# Patient Record
Sex: FEMALE | URBAN - METROPOLITAN AREA
[De-identification: names, ages, dates, MRNs, and addresses within clinical notes are randomized per-mention and may not be internally consistent; named-entity substitution may affect disease eponyms.]

---

## 2022-08-26 ENCOUNTER — PATIENT OUTREACH (OUTPATIENT)
Dept: HEMATOLOGY ONCOLOGY | Facility: CLINIC | Age: 42
End: 2022-08-26

## 2022-08-26 NOTE — PROGRESS NOTES
Outside records requested from: [facility]  Pathology completed on: 08/11/2022  Fax:[phone]  Phone:[phone]  Imaging completed on:N/A  Fax:  Phone:

## 2022-08-30 ENCOUNTER — PATIENT OUTREACH (OUTPATIENT)
Dept: HEMATOLOGY ONCOLOGY | Facility: CLINIC | Age: 42
End: 2022-08-30

## 2022-08-30 NOTE — PROGRESS NOTES
Records received from outside facility  Outside Pathology/Images records received from : Jannie Haynes    Records sent to Pathology dept attention [id number]   Note routed to team  yes

## 2022-09-06 ENCOUNTER — DOCUMENTATION (OUTPATIENT)
Dept: HEMATOLOGY ONCOLOGY | Facility: CLINIC | Age: 42
End: 2022-09-06

## 2022-09-06 NOTE — PROGRESS NOTES
Intake received  Called pt to go over the services we offer got her voicemail left a message for pt to call me back  Pt called me back & I went over the services w/her & she sd that after her appt she will call me & let me know the plan   she thanked me for the call

## 2022-09-12 ENCOUNTER — TELEPHONE (OUTPATIENT)
Dept: GENETICS | Facility: CLINIC | Age: 42
End: 2022-09-12

## 2022-09-12 ENCOUNTER — CONSULT (OUTPATIENT)
Dept: HEMATOLOGY ONCOLOGY | Facility: CLINIC | Age: 42
End: 2022-09-12
Payer: COMMERCIAL

## 2022-09-12 VITALS
OXYGEN SATURATION: 99 % | DIASTOLIC BLOOD PRESSURE: 90 MMHG | TEMPERATURE: 97.6 F | SYSTOLIC BLOOD PRESSURE: 130 MMHG | WEIGHT: 163 LBS | BODY MASS INDEX: 25.58 KG/M2 | HEART RATE: 85 BPM | RESPIRATION RATE: 16 BRPM | HEIGHT: 67 IN

## 2022-09-12 DIAGNOSIS — C43.59 MELANOMA OF BACK (HCC): Primary | ICD-10-CM

## 2022-09-12 DIAGNOSIS — Z80.3 FAMILY HISTORY OF BREAST CANCER: ICD-10-CM

## 2022-09-12 PROCEDURE — 99244 OFF/OP CNSLTJ NEW/EST MOD 40: CPT | Performed by: INTERNAL MEDICINE

## 2022-09-12 RX ORDER — MELATONIN
1000 DAILY
COMMUNITY

## 2022-09-12 RX ORDER — GABAPENTIN 300 MG/1
300 CAPSULE ORAL
COMMUNITY

## 2022-09-12 RX ORDER — CYCLOBENZAPRINE HCL 5 MG
5 TABLET ORAL 3 TIMES DAILY PRN
COMMUNITY

## 2022-09-12 RX ORDER — FERROUS SULFATE 325(65) MG
325 TABLET ORAL
COMMUNITY

## 2022-09-12 RX ORDER — ALBUTEROL SULFATE 90 UG/1
2 AEROSOL, METERED RESPIRATORY (INHALATION) EVERY 6 HOURS PRN
COMMUNITY

## 2022-09-12 RX ORDER — PANTOPRAZOLE SODIUM 40 MG/1
40 TABLET, DELAYED RELEASE ORAL DAILY
COMMUNITY

## 2022-09-12 RX ORDER — BUDESONIDE AND FORMOTEROL FUMARATE DIHYDRATE 80; 4.5 UG/1; UG/1
2 AEROSOL RESPIRATORY (INHALATION) 2 TIMES DAILY
COMMUNITY

## 2022-09-12 RX ORDER — ONDANSETRON 4 MG/1
4 TABLET, FILM COATED ORAL EVERY 8 HOURS PRN
COMMUNITY

## 2022-09-12 NOTE — TELEPHONE ENCOUNTER
I called Sharlene Block to schedule a new patient appointment with the [facility]       Outcome:   Spoke with patient, genetics appt schedule for 9/20 at 2pm  Pt recently dx with melanoma, fhx of breast ca, colon ca, melanoma, bone ca  Non-Buddhist, no prior genetic testing   Genetic test results needed to determine if they remove ovaries

## 2022-09-12 NOTE — PROGRESS NOTES
[facility] [address] [id number] [phone]     Date of Visit: 9/12/2022  Name: Shanti Philip   YOB: 1980        Subjective    VISIT DIAGNOSIS:  Diagnoses and all orders for this visit:    Melanoma of back ([facility] )  -     LD,Blood; Future  -     Ambulatory Referral to Surgical Oncology; Future  -     Ambulatory Referral to Oncology Genetics; Future    Family history of breast cancer    Other orders  -     ferrous sulfate 325 (65 Fe) mg tablet; Take 325 mg by mouth daily with breakfast  -     ondansetron (ZOFRAN) 4 mg tablet; Take 4 mg by mouth every 8 (eight) hours as needed for nausea or vomiting  -     cholecalciferol (VITAMIN D3) 1,000 units tablet; Take 1,000 Units by mouth daily  -     pantoprazole (PROTONIX) 40 mg tablet; Take 40 mg by mouth daily  -     cyclobenzaprine (FLEXERIL) 5 mg tablet; Take 5 mg by mouth 3 (three) times a day as needed for muscle spasms  -     budesonide-formoterol (Symbicort) 80-4 5 MCG/ACT inhaler; Inhale 2 puffs 2 (two) times a day Rinse mouth after use  -     albuterol (PROVENTIL HFA,VENTOLIN HFA) 90 mcg/act inhaler; Inhale 2 puffs every 6 (six) hours as needed for wheezing  -     gabapentin (NEURONTIN) 300 mg capsule; Take 300 mg by mouth 3 (three) times a day        Oncology History   Melanoma of back ([facility] )   8/11/2022 -  Cancer Staged    Staging form: Melanoma of the Skin, AJCC 8th Edition  - Clinical stage from 8/11/2022: Stage IA (cT1a, cN0, cM0) - Signed by Abena Chan MD on 9/12/2022 9/12/2022 Initial Diagnosis    Melanoma of back [facility])        Cancer Staging  Melanoma of back [facility])  Staging form: Melanoma of the Skin, AJCC 8th Edition  - Clinical stage from 8/11/2022: Stage IA (cT1a, cN0, cM0) - Signed by Abena Chan MD on 9/12/2022     HISTORY OF PRESENT ILLNESS: Shanti Philip is a 46 y o  female  who is seen in consultation for new diagnosis of melanoma    She is accompanied by her  in the history is obtained from the patient, her , and review of records  He states that she has had a mole on her back for some time, but that over the past year it has changed  He describes that the mole has always been darker than her other moles, however started changing  She states that it started growing out words and getting bigger  She does describe the lesion is being flat but darker, which this had changed over time  She denies any itching, bleeding, pain of the lesion  She saw Dermatology who biopsy the lesion on 08/11/2022  Pathology demonstrates malignant melanoma, primary cutaneous, 0 3 mm Breslow thickness in the right superior medial upper back; [place] level II, no mitoses, and no ulceration  She is certainly anxious about this new diagnosis  Denies any new, changing, concerning skin lesions at this time  Denies any lymphadenopathy  Anxious for further steps  She describes having normal sun exposure when she was growing up  She does describe having some increased number of sunburns  She describes having 1-2 blistering sunburns  She describes increase tanning bed use  She states she used it a lot during her teenage years  She describes using tanning beds 3 times a week for 2-3 years  She states there is extensive melanoma family history on her mother's side  She states that her paternal aunt had breast cancer diagnosed in her 45s  She denies a family history of ovarian or pancreatic cancer  She states her MGM had colon cancer in her 62s  Her maternal uncle had bone cancer in his late 62s  Her maternal grandfather had lung cancer in his late 76s and he was a nonsmoker  She has 2 children ages 16 and 15  SHe has 2 brothers and 2 sisters  She had red hair when she was younger, has blue/green eyes, and is [place] descent  She is up-to-date on colonoscopies, mammograms which demonstrates cysts, and Pap smears      She denies smoking  She describes having occasional alcohol use  And she uses medical marijuana for pain management  REVIEW OF SYSTEMS:  Review of Systems   Constitutional: Negative for appetite change, fatigue, fever and unexpected weight change  HENT:   Negative for lump/mass  Eyes: Negative for icterus  Respiratory: Negative for cough, shortness of breath and wheezing  Cardiovascular: Negative for leg swelling  Gastrointestinal: Negative for abdominal pain, constipation, diarrhea, nausea and vomiting  Genitourinary: Negative for difficulty urinating and hematuria  Musculoskeletal: Negative for arthralgias, gait problem and myalgias  Skin: Negative for itching and rash  No new, changing, or concerning lesions  Neurological: Negative for extremity weakness, gait problem, headaches, light-headedness and numbness  Hematological: Negative for adenopathy          MEDICATIONS:    Current Outpatient Medications:     albuterol (PROVENTIL HFA,VENTOLIN HFA) 90 mcg/act inhaler, Inhale 2 puffs every 6 (six) hours as needed for wheezing, Disp: , Rfl:     budesonide-formoterol (Symbicort) 80-4 5 MCG/ACT inhaler, Inhale 2 puffs 2 (two) times a day Rinse mouth after use , Disp: , Rfl:     cholecalciferol (VITAMIN D3) 1,000 units tablet, Take 1,000 Units by mouth daily, Disp: , Rfl:     cyclobenzaprine (FLEXERIL) 5 mg tablet, Take 5 mg by mouth 3 (three) times a day as needed for muscle spasms, Disp: , Rfl:     ferrous sulfate 325 (65 Fe) mg tablet, Take 325 mg by mouth daily with breakfast, Disp: , Rfl:     gabapentin (NEURONTIN) 300 mg capsule, Take 300 mg by mouth 3 (three) times a day, Disp: , Rfl:     ondansetron (ZOFRAN) 4 mg tablet, Take 4 mg by mouth every 8 (eight) hours as needed for nausea or vomiting, Disp: , Rfl:     pantoprazole (PROTONIX) 40 mg tablet, Take 40 mg by mouth daily, Disp: , Rfl:      ALLERGIES:  Allergies   Allergen Reactions    Zithromax [Azithromycin] Nausea Only ACTIVE PROBLEMS:  Patient Active Problem List   Diagnosis    Melanoma of back [facility])          PAST MEDICAL HISTORY:   History reviewed  No pertinent past medical history  PAST SURGICAL HISTORY:  History reviewed  No pertinent surgical history  SOCIAL HISTORY:  Social History     Socioeconomic History    Marital status: /Civil Union     Spouse name: None    Number of children: None    Years of education: None    Highest education level: None   Occupational History    None   Tobacco Use    Smoking status: Never Smoker    Smokeless tobacco: Never Used   Substance and Sexual Activity    Alcohol use: Yes     Alcohol/week: 2 0 standard drinks     Types: 2 Glasses of wine per week    Drug use: Yes     Types: Marijuana    Sexual activity: Not Currently   Other Topics Concern    None   Social History Narrative    None     Social Determinants of Health     Financial Resource Strain: Not on file   Food Insecurity: Not on file   Transportation Needs: Not on file   Physical Activity: Not on file   Stress: Not on file   Social Connections: Not on file   Intimate Partner Violence: Not on file   Housing Stability: Not on file        FAMILY HISTORY:  She states there is extensive melanoma family history on her mother's side  She states that her paternal aunt had breast cancer diagnosed in her 45s  She denies a family history of ovarian or pancreatic cancer  She states her MGM had colon cancer in her 62s  Her maternal uncle had bone cancer in his late 62s  Her maternal grandfather had lung cancer in his late 76s and he was a nonsmoke       Objective    PHYSICAL EXAMINATION:   Blood pressure 130/90, pulse 85, temperature 97 6 °F (36 4 °C), temperature source Tympanic, resp  rate 16, height 5' 7" (1 702 m), weight 73 9 kg (163 lb), SpO2 99 %       Pain Score: 0-No pain     ECOG Performance Status    Flowsheet Row Most Recent Value   ECOG Performance Status 0 - Fully active, able to carry on all pre-disease performance without restriction             Physical Exam  Constitutional:       General: She is not in acute distress  Appearance: Normal appearance  She is not toxic-appearing  HENT:      Mouth/Throat:      Mouth: Mucous membranes are moist       Pharynx: Oropharynx is clear  Eyes:      General: No scleral icterus  Cardiovascular:      Rate and Rhythm: Normal rate and regular rhythm  Pulses: Normal pulses  Heart sounds: No murmur heard  No friction rub  No gallop  Pulmonary:      Effort: Pulmonary effort is normal  No respiratory distress  Breath sounds: Normal breath sounds  No wheezing or rales  Chest:   Breasts:      Right: No axillary adenopathy or supraclavicular adenopathy  Left: No axillary adenopathy or supraclavicular adenopathy  Abdominal:      General: There is no distension  Palpations: There is no mass  Tenderness: There is no abdominal tenderness  There is no rebound  Musculoskeletal:         General: No swelling or tenderness  Right lower leg: No edema  Left lower leg: No edema  Lymphadenopathy:      Head:      Right side of head: No submandibular, preauricular or posterior auricular adenopathy  Left side of head: No submandibular, preauricular or posterior auricular adenopathy  Cervical: No cervical adenopathy  Right cervical: No superficial or posterior cervical adenopathy  Left cervical: No superficial or posterior cervical adenopathy  Upper Body:      Right upper body: No supraclavicular or axillary adenopathy  Left upper body: No supraclavicular or axillary adenopathy  Lower Body: No right inguinal adenopathy  No left inguinal adenopathy  Skin:     Findings: No rash  Comments: Healing biopsy site  No evidence of recurrence at primary site  Some DNs, and one small dark lesion on lower mid back  towards left side    Not concerning skin lesions    Neurological:      General: No focal deficit present  Mental Status: She is alert and oriented to person, place, and time  Psychiatric:         Mood and Affect: Mood normal          Behavior: Behavior normal          Thought Content: Thought content normal          Judgment: Judgment normal          I reviewed lab data in the chart  No results found for: WBC, HGB, PLT, MCV   No results found for: NA, K, CL, CO2, BUN, CREATININE, GLUC, EGFRNAA, EGFRAA, CALCIUM, PROT, ALB, TBILI, ALKPHOS, AST, ALT   No results found for: LDH  No results found for: TSH  No results found for: N3RDDBR   No results found for: FREET4      RECENT IMAGING:  No results found  Assessment/Plan  Ms Palmira Barraza is a 46 yr female with newly diagnosed melanoma here for disease management discussion  1  Melanoma of back ([facility] )  I had an extensive discussion with the patient and her  regarding her  diagnosis, prognosis, and recommendations for further management  We reviewed her melanoma history, current findings, pathology and imaging tests  I discussed that currently he has a thin melanoma, 0 3 mm Breslow thickness, and that there is limited potential for recurrence  I did discuss that we recommend a wide local excision and have referred her to our Surgical Oncology to perform this procedure  We discussed that final recommendations will be made after surgery and we have final pathology  We discussed that she and will require ongoing monitoring and surveillance, both for disease recurrence as well as a new primary melanoma as well as other nonmelanoma skin cancers  This includes physical exams and labs, including a comprehensive metabolic panel, CBC with differential and LDH  He does have baseline CBC with differential and comprehensive metabolic panel skin into media  I did discuss we would like an LDH and place the order and provided her with scripts for this blood work      She will return for follow-up 2 weeks after surgery to discuss pathology and final follow-up and surveillance recommendations  We discussed the importance of regular cutaneous self examinations and reviewed the features of lesions that could be concerning for skin cancer  I did explain that she  is at risk for developing another primary melanoma as well  We also discussed avoidance of unnecessary sun exposure and use of sun protective clothing and sunscreen  I also recommended routine follow up and skin checks with dermatology  Family Screening: her  first-degree relatives, namely his siblings, parents, and children, have an increased risk of developing a melanoma over the general population given her  diagnosis of melanoma, and should have annual dermatologic screening         - LD,Blood; Future  - Ambulatory Referral to Surgical Oncology; Future  - Ambulatory Referral to Oncology Genetics; Future    2  Family history of breast cancer  She has a family history of breast cancer in her family - in paternal aunt  Given this history and her melanoma, it is possible that there could be a genetic underpinning to her  disease and recommend that she  speak with a genetic counselor regarding germline genetic testing  I have placed referral for the genetic counselor  He is due for hysterectomy in March 2023 due to abnormal uterine bleeding, with plans for retention of her ovaries     I discussed that I will try to expedite this appointment quickly, as if he needs testing, we would like it done before he undergoes a hysterectomy as we are maybe a question regarding removal of her ovaries at this time  - Ambulatory Referral to Oncology Genetics; Future      Ms Roper Oh had all her questions and concerns addressed and she knows to call with any additional issues  Thank you for allowing me to participate in Ms Monte's care        Mahi Pino MD, PhD

## 2022-09-12 NOTE — LETTER
September 12, 2022     [facility] [id number]    Patient: Anders Tovar   YOB: 1980   Date of Visit: 9/12/2022       Dear Dr Efrain Estes:    Thank you for referring Anders Tovar to me for evaluation  Below are my notes for this consultation  If you have questions, please do not hesitate to call me  I look forward to following your patient along with you  Sincerely,        Mars Finney MD        CC: Sydney Osler, MD  9/12/2022  7:40 PM  Sign when Signing Visit  [address] [place] [address] [facility]  [phone]  [phone]     Date of Visit: 9/12/2022  Name: Anders Tovar   YOB: 1980        Subjective    VISIT DIAGNOSIS:  Diagnoses and all orders for this visit:    [facility] )  -     LD,Blood; Future  -     Ambulatory Referral to Surgical Oncology; Future  -     Ambulatory Referral to Oncology Genetics; Future    Family history of breast cancer    Other orders  -     ferrous sulfate 325 (65 Fe) mg tablet; Take 325 mg by mouth daily with breakfast  -     ondansetron (ZOFRAN) 4 mg tablet; Take 4 mg by mouth every 8 (eight) hours as needed for nausea or vomiting  -     cholecalciferol (VITAMIN D3) 1,000 units tablet; Take 1,000 Units by mouth daily  -     pantoprazole (PROTONIX) 40 mg tablet; Take 40 mg by mouth daily  -     cyclobenzaprine (FLEXERIL) 5 mg tablet; Take 5 mg by mouth 3 (three) times a day as needed for muscle spasms  -     budesonide-formoterol (Symbicort) 80-4 5 MCG/ACT inhaler; Inhale 2 puffs 2 (two) times a day Rinse mouth after use  -     albuterol (PROVENTIL HFA,VENTOLIN HFA) 90 mcg/act inhaler; Inhale 2 puffs every 6 (six) hours as needed for wheezing  -     gabapentin (NEURONTIN) 300 mg capsule;  Take 300 mg by mouth 3 (three) times a day        Oncology History   Melanoma [facility] back ([facility] )   8/11/2022 -  Cancer Staged    Staging form: Melanoma of the Skin, AJCC 8th Edition  - Clinical stage from 8/11/2022: Stage IA (cT1a, cN0, cM0) - Signed by Mars Finney MD on 9/12/2022 9/12/2022 Initial Diagnosis    Melanoma of back [facility])        Cancer Staging  Melanoma of back [facility])  Staging form: Melanoma of the Skin, AJCC 8th Edition  - Clinical stage from 8/11/2022: Stage IA (cT1a, cN0, cM0) - Signed by Mars Finney MD on 9/12/2022     HISTORY OF PRESENT ILLNESS: Anders Tovar is a 46 y o  female  who is seen in consultation for new diagnosis of melanoma  She is accompanied by her  in the history is obtained from the patient, her , and review of records  He states that she has had a mole on her back for some time, but that over the past year it has changed  He describes that the mole has always been darker than her other moles, however started changing  She states that it started growing out words and getting bigger  She does describe the lesion is being flat but darker, which this had changed over time  She denies any itching, bleeding, pain of the lesion  She saw Dermatology who biopsy the lesion on 08/11/2022  Pathology demonstrates malignant melanoma, primary cutaneous, 0 3 mm Breslow thickness in the right superior medial upper back; Cheko level II, no mitoses, and no ulceration  She is certainly anxious about this new diagnosis  Denies any new, changing, concerning skin lesions at this time  Denies any lymphadenopathy  Anxious for further steps  She describes having normal sun exposure when she was growing up  She does describe having some increased number of sunburns  She describes having 1-2 blistering sunburns  She describes increase tanning bed use  She states she used it a lot during her teenage years  She describes using tanning beds 3 times a week for 2-3 years  She states there is extensive melanoma family history on her mother's side    She states that her paternal aunt had breast cancer diagnosed in her 45s  She denies a family history of ovarian or pancreatic cancer  She states her MGM had colon cancer in her 62s  Her maternal uncle had bone cancer in his late 62s  Her maternal grandfather had lung cancer in his late 76s and he was a nonsmoker  She has 2 children ages 16 and 15  SHe has 2 brothers and 2 sisters  She had red hair when she was younger, has blue/green eyes, and is [place] descent  She is up-to-date on colonoscopies, mammograms which demonstrates cysts, and Pap smears  She denies smoking  She describes having occasional alcohol use  And she uses medical marijuana for pain management  REVIEW OF SYSTEMS:  Review of Systems   Constitutional: Negative for appetite change, fatigue, fever and unexpected weight change  HENT:   Negative for lump/mass  Eyes: Negative for icterus  Respiratory: Negative for cough, shortness of breath and wheezing  Cardiovascular: Negative for leg swelling  Gastrointestinal: Negative for abdominal pain, constipation, diarrhea, nausea and vomiting  Genitourinary: Negative for difficulty urinating and hematuria  Musculoskeletal: Negative for arthralgias, gait problem and myalgias  Skin: Negative for itching and rash  No new, changing, or concerning lesions  Neurological: Negative for extremity weakness, gait problem, headaches, light-headedness and numbness  Hematological: Negative for adenopathy          MEDICATIONS:    Current Outpatient Medications:     albuterol (PROVENTIL HFA,VENTOLIN HFA) 90 mcg/act inhaler, Inhale 2 puffs every 6 (six) hours as needed for wheezing, Disp: , Rfl:     budesonide-formoterol (Symbicort) 80-4 5 MCG/ACT inhaler, Inhale 2 puffs 2 (two) times a day Rinse mouth after use , Disp: , Rfl:     cholecalciferol (VITAMIN D3) 1,000 units tablet, Take 1,000 Units by mouth daily, Disp: , Rfl:     cyclobenzaprine (FLEXERIL) 5 mg tablet, Take 5 mg by mouth 3 (three) times a day as needed for muscle spasms, Disp: , Rfl:     ferrous sulfate 325 (65 Fe) mg tablet, Take 325 mg by mouth daily with breakfast, Disp: , Rfl:     gabapentin (NEURONTIN) 300 mg capsule, Take 300 mg by mouth 3 (three) times a day, Disp: , Rfl:     ondansetron (ZOFRAN) 4 mg tablet, Take 4 mg by mouth every 8 (eight) hours as needed for nausea or vomiting, Disp: , Rfl:     pantoprazole (PROTONIX) 40 mg tablet, Take 40 mg by mouth daily, Disp: , Rfl:      ALLERGIES:  Allergies   Allergen Reactions    Zithromax [Azithromycin] Nausea Only        ACTIVE PROBLEMS:  Patient Active Problem List   Diagnosis    Melanoma [facility] back ([facility] )          PAST MEDICAL HISTORY:   History reviewed  No pertinent past medical history  PAST SURGICAL HISTORY:  History reviewed  No pertinent surgical history  SOCIAL HISTORY:  Social History     Socioeconomic History    Marital status: /Civil Union     Spouse name: None    Number of children: None    Years of education: None    Highest education level: None   Occupational History    None   Tobacco Use    Smoking status: Never Smoker    Smokeless tobacco: Never Used   Substance and Sexual Activity    Alcohol use: Yes     Alcohol/week: 2 0 standard drinks     Types: 2 Glasses of wine per week    Drug use: Yes     Types: Marijuana    Sexual activity: Not Currently   Other Topics Concern    None   Social History Narrative    None     Social Determinants of Health     Financial Resource Strain: Not on file   Food Insecurity: Not on file   Transportation Needs: Not on file   Physical Activity: Not on file   Stress: Not on file   Social Connections: Not on file   Intimate Partner Violence: Not on file   Housing Stability: Not on file        FAMILY HISTORY:  She states there is extensive melanoma family history on her mother's side  She states that her paternal aunt had breast cancer diagnosed in her 45s    She denies a family history of ovarian or pancreatic cancer  She states her MGM had colon cancer in her 62s  Her maternal uncle had bone cancer in his late 62s  Her maternal grandfather had lung cancer in his late 76s and he was a nonsmoke       Objective    PHYSICAL EXAMINATION:   Blood pressure 130/90, pulse 85, temperature 97 6 °F (36 4 °C), temperature source Tympanic, resp  rate 16, height 5' 7" (1 702 m), weight 73 9 kg (163 lb), SpO2 99 %  Pain Score: 0-No pain     ECOG Performance Status    Flowsheet Row Most Recent Value   ECOG Performance Status 0 - Fully active, able to carry on all pre-disease performance without restriction             Physical Exam  Constitutional:       General: She is not in acute distress  Appearance: Normal appearance  She is not toxic-appearing  HENT:      Mouth/Throat:      Mouth: Mucous membranes are moist       Pharynx: Oropharynx is clear  Eyes:      General: No scleral icterus  Cardiovascular:      Rate and Rhythm: Normal rate and regular rhythm  Pulses: Normal pulses  Heart sounds: No murmur heard  No friction rub  No gallop  Pulmonary:      Effort: Pulmonary effort is normal  No respiratory distress  Breath sounds: Normal breath sounds  No wheezing or rales  Chest:   Breasts:      Right: No axillary adenopathy or supraclavicular adenopathy  Left: No axillary adenopathy or supraclavicular adenopathy  Abdominal:      General: There is no distension  Palpations: There is no mass  Tenderness: There is no abdominal tenderness  There is no rebound  Musculoskeletal:         General: No swelling or tenderness  Right lower leg: No edema  Left lower leg: No edema  Lymphadenopathy:      Head:      Right side of head: No submandibular, preauricular or posterior auricular adenopathy  Left side of head: No submandibular, preauricular or posterior auricular adenopathy  Cervical: No cervical adenopathy        Right cervical: No superficial or posterior cervical adenopathy  Left cervical: No superficial or posterior cervical adenopathy  Upper Body:      Right upper body: No supraclavicular or axillary adenopathy  Left upper body: No supraclavicular or axillary adenopathy  Lower Body: No right inguinal adenopathy  No left inguinal adenopathy  Skin:     Findings: No rash  Comments: Healing biopsy site  No evidence of recurrence at primary site  Some DNs, and one small dark lesion on lower mid back  towards left side  Not concerning skin lesions    Neurological:      General: No focal deficit present  Mental Status: She is alert and oriented to person, place, and time  Psychiatric:         Mood and Affect: Mood normal          Behavior: Behavior normal          Thought Content: Thought content normal          Judgment: Judgment normal          I reviewed lab data in the chart  No results found for: WBC, HGB, PLT, MCV   No results found for: NA, K, CL, CO2, BUN, CREATININE, GLUC, EGFRNAA, EGFRAA, CALCIUM, PROT, ALB, TBILI, ALKPHOS, AST, ALT   No results found for: LDH  No results found for: TSH  No results found for: U4NOKVI   No results found for: FREET4      RECENT IMAGING:  No results found  Assessment/Plan  Ms Eunice Reddy is a 46 yr female with newly diagnosed melanoma here for disease management discussion  1  Melanoma of back ([facility] )  I had an extensive discussion with the patient and her  regarding her  diagnosis, prognosis, and recommendations for further management  We reviewed her melanoma history, current findings, pathology and imaging tests  I discussed that currently he has a thin melanoma, 0 3 mm Breslow thickness, and that there is limited potential for recurrence  I did discuss that we recommend a wide local excision and have referred her to our Surgical Oncology to perform this procedure  We discussed that final recommendations will be made after surgery and we have final pathology    We discussed that she and will require ongoing monitoring and surveillance, both for disease recurrence as well as a new primary melanoma as well as other nonmelanoma skin cancers  This includes physical exams and labs, including a comprehensive metabolic panel, CBC with differential and LDH  He does have baseline CBC with differential and comprehensive metabolic panel skin into media  I did discuss we would like an LDH and place the order and provided her with scripts for this blood work  She will return for follow-up 2 weeks after surgery to discuss pathology and final follow-up and surveillance recommendations  We discussed the importance of regular cutaneous self examinations and reviewed the features of lesions that could be concerning for skin cancer  I did explain that she  is at risk for developing another primary melanoma as well  We also discussed avoidance of unnecessary sun exposure and use of sun protective clothing and sunscreen  I also recommended routine follow up and skin checks with dermatology  Family Screening: her  first-degree relatives, namely his siblings, parents, and children, have an increased risk of developing a melanoma over the general population given her  diagnosis of melanoma, and should have annual dermatologic screening         - LD,Blood; Future  - Ambulatory Referral to Surgical Oncology; Future  - Ambulatory Referral to Oncology Genetics; Future    2  Family history of breast cancer  She has a family history of breast cancer in her family - in paternal aunt  Given this history and her melanoma, it is possible that there could be a genetic underpinning to her  disease and recommend that she  speak with a genetic counselor regarding germline genetic testing  I have placed referral for the genetic counselor  He is due for hysterectomy in March 2023 due to abnormal uterine bleeding, with plans for retention of her ovaries     I discussed that I will try to expedite this appointment quickly, as if he needs testing, we would like it done before he undergoes a hysterectomy as we are maybe a question regarding removal of her ovaries at this time  - Ambulatory Referral to Oncology Genetics; Future      Ms Kristian Balderas had all her questions and concerns addressed and she knows to call with any additional issues  Thank you for allowing me to participate in Ms Monte's care        Blessing Banegas MD, PhD

## 2022-09-13 ENCOUNTER — PATIENT OUTREACH (OUTPATIENT)
Dept: HEMATOLOGY ONCOLOGY | Facility: CLINIC | Age: 42
End: 2022-09-13

## 2022-09-13 NOTE — PROGRESS NOTES
Intake received/ Chart reviewed for services completed outside of [facility][note 2] 9/13/22    Pathology completed: 8/11/22 previously obtained from outside and available in [facility] [address] [place] [address] completed:none at this time    All records needed are in patients chart  No records retrieval needed at this time

## 2022-09-15 ENCOUNTER — CONSULT (OUTPATIENT)
Dept: SURGICAL ONCOLOGY | Facility: CLINIC | Age: 42
End: 2022-09-15
Payer: COMMERCIAL

## 2022-09-15 VITALS
WEIGHT: 161 LBS | TEMPERATURE: 99.1 F | OXYGEN SATURATION: 99 % | HEIGHT: 67 IN | DIASTOLIC BLOOD PRESSURE: 78 MMHG | RESPIRATION RATE: 17 BRPM | SYSTOLIC BLOOD PRESSURE: 123 MMHG | HEART RATE: 96 BPM | BODY MASS INDEX: 25.27 KG/M2

## 2022-09-15 DIAGNOSIS — C43.59 MELANOMA OF BACK (HCC): Primary | ICD-10-CM

## 2022-09-15 DIAGNOSIS — L98.9 SKIN LESION OF BACK: ICD-10-CM

## 2022-09-15 PROCEDURE — 11104 PUNCH BX SKIN SINGLE LESION: CPT | Performed by: STUDENT IN AN ORGANIZED HEALTH CARE EDUCATION/TRAINING PROGRAM

## 2022-09-15 PROCEDURE — 88341 IMHCHEM/IMCYTCHM EA ADD ANTB: CPT | Performed by: PATHOLOGY

## 2022-09-15 PROCEDURE — 88305 TISSUE EXAM BY PATHOLOGIST: CPT | Performed by: PATHOLOGY

## 2022-09-15 PROCEDURE — 99244 OFF/OP CNSLTJ NEW/EST MOD 40: CPT | Performed by: STUDENT IN AN ORGANIZED HEALTH CARE EDUCATION/TRAINING PROGRAM

## 2022-09-15 PROCEDURE — 88342 IMHCHEM/IMCYTCHM 1ST ANTB: CPT | Performed by: PATHOLOGY

## 2022-09-15 RX ORDER — TRAMADOL HYDROCHLORIDE 50 MG/1
50 TABLET ORAL EVERY 6 HOURS PRN
Qty: 10 TABLET | Refills: 0 | Status: SHIPPED | OUTPATIENT
Start: 2022-09-15

## 2022-09-15 RX ORDER — CEFAZOLIN SODIUM 1 G/50ML
1000 SOLUTION INTRAVENOUS ONCE
Status: CANCELLED | OUTPATIENT
Start: 2022-09-15 | End: 2022-09-15

## 2022-09-15 NOTE — H&P (VIEW-ONLY)
Surgical Oncology Consultation    [address] [facility] Guille Anderson  [address] [facility] [address] [facility]  [phone]    Patient:  Jose Angel Every  1980  [id number]    Primary Care provider:  No primary care provider on file  No primary provider on file  Referring provider:  Kaia Blair MD  [facility],  [facility]    Diagnoses and all orders for this visit:    Melanoma of back [facility])  -     Ambulatory Referral to Surgical Oncology        Chief Complaint   Patient presents with    New Patient Visit       No follow-ups on file  Oncology History   Melanoma of back [facility])   8/11/2022 -  Cancer Staged    Staging form: Melanoma of the Skin, AJCC 8th Edition  - Clinical stage from 8/11/2022: Stage IA (cT1a, cN0, cM0) - Signed by Kaia Blair MD on 9/12/2022 8/11/2022 Biopsy    Right upper back, shave biopsy  0 4mm  No ulceration   No mitoses      9/12/2022 Initial Diagnosis    Melanoma of back ([address] )         History of Present Illness  : This is a 47-year-old female with a new diagnosis of a T1a melanoma  She had a lesion on the upper back present which she felt changed over time  This was biopsied and found to be a T1a melanoma with melena 9 know my in-situ extending to the deep margin and reviewed via its maximal extension  She denies any headache bone pain other lumps or bumps of concern  She currently has a sunburn but states that she has been diligent about sun care  She denies any other melanoma or nonmelanoma skin cancer  No concerns of the the axilla  She also has a lesion on the lower back that Dr Daily Sotelo pointed out  She feels it is new and was not there before  Review of Systems  Complete ROS Surg Onc:   Constitutional: The patient denies new or recent history of general fatigue, no recent weight loss, no change in appetite  Eyes: No complaints of visual problems, no scleral icterus     ENT: No complaints of ear pain, no hoarseness, no difficulty swallowing,  no tinnitus and no new masses in head, oral cavity, or neck  Cardiovascular: No complaints of chest pain, no palpitations, no ankle edema  Respiratory: No complaints of shortness of breath, no cough  Gastrointestinal: No complaints of jaundice, no bloody stools, no pale stools  Genitourinary: No complaints of dysuria, no hematuria, no nocturia, no frequent urination, no urethral discharge  Musculoskeletal: No complaints of weakness, paralysis, joint stiffness or arthralgias  Integumentary: No complaints of rash, no new lesions  Neurological: No complaints of convulsions, no seizures, no dizziness  Hematologic/Lymphatic: No complaints of easy bruising  Endocrine:  No hot or cold intolerance  No polydipsia, polyphagia, or polyuria  Allergy/immunology:  No environmental allergies  No food allergies  Not immunocompromised  Patient Active Problem List   Diagnosis    [facility]     No past medical history on file  No past surgical history on file  No family history on file  Social History     Socioeconomic History    Marital status: /Civil Union     Spouse name: Not on file    Number of children: Not on file    Years of education: Not on file    Highest education level: Not on file   Occupational History    Not on file   Tobacco Use    Smoking status: Never Smoker    Smokeless tobacco: Never Used   Substance and Sexual Activity    Alcohol use:  Yes     Alcohol/week: 2 0 standard drinks     Types: 2 Glasses of wine per week    Drug use: Yes     Types: Marijuana    Sexual activity: Not Currently   Other Topics Concern    Not on file   Social History Narrative    Not on file     Social Determinants of Health     Financial Resource Strain: Not on file   Food Insecurity: Not on file   Transportation Needs: Not on file   Physical Activity: Not on file   Stress: Not on file   Social Connections: Not on file   Intimate Partner Violence: Not on file   Housing Stability: Not on file       Current Outpatient Medications:     albuterol (PROVENTIL HFA,VENTOLIN HFA) 90 mcg/act inhaler, Inhale 2 puffs every 6 (six) hours as needed for wheezing, Disp: , Rfl:     budesonide-formoterol (SYMBICORT) 80-4 5 MCG/ACT inhaler, Inhale 2 puffs 2 (two) times a day Rinse mouth after use , Disp: , Rfl:     cholecalciferol (VITAMIN D3) 1,000 units tablet, Take 1,000 Units by mouth daily, Disp: , Rfl:     cyclobenzaprine (FLEXERIL) 5 mg tablet, Take 5 mg by mouth 3 (three) times a day as needed for muscle spasms, Disp: , Rfl:     ferrous sulfate 325 (65 Fe) mg tablet, Take 325 mg by mouth daily with breakfast, Disp: , Rfl:     gabapentin (NEURONTIN) 300 mg capsule, Take 300 mg by mouth 3 (three) times a day, Disp: , Rfl:     ondansetron (ZOFRAN) 4 mg tablet, Take 4 mg by mouth every 8 (eight) hours as needed for nausea or vomiting, Disp: , Rfl:     pantoprazole (PROTONIX) 40 mg tablet, Take 40 mg by mouth daily, Disp: , Rfl:   Allergies   Allergen Reactions    Zithromax [Azithromycin] Nausea Only       Vitals:    09/15/22 1259   BP: 123/78   Pulse: 96   Resp: 17   Temp: 99 1 °F (37 3 °C)   SpO2: 99%       Physical Exam   General: Appears well, appears stated age  Skin: Warm, anicteric  Upper back melanoma scar appreciated; small nevus with irregular borders at mid lower back over incision site  Spinal fusion incision  HEENT: Normocephalic, atraumatic; sclera aniceteric, mucous membranes moist; cervical nodes without adenopathy  Cardiopulmonary: RRR, Easy WOB, no BLE edema  Abd: Flat and soft, nontender, no masses appreciated, no hepatosplenomegaly  MSK: Symmetric, no cyanosis, no overt weakness  Lymphatic: No cervical, axillary or inguinal lymphadenopathy  Neuro: Affect appropriate, no gross motor abnormalities      Pathology:  Final Diagnosis   A   Skin, Right Superior Medial Upper Back, Shave Biopsy (3 slides [id number] [employer], collected 8/11/2022):     Consistent with MELANOMA (thickness: 0 4 mm); extending to the tissue edges (see note)      Note: The provided immuostains was reviewed  Please see synoptic report for additional details      Synoptic report for melanoma of the skin  Thickness: 0 4 mm  Ulceration: not seen  Anatomic Katie Crass) level: II/III  Type: superficial spreading melanoma  Mitoses: 0/mm2  Microsatellites: not seen  Lymphovascular invasion: not seen  Neurotropism: not seen  Tumor regression: not seen  Margin assessment: melanoma in situ extends to peripheral specimen margin and deep margin (via follicular extension)  Pathologic stage: pT1a  Associated nevus: compound dysplastic nevus       Labs: Reviewed in EPIC    Imaging  No results found  I independently reviewed and interpreted the above laboratory and imaging data  Discussion/Summary:   47 yo female with T1a melanoma and upper back  Discussed diagnosis of melanoma and explained that the patient has a thin melanoma for which a wide local excision is recommended  Discussed that any additional therapy will be guided by the final pathology  Discussed that sun protection is best prevention for melanoma and discussed ongoing sun protection  Reinforced need for continued skin surveillance with dermatology, which the patient plans to establish  Risks, benefits, alternatives and prognosis discussed in full to include bleeding, infection, wound healing complications, need for re-excision, seroma, and pt expresses understanding and endorsement  Will proceed with WLE  Also biopsied a lesion on inferior back  See below  Clinical suspicion low      Biopsy    Date/Time: 9/15/2022 1:28 PM  Performed by: Selena Lebron MD  Authorized by: Selena Lebron MD     Procedure Details - Skin Biopsy:     Biopsy tissue type: skin and subcutaneous    Biopsy method: punch biopsy      Body area:  Trunk    Trunk location:  Back    Initial size (mm):  3    Final defect size (mm):  3    Malignancy: malignancy unknown       Closed with single 3-0 nylon

## 2022-09-15 NOTE — PROGRESS NOTES
Surgical Oncology Consultation    [address] [facility] CANCER CARE SURGICAL ONCOLOGY [place]  [address] [facility]  [place] [address] [facility]  [phone]    Patient:  Pearl Espino  1980  [id number]    Primary Care provider:  No primary care provider on file  No primary provider on file  Referring provider:  MD Jermaine Cervantes 34,  [facility]    Diagnoses and all orders for this visit:    Melanoma of back [facility])  -     Ambulatory Referral to Surgical Oncology        Chief Complaint   Patient presents with    New Patient Visit       No follow-ups on file  Oncology History   Melanoma of back [facility])   8/11/2022 -  Cancer Staged    Staging form: Melanoma of the Skin, AJCC 8th Edition  - Clinical stage from 8/11/2022: Stage IA (cT1a, cN0, cM0) - Signed by Jennifer Kumar MD on 9/12/2022 8/11/2022 Biopsy    Right upper back, shave biopsy  0 4mm  No ulceration   No mitoses      9/12/2022 Initial Diagnosis    Melanoma of back ([address] )         History of Present Illness  : This is a 29-year-old female with a new diagnosis of a T1a melanoma  She had a lesion on the upper back present which she felt changed over time  This was biopsied and found to be a T1a melanoma with melena 9 know my in-situ extending to the deep margin and reviewed via its maximal extension  She denies any headache bone pain other lumps or bumps of concern  She currently has a sunburn but states that she has been diligent about sun care  She denies any other melanoma or nonmelanoma skin cancer  No concerns of the the axilla  She also has a lesion on the lower back that Dr Iman Whitney pointed out  She feels it is new and was not there before  Review of Systems  Complete ROS Surg Onc:   Constitutional: The patient denies new or recent history of general fatigue, no recent weight loss, no change in appetite  Eyes: No complaints of visual problems, no scleral icterus     ENT: No complaints of ear pain, no hoarseness, no difficulty swallowing,  no tinnitus and no new masses in head, oral cavity, or neck  Cardiovascular: No complaints of chest pain, no palpitations, no ankle edema  Respiratory: No complaints of shortness of breath, no cough  Gastrointestinal: No complaints of jaundice, no bloody stools, no pale stools  Genitourinary: No complaints of dysuria, no hematuria, no nocturia, no frequent urination, no urethral discharge  Musculoskeletal: No complaints of weakness, paralysis, joint stiffness or arthralgias  Integumentary: No complaints of rash, no new lesions  Neurological: No complaints of convulsions, no seizures, no dizziness  Hematologic/Lymphatic: No complaints of easy bruising  Endocrine:  No hot or cold intolerance  No polydipsia, polyphagia, or polyuria  Allergy/immunology:  No environmental allergies  No food allergies  Not immunocompromised  Patient Active Problem List   Diagnosis    [facility] )     No past medical history on file  No past surgical history on file  No family history on file  Social History     Socioeconomic History    Marital status: /Civil Union     Spouse name: Not on file    Number of children: Not on file    Years of education: Not on file    Highest education level: Not on file   Occupational History    Not on file   Tobacco Use    Smoking status: Never Smoker    Smokeless tobacco: Never Used   Substance and Sexual Activity    Alcohol use:  Yes     Alcohol/week: 2 0 standard drinks     Types: 2 Glasses of wine per week    Drug use: Yes     Types: Marijuana    Sexual activity: Not Currently   Other Topics Concern    Not on file   Social History Narrative    Not on file     Social Determinants of Health     Financial Resource Strain: Not on file   Food Insecurity: Not on file   Transportation Needs: Not on file   Physical Activity: Not on file   Stress: Not on file   Social Connections: Not on file   Intimate Partner Violence: Not on file   Housing Stability: Not on file       Current Outpatient Medications:     albuterol (PROVENTIL HFA,VENTOLIN HFA) 90 mcg/act inhaler, Inhale 2 puffs every 6 (six) hours as needed for wheezing, Disp: , Rfl:     budesonide-formoterol (SYMBICORT) 80-4 5 MCG/ACT inhaler, Inhale 2 puffs 2 (two) times a day Rinse mouth after use , Disp: , Rfl:     cholecalciferol (VITAMIN D3) 1,000 units tablet, Take 1,000 Units by mouth daily, Disp: , Rfl:     cyclobenzaprine (FLEXERIL) 5 mg tablet, Take 5 mg by mouth 3 (three) times a day as needed for muscle spasms, Disp: , Rfl:     ferrous sulfate 325 (65 Fe) mg tablet, Take 325 mg by mouth daily with breakfast, Disp: , Rfl:     gabapentin (NEURONTIN) 300 mg capsule, Take 300 mg by mouth 3 (three) times a day, Disp: , Rfl:     ondansetron (ZOFRAN) 4 mg tablet, Take 4 mg by mouth every 8 (eight) hours as needed for nausea or vomiting, Disp: , Rfl:     pantoprazole (PROTONIX) 40 mg tablet, Take 40 mg by mouth daily, Disp: , Rfl:   Allergies   Allergen Reactions    Zithromax [Azithromycin] Nausea Only       Vitals:    09/15/22 1259   BP: 123/78   Pulse: 96   Resp: 17   Temp: 99 1 °F (37 3 °C)   SpO2: 99%       Physical Exam   General: Appears well, appears stated age  Skin: Warm, anicteric  Upper back melanoma scar appreciated; small nevus with irregular borders at mid lower back over incision site  Spinal fusion incision  HEENT: Normocephalic, atraumatic; sclera aniceteric, mucous membranes moist; cervical nodes without adenopathy  Cardiopulmonary: RRR, Easy WOB, no BLE edema  Abd: Flat and soft, nontender, no masses appreciated, no hepatosplenomegaly  MSK: Symmetric, no cyanosis, no overt weakness  Lymphatic: No cervical, axillary or inguinal lymphadenopathy  Neuro: Affect appropriate, no gross motor abnormalities      Pathology:  Final Diagnosis   A   Skin, Right Superior Medial Upper Back, Shave Biopsy (3 slides [id number] Navent, collected 8/11/2022):     Consistent with MELANOMA (thickness: 0 4 mm); extending to the tissue edges (see note)      Note: The provided immuostains was reviewed  Please see synoptic report for additional details      Synoptic report for melanoma of the skin  Thickness: 0 4 mm  Ulceration: not seen  Anatomic Stacy Hansen) level: II/III  Type: superficial spreading melanoma  Mitoses: 0/mm2  Microsatellites: not seen  Lymphovascular invasion: not seen  Neurotropism: not seen  Tumor regression: not seen  Margin assessment: melanoma in situ extends to peripheral specimen margin and deep margin (via follicular extension)  Pathologic stage: pT1a  Associated nevus: compound dysplastic nevus       Labs: Reviewed in EPIC    Imaging  No results found  I independently reviewed and interpreted the above laboratory and imaging data  Discussion/Summary:   47 yo female with T1a melanoma and upper back  Discussed diagnosis of melanoma and explained that the patient has a thin melanoma for which a wide local excision is recommended  Discussed that any additional therapy will be guided by the final pathology  Discussed that sun protection is best prevention for melanoma and discussed ongoing sun protection  Reinforced need for continued skin surveillance with dermatology, which the patient plans to establish  Risks, benefits, alternatives and prognosis discussed in full to include bleeding, infection, wound healing complications, need for re-excision, seroma, and pt expresses understanding and endorsement  Will proceed with WLE  Also biopsied a lesion on inferior back  See below  Clinical suspicion low      Biopsy    Date/Time: 9/15/2022 1:28 PM  Performed by: Hiram Wilson MD  Authorized by: Hiram Wilson MD     Procedure Details - Skin Biopsy:     Biopsy tissue type: skin and subcutaneous    Biopsy method: punch biopsy      Body area:  Trunk    Trunk location:  Back    Initial size (mm):  3    Final defect size (mm):  3    Malignancy: malignancy unknown       Closed with single 3-0 nylon

## 2022-09-16 DIAGNOSIS — C43.59 MELANOMA OF BACK (HCC): Primary | ICD-10-CM

## 2022-09-20 ENCOUNTER — CLINICAL SUPPORT (OUTPATIENT)
Dept: GENETICS | Facility: CLINIC | Age: 42
End: 2022-09-20
Payer: COMMERCIAL

## 2022-09-20 DIAGNOSIS — Z80.8 FAMILY HISTORY OF MALIGNANT MELANOMA: ICD-10-CM

## 2022-09-20 DIAGNOSIS — C43.59 MELANOMA OF BACK (HCC): Primary | ICD-10-CM

## 2022-09-20 DIAGNOSIS — Z80.3 FAMILY HISTORY OF BREAST CANCER: ICD-10-CM

## 2022-09-20 PROCEDURE — 36415 COLL VENOUS BLD VENIPUNCTURE: CPT

## 2022-09-20 PROCEDURE — NC001 PR NO CHARGE: Performed by: GENETIC COUNSELOR, MS

## 2022-09-20 NOTE — PROGRESS NOTES
Pre-Test Genetic Counseling Consult Note    Patient Name: Anders Tovar   /Age: 1980/41 y o  Referring Provider: Nellie Soriano MD    Date of Service: 2022  Genetic Counselor: Joselin Young MS, [facility]  Interpretation Services: None  [facility]      Cameron Pittman was referred to the [address] [place] [address] and Genetic Assessment Program due to her personal history of melanoma and family history of breast cancer and melanoma  she presents today to discuss the possibility of a hereditary cancer syndrome, options for genetic testing, and implications for her and her family  Cancer History and Treatment:   Oncology History   Melanoma [facility] back ([facility] )   2022 -  Cancer Staged     Staging form: Melanoma of the Skin, AJCC 8th Edition  - Clinical stage from 2022: Stage IA (cT1a, cN0, cM0) - Signed by Mars Finney MD on 2022 Initial Diagnosis     Melanoma of back ([facility] )         Cancer Staging  Melanoma of back [facility])  Staging form: Melanoma of the Skin, AJCC 8th Edition  - Clinical stage from 2022: Stage IA (cT1a, cN0, cM0) - Signed by Mars Finney MD on 2022    Screening Hx:   Breast:  Has had a few mammos in the past due to cysts    Colon:  Colonoscopy: 2 in the past   No polyps    Gynecologic:  Ovaries and Uterus intact , scheduled for hysterectomy in March, testing will determine if ovaries are kept    Other screening: none    Reproductive History  Age at menarche: 8  Age at first live birth: 26  Menopause: Premenopausal  Hormone replacement: none    Medical and Surgical History  Pertinent surgical history: No past surgical history on file  Pertinent medical history:No past medical history on file        Other History:  Height:   Ht Readings from Last 1 Encounters:   09/15/22 5' 7" (1 702 m)     Weight:   Wt Readings from Last 1 Encounters:   09/15/22 73 kg (161 lb)       Relevant Family History   Patient reports no Ashkenazi Denominational ancestry  Maternal Family History:  2 uncles with history of melanoma  Aunt (67) history of melanoma (dx 48)  Uncle (d 68) history of bone cancer (dx 76)   Daughter (50) recently diagnosed with melanoma  Grandfather (d 78) history of lung cancer (dx 66)  Great grandmother through grandmother wit history of colon cancer    Paternal Family History:   Aunt (d 55) history of breast cancer (dx 39) metastatic to brain   No other reported history of cancer    Please refer to the scanned pedigree in the Media Tab for a complete family history     *All history is reported as provided by the patient; records are not available for review, except where indicated  Assessment:  We discussed sporadic, familial and hereditary cancer  We also discussed the many factors that influence our risk for cancer such as age, environmental exposures, lifestyle choices and family history  We reviewed the indications suggestive of a hereditary predisposition to cancer  Genetic testing is indicated for [facility] based on the following criteria: Meets NCCN V1 2023 Testing Criteria for High-Penetrance Breast Cancer Susceptibility Genes: family history of a second-degree relative dianogsed with breast cancer under 48    The risks, benefits, and limitations of genetic testing were reviewed with the patient, as well as genetic discrimination laws, and possible test results (positive, negative, variants of uncertain significance) and their clinical implications  If positive for a mutation, options for managing cancer risk including increased surveillance, chemoprevention, and in some cases prophylactic surgery were discussed  [facility] was informed that if a hereditary cancer syndrome was identified in her, first degree relatives (parents, siblings, and children) have a chance of also inheriting the condition   Genetic testing would allow for predictive genetic testing in other relatives, who may also be at risk depending on their degree of relation  Plan: Patient decided to proceed with testing and provided consent  Summary:     Sample Collection:  The patient's blood sample was drawn in the office on 9/20 by medical assistant Bahman Singh    Genetic Testing Preformed: [facility] CancerNext + MelanomaNext + RNA (40 total genes): APC, ZIYAD, AXIN2, BAP1, BARD1, BMPR1A, BRCA1, BRCA2, BRIP1, CDH1, CDK4, CDKN2A, CHEK2, DICER1, EPCAM, GREM1, HOXB13, MITF, MLH1, MSH2, MSH3, MSH6, MUTYH, NBN, NF1, NTHL1, PALB2, PMS2, POLD1, POLE, POT1, PTEN, RAD51C, RAD51D, RB1, RECQL, SMAD4, SMARCA4, STK11, TP53     Results take approximately 2-3 weeks to complete once test is started  We will contact Christy Porter once results are available  Additional recommendations for surveillance/medical management will be made pending genetic test results

## 2022-09-20 NOTE — LETTER
2022     Lianna Banks MD  [address] [id number]    Patient: Roula Her  YOB: 1980  Date of Visit: 2022      Dear Dr Macarena Worthington: Thank you for referring Roula Her to me for evaluation  Below are my notes for this consultation  If you have questions, please do not hesitate to call me  I look forward to following your patient along with you  Sincerely,        Cely Leija        CC: No Recipients        Pre-Test Genetic Counseling Consult Note    Patient Name: Roula Her   /Age: 1980/41 y o  Referring Provider: Celia Erazo MD    Date of Service: 2022  Genetic Counselor: Cely Leija MS, [facility]  Interpretation Services: None  [facility]      Dian Sanders was referred to the [address] [place] [address] and Genetic Assessment Program due to her personal history of melanoma and family history of breast cancer and melanoma  she presents today to discuss the possibility of a hereditary cancer syndrome, options for genetic testing, and implications for her and her family       Cancer History and Treatment:   Oncology History   Melanoma of back ([facility] [address] )   2022 -  Cancer Staged     Staging form: Melanoma of the Skin, AJCC 8th Edition  - Clinical stage from 2022: Stage IA (cT1a, cN0, cM0) - Signed by Lianna Banks MD on 2022 Initial Diagnosis     Melanoma of back ([facility] [address] )         Cancer Staging  Melanoma of back [facility])  Staging form: Melanoma of the Skin, AJCC 8th Edition  - Clinical stage from 2022: Stage IA (cT1a, cN0, cM0) - Signed by Lianna Banks MD on 2022    Screening Hx:   Breast:  Has had a few mammos in the past due to cysts    Colon:  Colonoscopy: 2 in the past   No polyps    Gynecologic:  Ovaries and Uterus intact , scheduled for hysterectomy in March, testing will determine if ovaries are kept    Other screening: none    Reproductive History  Age at menarche: 8  Age at first live birth: 26  Menopause: Premenopausal  Hormone replacement: none    Medical and Surgical History  Pertinent surgical history: No past surgical history on file  Pertinent medical history:No past medical history on file  Other History:  Height:   Ht Readings from Last 1 Encounters:   09/15/22 5' 7" (1 702 m)     Weight:   Wt Readings from Last 1 Encounters:   09/15/22 73 kg (161 lb)       Relevant Family History   Patient reports no Ashkenazi Faith ancestry  Maternal Family History:  2 uncles with history of melanoma  Aunt (67) history of melanoma (dx 48)  Uncle (d 68) history of bone cancer (dx 76)   Daughter (50) recently diagnosed with melanoma  Grandfather (d 78) history of lung cancer (dx 66)  Great grandmother through grandmother wit history of colon cancer    Paternal Family History:   Aunt (d 55) history of breast cancer (dx 39) metastatic to brain   No other reported history of cancer    Please refer to the scanned pedigree in the Media Tab for a complete family history     *All history is reported as provided by the patient; records are not available for review, except where indicated  Assessment:  We discussed sporadic, familial and hereditary cancer  We also discussed the many factors that influence our risk for cancer such as age, environmental exposures, lifestyle choices and family history  We reviewed the indications suggestive of a hereditary predisposition to cancer      Genetic testing is indicated for [facility] based on the following criteria: Meets NCCN V1 2023 Testing Criteria for High-Penetrance Breast Cancer Susceptibility Genes: family history of a second-degree relative dianogsed with breast cancer under 48    The risks, benefits, and limitations of genetic testing were reviewed with the patient, as well as genetic discrimination laws, and possible test results (positive, negative, variants of uncertain significance) and their clinical implications  If positive for a mutation, options for managing cancer risk including increased surveillance, chemoprevention, and in some cases prophylactic surgery were discussed  [facility] was informed that if a hereditary cancer syndrome was identified in her, first degree relatives (parents, siblings, and children) have a chance of also inheriting the condition  Genetic testing would allow for predictive genetic testing in other relatives, who may also be at risk depending on their degree of relation  Plan: Patient decided to proceed with testing and provided consent  Summary:     Sample Collection:  The patient's blood sample was drawn in the office on 9/20 by medical assistant Aubree Dillon    Genetic Testing Preformed: [facility] CancerNext + MelanomaNext + RNA (40 total genes): APC, ZIYAD, AXIN2, BAP1, BARD1, BMPR1A, BRCA1, BRCA2, BRIP1, CDH1, CDK4, CDKN2A, CHEK2, DICER1, EPCAM, GREM1, HOXB13, MITF, MLH1, MSH2, MSH3, MSH6, MUTYH, NBN, NF1, NTHL1, PALB2, PMS2, POLD1, POLE, POT1, PTEN, RAD51C, RAD51D, RB1, RECQL, SMAD4, SMARCA4, STK11, TP53     Results take approximately 2-3 weeks to complete once test is started  We will contact Reford Filter once results are available  Additional recommendations for surveillance/medical management will be made pending genetic test results

## 2022-09-23 PROCEDURE — 88342 IMHCHEM/IMCYTCHM 1ST ANTB: CPT | Performed by: PATHOLOGY

## 2022-09-23 PROCEDURE — 88305 TISSUE EXAM BY PATHOLOGIST: CPT | Performed by: PATHOLOGY

## 2022-09-23 PROCEDURE — 88341 IMHCHEM/IMCYTCHM EA ADD ANTB: CPT | Performed by: PATHOLOGY

## 2022-09-27 ENCOUNTER — TELEPHONE (OUTPATIENT)
Dept: SURGICAL ONCOLOGY | Facility: CLINIC | Age: 42
End: 2022-09-27

## 2022-09-27 NOTE — TELEPHONE ENCOUNTER
Tc to pt regarding stitch removal  Pt states she is still planning to come into the office tomorrow afternoon  Notified pt that I would be at the [facility] office after lunch  Pt verbalized understanding and agreeable to come to [facility] [place] tomorrow 9/28

## 2022-09-28 ENCOUNTER — DOCUMENTATION (OUTPATIENT)
Dept: SURGICAL ONCOLOGY | Facility: CLINIC | Age: 42
End: 2022-09-28

## 2022-09-28 NOTE — PROGRESS NOTES
Pt presents for suture removal  One suture removed off of back from biopsy site  Left open to air  Instructed pt to call with any questions or concerns

## 2022-09-30 ENCOUNTER — TELEPHONE (OUTPATIENT)
Dept: HEMATOLOGY ONCOLOGY | Facility: CLINIC | Age: 42
End: 2022-09-30

## 2022-09-30 NOTE — TELEPHONE ENCOUNTER
Confirmed w/ patient appt  On 10/24/22 w/ Dr Iman Whitney which is a 2 week f/u after surgery on 10/7/22

## 2022-10-03 NOTE — PRE-PROCEDURE INSTRUCTIONS
Pre-Surgery Instructions:   Medication Instructions    albuterol (PROVENTIL HFA,VENTOLIN HFA) 90 mcg/act inhaler Uses PRN- OK to take day of surgery    budesonide-formoterol (SYMBICORT) 80-4 5 MCG/ACT inhaler Take day of surgery   cholecalciferol (VITAMIN D3) 1,000 units tablet hold starting today    cyclobenzaprine (FLEXERIL) 5 mg tablet Uses PRN- OK to take day of surgery    ferrous sulfate 325 (65 Fe) mg tablet hold starting today    gabapentin (NEURONTIN) 300 mg capsule Take night before surgery    ondansetron (ZOFRAN) 4 mg tablet Uses PRN- OK to take day of surgery    pantoprazole (PROTONIX) 40 mg tablet Take day of surgery  Reviewed with patient, in detail, instructions from "My Surgical Experience"  Instructed to avoid all  OTC vitamins/supplements and NSAIDS starting today  Tylenol ok to take PRN  Advised patient that [facility] will call with surgery arrival time and hospital directions the business day prior to surgery  Advised patient nothing eat or drink after midnight prior to surgery  Instructed to call surgeon's office in meantime with any new illnesses/exposure  Patient verbalized understanding of current visitor restrictions/masking guidelines and advised that he/she can confirm these at time of arrival call with [facility]  Patient verbalized understanding and knows to call surgeon's office with any additional questions prior to surgery

## 2022-10-05 ENCOUNTER — TELEPHONE (OUTPATIENT)
Dept: GENETICS | Facility: CLINIC | Age: 42
End: 2022-10-05

## 2022-10-05 NOTE — TELEPHONE ENCOUNTER
----- Message from Moriah Isaacs sent at 10/5/2022 12:01 PM EDT -----  Regarding: complete  GC Completed Chart     Result Type: VUS    Result Delivery: SuperSecrett Message    Monthly Review: Does not need monthly review- COMPLETE

## 2022-10-05 NOTE — TELEPHONE ENCOUNTER
Post-Test Genetic Counseling Consult Note    Today I left a voicemail for Geraldine reviewing the results of her genetic test for hereditary cancer  We met previously on 9/20 for pre-test counseling  I encouraged her to call [phone] to discuss this result further  A copy of this consult note and genetic test result will be shared with the patient  SUMMARY:    Test(s): [facility] Cancer[facility] + MelanomaNext + RNA (40 total genes): APC, ZIYAD, AXIN2, BAP1, BARD1, BMPR1A, BRCA1, BRCA2, BRIP1, CDH1, CDK4, CDKN2A, CHEK2, DICER1, EPCAM, GREM1, HOXB13, MITF, MLH1, MSH2, MSH3, MSH6, MUTYH, NBN, NF1, NTHL1, PALB2, PMS2, POLD1, POLE, POT1, PTEN, RAD51C, RAD51D, RB1, RECQL, SMAD4, SMARCA4, STK11, TP53     Result: Variant of uncertain significance     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); heterozygous; uncertain significance     Assessment:  A variant of uncertain significance (VUS) means that a change was identified in a specific gene but it cannot be determined whether the variant is associated with an increased risk of cancer or is a harmless genetic change  The significance of the APC variant is currently not known and therefore this test result cannot be used to help determine Terra cancer risks  It is possible that the variant was seen in only a handful of individuals, or there may be conflicting or incomplete information in the medical literature about the variant and its association with hereditary cancer  Risks and Testing for Family Members:    Genetic testing for this variant is not recommended for relatives who wish to determine their cancer risks for purposes of determining medical management  The presence or absence of this variant in a relative is not clinically meaningful unless the variant is reclassified in the future       The laboratory will continue to accumulate information on this variant and will reclassify it as either a positive or negative genetic test result when they are confident that they have adequate information  As updated information is obtained, we will notify Dian Sanders with the updated information  It is important to note that the majority of variants of uncertain significance are reclassified as likely benign or benign as additional information about the variant becomes available  Despite this result, Shira's first-degree relatives may be at increased risk for the cancers based on the family history  We recommend they discuss screening and management recommendations with their healthcare providers  If Dian Sanders has any affected family members with a cancer diagnosis, especially at a young age, they may still consider genetic testing  Relatives who wish to pursue genetic testing can reach out to the [address] [place] [address] Road ([id number]) to schedule an appointment or visit www [facility] org to identify a local genetic counselor  Risk Based on Family History:  The significance of this variant is currently not known and therefore this test result cannot be used to help determine Terra cancer risks  Rather her personal medical history and family history of cancer are the most important factors used to estimate her risk for developing certain cancers and to direct her medical management  Dangs risk of breast cancer may still be increased based on her personal risk factors and family history  Despite a negative test result, we are able to run risk models to better estimate Shira's lifetime risk of developing breast cancer   I ran three risk models based on the information Dian Sanders provided during our pre-test counseling session:    Tyrer-Ruthyzick model: Estimated lifetime risk, to age 80, for breast cancer is 15 8% compared to approximately 13% general population risk     Ben Blank model:Estimated lifetime risk, to age 80, for breast cancer is 9 9% compared to approximately 12 4% general population risk     Avinash tables: Estimated lifetime risk, to age 78, for breast cancer is 10 4%    Although these risk models do not predict a significantly increased lifetime risk, we cannot eliminate the possibility of an increased risk for breast cancer for Claudette Lopez given her family history  I also reminded Claudette Lopez that there is still a background risk for any woman to develop breast cancer over her lifetime (12-13%), regardless of family history  Additional Information:  A healthy lifestyle will improve overall health and reduce risk for illness  Eating a healthy diet and exercising for 4 hours per week is recommended  Both diet and exercise have been shown to help maintain a healthy weight  Postmenopausal women who are overweight are at higher risk for breast cancer  Moderate to heavy alcohol use can increase the risk for some cancers  Smoking cigarettes can also increase risk for breast, lung, prostate, pancreatic and other cancers  Plan:   There are no additional recommendations based on Terra's result  she should continue cancer screening and medical management as clinically indicated and as determined appropriate by her healthcare providers  VUS Result: Claudette Lopez was strongly encouraged to contact us regarding any changes in her personal or family history of cancer as these changes could alter our recommendation regarding genetic testing and/or cancer screening  Claudette Lopez was also encouraged to follow up with us on an annual basis as variant classifications are subject to change

## 2022-10-06 ENCOUNTER — ANESTHESIA EVENT (OUTPATIENT)
Dept: ANESTHESIOLOGY | Facility: HOSPITAL | Age: 42
End: 2022-10-06

## 2022-10-06 ENCOUNTER — ANESTHESIA (OUTPATIENT)
Dept: ANESTHESIOLOGY | Facility: HOSPITAL | Age: 42
End: 2022-10-06

## 2022-10-07 ENCOUNTER — ANESTHESIA (OUTPATIENT)
Dept: PERIOP | Facility: HOSPITAL | Age: 42
End: 2022-10-07
Payer: COMMERCIAL

## 2022-10-07 ENCOUNTER — ANESTHESIA EVENT (OUTPATIENT)
Dept: PERIOP | Facility: HOSPITAL | Age: 42
End: 2022-10-07
Payer: COMMERCIAL

## 2022-10-07 ENCOUNTER — HOSPITAL ENCOUNTER (OUTPATIENT)
Facility: HOSPITAL | Age: 42
Setting detail: OUTPATIENT SURGERY
Discharge: HOME | End: 2022-10-07
Attending: STUDENT IN AN ORGANIZED HEALTH CARE EDUCATION/TRAINING PROGRAM | Admitting: STUDENT IN AN ORGANIZED HEALTH CARE EDUCATION/TRAINING PROGRAM
Payer: COMMERCIAL

## 2022-10-07 VITALS
RESPIRATION RATE: 18 BRPM | HEART RATE: 96 BPM | WEIGHT: 160 LBS | DIASTOLIC BLOOD PRESSURE: 80 MMHG | SYSTOLIC BLOOD PRESSURE: 124 MMHG | OXYGEN SATURATION: 100 % | HEIGHT: 66 IN | TEMPERATURE: 98.2 F | BODY MASS INDEX: 25.71 KG/M2

## 2022-10-07 DIAGNOSIS — C43.59 MELANOMA OF BACK (HCC): ICD-10-CM

## 2022-10-07 LAB
EXT PREGNANCY TEST URINE: NEGATIVE
EXT. CONTROL: NORMAL

## 2022-10-07 PROCEDURE — 81025 URINE PREGNANCY TEST: CPT | Performed by: STUDENT IN AN ORGANIZED HEALTH CARE EDUCATION/TRAINING PROGRAM

## 2022-10-07 PROCEDURE — 11604 EXC TR-EXT MAL+MARG 3.1-4 CM: CPT

## 2022-10-07 PROCEDURE — 12032 INTMD RPR S/A/T/EXT 2.6-7.5: CPT

## 2022-10-07 PROCEDURE — 11604 EXC TR-EXT MAL+MARG 3.1-4 CM: CPT | Performed by: STUDENT IN AN ORGANIZED HEALTH CARE EDUCATION/TRAINING PROGRAM

## 2022-10-07 PROCEDURE — 88342 IMHCHEM/IMCYTCHM 1ST ANTB: CPT | Performed by: PATHOLOGY

## 2022-10-07 PROCEDURE — 12032 INTMD RPR S/A/T/EXT 2.6-7.5: CPT | Performed by: STUDENT IN AN ORGANIZED HEALTH CARE EDUCATION/TRAINING PROGRAM

## 2022-10-07 PROCEDURE — 88305 TISSUE EXAM BY PATHOLOGIST: CPT | Performed by: PATHOLOGY

## 2022-10-07 RX ORDER — PROPOFOL 10 MG/ML
INJECTION, EMULSION INTRAVENOUS AS NEEDED
Status: DISCONTINUED | OUTPATIENT
Start: 2022-10-07 | End: 2022-10-07

## 2022-10-07 RX ORDER — MAGNESIUM HYDROXIDE 1200 MG/15ML
LIQUID ORAL AS NEEDED
Status: DISCONTINUED | OUTPATIENT
Start: 2022-10-07 | End: 2022-10-07 | Stop reason: HOSPADM

## 2022-10-07 RX ORDER — CEFAZOLIN SODIUM 1 G/50ML
1000 SOLUTION INTRAVENOUS ONCE
Status: COMPLETED | OUTPATIENT
Start: 2022-10-07 | End: 2022-10-07

## 2022-10-07 RX ORDER — SODIUM CHLORIDE, SODIUM LACTATE, POTASSIUM CHLORIDE, CALCIUM CHLORIDE 600; 310; 30; 20 MG/100ML; MG/100ML; MG/100ML; MG/100ML
INJECTION, SOLUTION INTRAVENOUS CONTINUOUS PRN
Status: DISCONTINUED | OUTPATIENT
Start: 2022-10-07 | End: 2022-10-07

## 2022-10-07 RX ORDER — MIDAZOLAM HYDROCHLORIDE 2 MG/2ML
INJECTION, SOLUTION INTRAMUSCULAR; INTRAVENOUS AS NEEDED
Status: DISCONTINUED | OUTPATIENT
Start: 2022-10-07 | End: 2022-10-07

## 2022-10-07 RX ORDER — DEXAMETHASONE SODIUM PHOSPHATE 10 MG/ML
INJECTION, SOLUTION INTRAMUSCULAR; INTRAVENOUS AS NEEDED
Status: DISCONTINUED | OUTPATIENT
Start: 2022-10-07 | End: 2022-10-07

## 2022-10-07 RX ORDER — HYDROMORPHONE HCL/PF 1 MG/ML
1 SYRINGE (ML) INJECTION
Status: DISCONTINUED | OUTPATIENT
Start: 2022-10-07 | End: 2022-10-07 | Stop reason: HOSPADM

## 2022-10-07 RX ORDER — SODIUM CHLORIDE, SODIUM LACTATE, POTASSIUM CHLORIDE, CALCIUM CHLORIDE 600; 310; 30; 20 MG/100ML; MG/100ML; MG/100ML; MG/100ML
75 INJECTION, SOLUTION INTRAVENOUS CONTINUOUS
Status: DISCONTINUED | OUTPATIENT
Start: 2022-10-07 | End: 2022-10-07 | Stop reason: HOSPADM

## 2022-10-07 RX ORDER — FENTANYL CITRATE 50 UG/ML
INJECTION, SOLUTION INTRAMUSCULAR; INTRAVENOUS AS NEEDED
Status: DISCONTINUED | OUTPATIENT
Start: 2022-10-07 | End: 2022-10-07

## 2022-10-07 RX ORDER — ONDANSETRON 2 MG/ML
INJECTION INTRAMUSCULAR; INTRAVENOUS AS NEEDED
Status: DISCONTINUED | OUTPATIENT
Start: 2022-10-07 | End: 2022-10-07

## 2022-10-07 RX ORDER — PROMETHAZINE HYDROCHLORIDE 25 MG/ML
12.5 INJECTION, SOLUTION INTRAMUSCULAR; INTRAVENOUS ONCE AS NEEDED
Status: DISCONTINUED | OUTPATIENT
Start: 2022-10-07 | End: 2022-10-07 | Stop reason: HOSPADM

## 2022-10-07 RX ORDER — METOCLOPRAMIDE HYDROCHLORIDE 5 MG/ML
10 INJECTION INTRAMUSCULAR; INTRAVENOUS ONCE AS NEEDED
Status: DISCONTINUED | OUTPATIENT
Start: 2022-10-07 | End: 2022-10-07 | Stop reason: HOSPADM

## 2022-10-07 RX ORDER — LIDOCAINE HYDROCHLORIDE 10 MG/ML
INJECTION, SOLUTION EPIDURAL; INFILTRATION; INTRACAUDAL; PERINEURAL AS NEEDED
Status: DISCONTINUED | OUTPATIENT
Start: 2022-10-07 | End: 2022-10-07

## 2022-10-07 RX ADMIN — FENTANYL CITRATE 25 MCG: 50 INJECTION INTRAMUSCULAR; INTRAVENOUS at 10:00

## 2022-10-07 RX ADMIN — FENTANYL CITRATE 25 MCG: 50 INJECTION INTRAMUSCULAR; INTRAVENOUS at 10:43

## 2022-10-07 RX ADMIN — LIDOCAINE HYDROCHLORIDE 50 MG: 10 INJECTION, SOLUTION EPIDURAL; INFILTRATION; INTRACAUDAL; PERINEURAL at 09:53

## 2022-10-07 RX ADMIN — FENTANYL CITRATE 50 MCG: 50 INJECTION INTRAMUSCULAR; INTRAVENOUS at 09:53

## 2022-10-07 RX ADMIN — ONDANSETRON 4 MG: 2 INJECTION INTRAMUSCULAR; INTRAVENOUS at 10:33

## 2022-10-07 RX ADMIN — CEFAZOLIN SODIUM 1000 MG: 1 SOLUTION INTRAVENOUS at 09:45

## 2022-10-07 RX ADMIN — PROPOFOL 170 MG: 10 INJECTION, EMULSION INTRAVENOUS at 09:53

## 2022-10-07 RX ADMIN — SODIUM CHLORIDE, SODIUM LACTATE, POTASSIUM CHLORIDE, AND CALCIUM CHLORIDE: .6; .31; .03; .02 INJECTION, SOLUTION INTRAVENOUS at 09:48

## 2022-10-07 RX ADMIN — DEXAMETHASONE SODIUM PHOSPHATE 10 MG: 10 INJECTION, SOLUTION INTRAMUSCULAR; INTRAVENOUS at 09:53

## 2022-10-07 RX ADMIN — MIDAZOLAM 2 MG: 1 INJECTION INTRAMUSCULAR; INTRAVENOUS at 09:45

## 2022-10-07 NOTE — ANESTHESIA PREPROCEDURE EVALUATION
Procedure:  WIDE EXCISION OF UPPER BACK MELANOMA (N/A Back)    Relevant Problems   No relevant active problems        Physical Exam    Airway    Mallampati score: I  TM Distance: >3 FB  Neck ROM: full     Dental   No notable dental hx     Cardiovascular  Cardiovascular exam normal    Pulmonary  Pulmonary exam normal     Other Findings        Anesthesia Plan  ASA Score- 2     Anesthesia Type- general with ASA Monitors  Additional Monitors:   Airway Plan: ETT  Comment: ETT- prone  Plan Factors-    Chart reviewed  Existing labs reviewed  Patient summary reviewed  Patient is not a current smoker  Induction- intravenous  Postoperative Plan- Plan for postoperative opioid use  Planned trial extubation    Informed Consent- Anesthetic plan and risks discussed with patient  I personally reviewed this patient with the CRNA  Discussed and agreed on the Anesthesia Plan with the CRNA  Gualberto Ibarra

## 2022-10-07 NOTE — DISCHARGE INSTRUCTIONS
Wait to shower till Saturday 10/8/22  No dressing needed  Take medication as prescribed     Excision of Skin Lesion   AMBULATORY CARE:   What you need to know about excision of a skin lesion:  Excision of a skin lesion is surgery to remove a piece of skin tissue  The skin tissue may be malignant (skin cancer) or it may be benign  Benign means the skin tissue does not have cancer cells and cannot spread  How to prepare for excision of a skin lesion:  Your healthcare provider will talk to you about how to prepare for surgery  He may tell you not to eat or drink anything after midnight on the day of your surgery  He will tell you what medicines to take or not take on the day of your surgery  You may be given an antibiotic through your IV to help prevent a bacterial infection  What will happen during excision of a skin lesion:  You will be given local anesthesia to numb the surgery area  With local anesthesia, you may still feel pressure or pushing during surgery, but you should not feel any sharp pain  Your healthcare provider will yadira the area of your skin that will be removed  He will make an incision on the marked area  He will remove the outer layer of your skin  He may also remove deeper layers of tissue underneath your skin  He may use heat to stop any bleeding  He will close the incision with stitches, staples, tissue glue, or medical tape  He may cover your incision with a bandage  Your healthcare provider may send samples of your tissue to the lab for tests  What will happen after excision of a skin lesion:  Your stitches will need to be removed after a period of time  The amount of time depends on the part of the body where the surgery was done  Stitches on the face will be removed within 5 to 7 days  Stitches on the trunk of your body will be removed within 7 to 10 days  Stitches on your arms or legs will be removed within 10 to 14 days   Medical tape usually falls off on its own in about 7 to 10 days   Risks of excision of a skin lesion:  You may bleed more than expected or get an infection  You may lose feeling, or you may feel tingling or prickling in the surgery area  Your scar may not look the way you expected  It may also limit your movement or affect your expressions if you had surgery on your face  Seek care immediately if:   Your stitches come apart and the wound opens  Contact your healthcare provider if:   You have pain in your incision that does not get better with medicine  You have a fever or chills  Your wound is red, swollen, bleeding, or draining pus  You have questions or concerns about your condition or care  Care for your wound as directed:  Carefully wash the wound with soap and water  Pat dry the area  You may take a shower 24 hours after  your surgery  Do not  soak in water (bathtub, hot tub, swimming pool) until after your stitches have been removed  Check your wound for signs of infection such as redness, swelling, or pus drainage  Follow up with your healthcare provider as directed: You will need to return to have your stitches removed  Write down your questions so you remember to ask them during your visits  © Copyright [employer] 2022 Information is for End User's use only and may not be sold, redistributed or otherwise used for commercial purposes  All illustrations and images included in CareNotes® are the copyrighted property of A D A M , Inc  or Eldon Mcdonough  The above information is an  only  It is not intended as medical advice for individual conditions or treatments  Talk to your doctor, nurse or pharmacist before following any medical regimen to see if it is safe and effective for you

## 2022-10-07 NOTE — INTERVAL H&P NOTE
H&P reviewed  After examining the patient I find no changes in the patients condition since the H&P had been written      Vitals:    10/07/22 0839   BP: 142/78   Pulse: 89   Resp: 18   Temp: (!) 97 1 °F (36 2 °C)   SpO2: 99%

## 2022-10-07 NOTE — ANESTHESIA POSTPROCEDURE EVALUATION
Post-Op Assessment Note    CV Status:  Stable  Pain Score: 0    Pain management: adequate     Mental Status:  Alert and awake   Hydration Status:  Euvolemic   PONV Controlled:  Controlled   Airway Patency:  Patent   Two or more mitigation strategies used for obstructive sleep apnea   Post Op Vitals Reviewed: Yes      Staff: CRNA         No complications documented      BP   106/75   Temp  97 2   Pulse  80   Resp   16   SpO2   96

## 2022-10-07 NOTE — OP NOTE
OPERATIVE REPORT  PATIENT NAME: Kristie Fofana    :  1980  MRN: [id number]  Pt Location: [facility] OR ROOM 15    SURGERY DATE: 10/7/2022    Surgeon(s) and Role:     * Roe Crowder MD - Primary     * Loulou Powers PA-C - Assisting    Preop Diagnosis:  Melanoma of back ([facility] [address] ) [C43 59]    Post-Op Diagnosis Codes:     * Melanoma of back ([facility] [address] ) [C43 59]    Procedure(s) (LRB):  WIDE EXCISION OF UPPER BACK MELANOMA (N/A)    Specimen(s):  ID Type Source Tests Collected by Time Destination   1 : upper back melanoma Tissue Lesion TISSUE EXAM Roe Crowder MD 10/7/2022 1022        Estimated Blood Loss:   Minimal    Drains:  * No LDAs found *    Anesthesia Type:   General    Operative Indications:  Melanoma of back ([facility] [address] ) [C43 59]  T1a melanoma    Operative Findings:  Uneventful WLE    Complications:   None    Procedure and Technique:  Pt positioned and prepped and draped in usual sterile fashion  Timeout performed  We then turned our attention to the primary melanoma of the back  The margins of the melanoma scar were marked at 1 1 x 1 2 cm  A circumferential margin of 1 cm was marked  Local was injected  The skin was incised sharply to the pre-muscular fascia  The melanoma was then dissected off of the fascia with electrocautery  The specimen was marked with a stitch at 12 o clock  The defect was copiously irrigated and found to be hemostatic  Skin flaps were then raised both medially and laterally to facilitate a tension-free closure  The fascia was re-approximated with 2-0 vicryl to lessen the concavity defect  The deep dermal tissues were then closed with interrupted 2-0 Vicryl suture and the epidermis was closed with 3-0 monocryl followed by skin glue and steris  The patient was then turned supine and awakened and taken to the PACU in stable condition       I was present for the entire procedure and A physician assistant was required during the procedure for retraction tissue handling,dissection and suturing    Patient Disposition:  PACU      Primary Cutaneous Melanoma:  Operation performed with curative intent Yes    Original Breslow thickness of the lesion 0 4 mm (to the tenth of a millimeter)   Clinical margin width (measured from the edge of the lesion or the prior excision scar) 1 cm   Depth of excision Full-thickness skin/subcutaneous tissue down to fascia (melanoma)         SIGNATURE: Kyung Narvaez MD  DATE: October 7, 2022  TIME: 11:17 AM

## 2022-10-20 ENCOUNTER — OFFICE VISIT (OUTPATIENT)
Dept: SURGICAL ONCOLOGY | Facility: CLINIC | Age: 42
End: 2022-10-20

## 2022-10-20 ENCOUNTER — TELEPHONE (OUTPATIENT)
Dept: HEMATOLOGY ONCOLOGY | Facility: CLINIC | Age: 42
End: 2022-10-20

## 2022-10-20 VITALS
DIASTOLIC BLOOD PRESSURE: 90 MMHG | WEIGHT: 167 LBS | HEART RATE: 97 BPM | OXYGEN SATURATION: 100 % | BODY MASS INDEX: 26.84 KG/M2 | SYSTOLIC BLOOD PRESSURE: 154 MMHG | TEMPERATURE: 97.7 F | HEIGHT: 66 IN

## 2022-10-20 DIAGNOSIS — C43.59 MELANOMA OF BACK (HCC): Primary | ICD-10-CM

## 2022-10-20 PROCEDURE — 99024 POSTOP FOLLOW-UP VISIT: CPT | Performed by: STUDENT IN AN ORGANIZED HEALTH CARE EDUCATION/TRAINING PROGRAM

## 2022-10-20 RX ORDER — OMEPRAZOLE 20 MG/1
20 CAPSULE, DELAYED RELEASE ORAL DAILY
COMMUNITY

## 2022-10-20 NOTE — PROGRESS NOTES
Surgical Oncology Consultation    [address]  [facility] [place]  [address] [facility]  [place] [address] [facility]  [phone]    Patient:  Vivian Sultana  1980  [id number]    Primary Care provider:  No primary care provider on file  No primary provider on file  Referring provider:  No referring provider defined for this encounter  Diagnoses and all orders for this visit:    Melanoma of back ([facility] [address] )    Other orders  -     omeprazole (PriLOSEC) 20 mg delayed release capsule; Take 20 mg by mouth daily        Chief Complaint   Patient presents with   • Post-op       No follow-ups on file  Oncology History   Melanoma of back [facility])   8/11/2022 -  Cancer Staged    Staging form: Melanoma of the Skin, AJCC 8th Edition  - Clinical stage from 8/11/2022: Stage IA (cT1a, cN0, cM0) - Signed by Haile Baer MD on 9/12/2022 8/11/2022 Biopsy    Right upper back, shave biopsy  0 4mm  No ulceration   No mitoses      9/12/2022 Initial Diagnosis    Melanoma of back ([facility] [address] )         History of Present Illness  : This is a 61-year-old female with a new diagnosis of a T1a melanoma s/p WLE  Path pending  Patient is doing well with no complaints or concerns    Review of Systems  Complete ROS Surg Onc:   Constitutional: The patient denies new or recent history of general fatigue, no recent weight loss, no change in appetite  Eyes: No complaints of visual problems, no scleral icterus  ENT: No complaints of ear pain, no hoarseness, no difficulty swallowing,  no tinnitus and no new masses in head, oral cavity, or neck  Cardiovascular: No complaints of chest pain, no palpitations, no ankle edema  Respiratory: No complaints of shortness of breath, no cough  Gastrointestinal: No complaints of jaundice, no bloody stools, no pale stools  Genitourinary: No complaints of dysuria, no hematuria, no nocturia, no frequent urination, no urethral discharge     Musculoskeletal: No complaints of weakness, paralysis, joint stiffness or arthralgias  Integumentary: No complaints of rash, no new lesions  Neurological: No complaints of convulsions, no seizures, no dizziness  Hematologic/Lymphatic: No complaints of easy bruising  Endocrine:  No hot or cold intolerance  No polydipsia, polyphagia, or polyuria  Allergy/immunology:  No environmental allergies  No food allergies  Not immunocompromised  Patient Active Problem List   Diagnosis   • Melanoma of back [facility])     Past Medical History:   Diagnosis Date   • Asthma    • Cancer ([facility] [address] )     melanoma   • COVID-19 08/2021    mild s/s-fully vaccinated   • History of transfusion    • Pneumonia     as a child   • Shortness of breath     allergies trigger/on exersion     Past Surgical History:   Procedure Laterality Date   • APPENDECTOMY  2011   • CHOLECYSTECTOMY  2011   • ENDOMETRIAL ABLATION N/A 2021   • HERNIA REPAIR  1684    umbilical hernia   • INTESTINAL MALROTATION REPAIR N/A 11/2015   • SKIN LESION EXCISION N/A 10/7/2022    Procedure: WIDE EXCISION OF UPPER BACK MELANOMA;  Surgeon: Africa Marquez MD;  Location: [facility];  Service: Surgical Oncology   • SPINAL FUSION N/A 11/2005    with [place] shira-     No family history on file  Social History     Socioeconomic History   • Marital status: /Civil Union     Spouse name: Not on file   • Number of children: Not on file   • Years of education: Not on file   • Highest education level: Not on file   Occupational History   • Not on file   Tobacco Use   • Smoking status: Never Smoker   • Smokeless tobacco: Never Used   Vaping Use   • Vaping Use: Never used   Substance and Sexual Activity   • Alcohol use:  Yes     Alcohol/week: 2 0 standard drinks     Types: 2 Glasses of wine per week     Comment: 2 glasses wine bi-weekly   • Drug use: Yes     Types: Marijuana     Comment: medical   • Sexual activity: Not Currently   Other Topics Concern   • Not on file   Social History Narrative • Not on file     Social Determinants of Health     Financial Resource Strain: Not on file   Food Insecurity: Not on file   Transportation Needs: Not on file   Physical Activity: Not on file   Stress: Not on file   Social Connections: Not on file   Intimate Partner Violence: Not on file   Housing Stability: Not on file       Current Outpatient Medications:   •  albuterol (PROVENTIL HFA,VENTOLIN HFA) 90 mcg/act inhaler, Inhale 2 puffs every 6 (six) hours as needed for wheezing, Disp: , Rfl:   •  budesonide-formoterol (SYMBICORT) 80-4 5 MCG/ACT inhaler, Inhale 2 puffs 2 (two) times a day Rinse mouth after use , Disp: , Rfl:   •  cholecalciferol (VITAMIN D3) 1,000 units tablet, Take 1,000 Units by mouth daily, Disp: , Rfl:   •  cyclobenzaprine (FLEXERIL) 5 mg tablet, Take 5 mg by mouth 3 (three) times a day as needed for muscle spasms, Disp: , Rfl:   •  ferrous sulfate 325 (65 Fe) mg tablet, Take 325 mg by mouth daily with breakfast, Disp: , Rfl:   •  gabapentin (NEURONTIN) 300 mg capsule, Take 300 mg by mouth daily at bedtime, Disp: , Rfl:   •  omeprazole (PriLOSEC) 20 mg delayed release capsule, Take 20 mg by mouth daily, Disp: , Rfl:   •  ondansetron (ZOFRAN) 4 mg tablet, Take 4 mg by mouth every 8 (eight) hours as needed for nausea or vomiting, Disp: , Rfl:   •  pantoprazole (PROTONIX) 40 mg tablet, Take 40 mg by mouth daily, Disp: , Rfl:   •  traMADol (Ultram) 50 mg tablet, Take 1 tablet (50 mg total) by mouth every 6 (six) hours as needed for moderate pain (Patient not taking: No sig reported), Disp: 10 tablet, Rfl: 0  Allergies   Allergen Reactions   • Zithromax [Azithromycin] Nausea Only       Vitals:    10/20/22 1331   BP: 154/90   Pulse: 97   Temp: 97 7 °F (36 5 °C)   SpO2: 100%       Physical Exam   General: Appears well, appears stated age  Skin: Warm, anicteric   Upper back melanoma WLE site healing well  HEENT: Normocephalic, atraumatic; sclera aniceteric, mucous membranes moist; cervical nodes without adenopathy  Cardiopulmonary: RRR, Easy WOB, no BLE edema  Abd: Flat and soft, nontender, no masses appreciated, no hepatosplenomegaly  MSK: Symmetric, no cyanosis, no overt weakness  Lymphatic: No cervical, axillary or inguinal lymphadenopathy  Neuro: Affect appropriate, no gross motor abnormalities      Pathology:  Final Diagnosis   A  Skin, Right Superior Medial Upper Back, Shave Biopsy (3 slides [id number] [facility], collected 8/11/2022):     Consistent with MELANOMA (thickness: 0 4 mm); extending to the tissue edges (see note)      Note: The provided immuostains was reviewed  Please see synoptic report for additional details      Synoptic report for melanoma of the skin  Thickness: 0 4 mm  Ulceration: not seen  Anatomic Stacy Hansen) level: II/III  Type: superficial spreading melanoma  Mitoses: 0/mm2  Microsatellites: not seen  Lymphovascular invasion: not seen  Neurotropism: not seen  Tumor regression: not seen  Margin assessment: melanoma in situ extends to peripheral specimen margin and deep margin (via follicular extension)  Pathologic stage: pT1a  Associated nevus: compound dysplastic nevus       Labs: Reviewed in EPIC    Imaging  No results found  I independently reviewed and interpreted the above laboratory and imaging data  Discussion/Summary:   45 yo female with T1a melanoma s/p WLE  Path pending  Will call pt with results  Will call with results and change in plans, however standard would be surveillance with q3 mo visits with derm for skin checks as well as q6 mo visits with me for repeat PE  Discussed vigilance about new skin lesions in this region or new lumps/bumps in LN basins  Discussed need for continued sun protection with sunscreen, hats, minimizing sun exposure  Patient and family expressed understanding and endorsement

## 2022-10-20 NOTE — TELEPHONE ENCOUNTER
Patient has post op appt with Dr Karli Campos on 10/20/22  Left message for patient to call the office to schedule 6 mo FU appt with Dr Karli Campos

## 2022-10-21 ENCOUNTER — TELEPHONE (OUTPATIENT)
Dept: HEMATOLOGY ONCOLOGY | Facility: CLINIC | Age: 42
End: 2022-10-21

## 2022-10-21 NOTE — TELEPHONE ENCOUNTER
Called patient to advise that her pathology report is still pending, therefore her follow up appointment on Monday 10/24/2022 must be rescheduled  Both numbers on file are non-working numbers  Patient is [employer] active, recent log in on 10/19/2022  Will send a [employer] message  ----- Message from Ana Arreguin RN sent at 10/21/2022  2:41 PM EDT -----  Hi,    This patient's pathology is still pending from surgery and she will need to be rescheduled  She is currently on the schedule to see Dr Paresh Pena on Monday  Thank you       Markel Wheeler

## 2022-10-25 ENCOUNTER — TELEPHONE (OUTPATIENT)
Dept: SURGICAL ONCOLOGY | Facility: CLINIC | Age: 42
End: 2022-10-25

## 2022-10-25 PROCEDURE — 88305 TISSUE EXAM BY PATHOLOGIST: CPT | Performed by: PATHOLOGY

## 2022-10-25 PROCEDURE — 88342 IMHCHEM/IMCYTCHM 1ST ANTB: CPT | Performed by: PATHOLOGY

## 2022-10-25 NOTE — TELEPHONE ENCOUNTER
Called pt to discuss final path  No additional cancer detected  Instructed to call with any questions

## 2022-11-07 ENCOUNTER — OFFICE VISIT (OUTPATIENT)
Dept: HEMATOLOGY ONCOLOGY | Facility: CLINIC | Age: 42
End: 2022-11-07

## 2022-11-07 VITALS
OXYGEN SATURATION: 99 % | HEART RATE: 81 BPM | SYSTOLIC BLOOD PRESSURE: 128 MMHG | BODY MASS INDEX: 26.52 KG/M2 | HEIGHT: 66 IN | DIASTOLIC BLOOD PRESSURE: 84 MMHG | RESPIRATION RATE: 16 BRPM | TEMPERATURE: 98 F | WEIGHT: 165 LBS

## 2022-11-07 DIAGNOSIS — C43.59 MELANOMA OF BACK (HCC): Primary | ICD-10-CM

## 2022-11-07 NOTE — LETTER
November 13, 2022     David Flores [id number]  [address] [facility] [address]    Patient: Judson Gutierrez   YOB: 1980   Date of Visit: 11/7/2022       Dear Dr Alexia Key: Thank you for referring Judson Gutierrez to me for evaluation  Below are my notes for this consultation  If you have questions, please do not hesitate to call me  I look forward to following your patient along with you  Sincerely,        Lizett Rose MD        CC: No Recipients  Lizett Rose MD  11/13/2022 11:29 AM  Sign when Signing Visit  [address] [place] [address] 3  [address] [place] [address] 58  [phone]     Date of Visit: 11/7/2022  Name: Judson Gutierrez   YOB: 1980        Subjective    VISIT DIAGNOSIS:  Diagnoses and all orders for this visit:    Melanoma of back ([facility] [place] [address] )  -     CBC and differential; Future  -     Comprehensive metabolic panel; Future  -     LD,Blood; Future        Oncology History   Melanoma of [facility])   8/11/2022 -  Cancer Staged    Staging form: Melanoma of the Skin, AJCC 8th Edition  - Clinical stage from 8/11/2022: Stage IA (cT1a, cN0, cM0) - Signed by Lizett Rose MD on 9/12/2022 8/11/2022 Biopsy    Right upper back, shave biopsy  0 4mm  No ulceration   No mitoses      9/12/2022 Initial Diagnosis    Melanoma of [facility])        Cancer Staging  Melanoma of [facility])  Staging form: Melanoma of the Skin, AJCC 8th Edition  - Clinical stage from 8/11/2022: Stage IA (cT1a, cN0, cM0) - Signed by Lizett Rose MD on 9/12/2022       HISTORY OF PRESENT ILLNESS: Judson Gutierrez is a 46 y o  female  who has stage IA melanoma here for follow-up  She underwent wide local excision with Dr Alexia Key on 10/07/2022  Pathology was negative for residual melanoma and all margins were negative  She is otherwise doing well and feeling good  Recovered from surgery  Denies any new, changing, concerning skin lesions    Denies any lymphadenopathy  REVIEW OF SYSTEMS:  Review of Systems   Constitutional: Negative for appetite change, fatigue, fever and unexpected weight change  HENT:   Negative for lump/mass  Eyes: Negative for icterus  Respiratory: Negative for cough, shortness of breath and wheezing  Cardiovascular: Negative for leg swelling  Gastrointestinal: Negative for abdominal pain, constipation, diarrhea, nausea and vomiting  Genitourinary: Negative for difficulty urinating and hematuria  Musculoskeletal: Negative for arthralgias, gait problem and myalgias  Skin: Negative for itching and rash  No new, changing, or concerning lesions  Neurological: Negative for extremity weakness, gait problem, headaches, light-headedness and numbness  Hematological: Negative for adenopathy          MEDICATIONS:    Current Outpatient Medications:   •  albuterol (PROVENTIL HFA,VENTOLIN HFA) 90 mcg/act inhaler, Inhale 2 puffs every 6 (six) hours as needed for wheezing, Disp: , Rfl:   •  budesonide-formoterol (SYMBICORT) 80-4 5 MCG/ACT inhaler, Inhale 2 puffs 2 (two) times a day Rinse mouth after use , Disp: , Rfl:   •  cholecalciferol (VITAMIN D3) 1,000 units tablet, Take 1,000 Units by mouth daily, Disp: , Rfl:   •  cyclobenzaprine (FLEXERIL) 5 mg tablet, Take 5 mg by mouth 3 (three) times a day as needed for muscle spasms, Disp: , Rfl:   •  ferrous sulfate 325 (65 Fe) mg tablet, Take 325 mg by mouth daily with breakfast, Disp: , Rfl:   •  gabapentin (NEURONTIN) 300 mg capsule, Take 300 mg by mouth daily at bedtime, Disp: , Rfl:   •  omeprazole (PriLOSEC) 20 mg delayed release capsule, Take 20 mg by mouth daily, Disp: , Rfl:   •  ondansetron (ZOFRAN) 4 mg tablet, Take 4 mg by mouth every 8 (eight) hours as needed for nausea or vomiting, Disp: , Rfl:   •  pantoprazole (PROTONIX) 40 mg tablet, Take 40 mg by mouth daily, Disp: , Rfl:   •  traMADol (Ultram) 50 mg tablet, Take 1 tablet (50 mg total) by mouth every 6 (six) hours as needed for moderate pain (Patient not taking: No sig reported), Disp: 10 tablet, Rfl: 0     ALLERGIES:  Allergies   Allergen Reactions   • Zithromax [Azithromycin] Nausea Only        ACTIVE PROBLEMS:  Patient Active Problem List   Diagnosis   • Melanoma of back [facility])          PAST MEDICAL HISTORY:   Past Medical History:   Diagnosis Date   • Asthma    • Cancer ([facility] [address] )     melanoma   • COVID-19 08/2021    mild s/s-fully vaccinated   • History of transfusion    • Pneumonia     as a child   • Shortness of breath     allergies trigger/on exersion        PAST SURGICAL HISTORY:  Past Surgical History:   Procedure Laterality Date   • APPENDECTOMY  2011   • CHOLECYSTECTOMY  2011   • ENDOMETRIAL ABLATION N/A 2021   • HERNIA REPAIR  7875    umbilical hernia   • INTESTINAL MALROTATION REPAIR N/A 11/2015   • SKIN LESION EXCISION N/A 10/7/2022    Procedure: WIDE EXCISION OF UPPER BACK MELANOMA;  Surgeon: Selena Lebron MD;  Location: [facility];  Service: Surgical Oncology   • SPINAL FUSION N/A 11/2005    with [place] shira-        SOCIAL HISTORY:  Social History     Socioeconomic History   • Marital status: /Civil Union     Spouse name: None   • Number of children: None   • Years of education: None   • Highest education level: None   Occupational History   • None   Tobacco Use   • Smoking status: Never Smoker   • Smokeless tobacco: Never Used   Vaping Use   • Vaping Use: Never used   Substance and Sexual Activity   • Alcohol use:  Yes     Alcohol/week: 2 0 standard drinks     Types: 2 Glasses of wine per week     Comment: 2 glasses wine bi-weekly   • Drug use: Yes     Types: Marijuana     Comment: medical   • Sexual activity: Not Currently   Other Topics Concern   • None   Social History Narrative   • None     Social Determinants of Health     Financial Resource Strain: Not on file   Food Insecurity: Not on file   Transportation Needs: Not on file   Physical Activity: Not on file   Stress: Not on file   Social Connections: Not on file   Intimate Partner Violence: Not on file   Housing Stability: Not on file        FAMILY HISTORY:  History reviewed  No pertinent family history  Objective    PHYSICAL EXAMINATION:   Blood pressure 128/84, pulse 81, temperature 98 °F (36 7 °C), resp  rate 16, height 5' 6" (1 676 m), weight 74 8 kg (165 lb), last menstrual period 10/16/2022, SpO2 99 %  Pain Score: 0-No pain     ECOG Performance Status    Flowsheet Row Most Recent Value   ECOG Performance Status 0 - Fully active, able to carry on all pre-disease performance without restriction             Physical Exam  Constitutional:       General: She is not in acute distress  Appearance: Normal appearance  She is not toxic-appearing  HENT:      Mouth/Throat:      Mouth: Mucous membranes are moist       Pharynx: Oropharynx is clear  Eyes:      General: No scleral icterus  Pulmonary:      Effort: Pulmonary effort is normal  No respiratory distress  Breath sounds: No wheezing or rales  Abdominal:      General: There is no distension  Palpations: There is no mass  Musculoskeletal:         General: No swelling or tenderness  Right lower leg: No edema  Left lower leg: No edema  Skin:     Findings: No rash  Comments: Well healed surgical scar  No evidence of recurrence at primary site  Neurological:      General: No focal deficit present  Mental Status: She is alert and oriented to person, place, and time  Psychiatric:         Mood and Affect: Mood normal          Behavior: Behavior normal          Thought Content: Thought content normal          Judgment: Judgment normal          I reviewed lab data in the chart      No results found for: WBC, HGB, PLT, MCV   No results found for: NA, K, CL, CO2, BUN, CREATININE, GLUC, EGFRNAA, EGFRAA, CALCIUM, PROT, ALB, TBILI, ALKPHOS, AST, ALT   No results found for: LDH  No results found for: TSH  No results found for: M9FCSPL   No results found for: FREET4      RECENT IMAGING:  No results found  Assessment/Plan  Ms Orly Ortiz is a 46 yr female with stage IA melanoma here for follow-up and surveillance  1  Melanoma of back ([address] )  Underwent excision with no residual melanoma  She remains a stage IA  We discussed the low risk of recurrence but is more at risk for in [address]  We will continue with follow-up and surveillance every 6 months for a few years with physical exams and blood work  She will continue close follow-up with Dermatology as well  She knows to call with any issues or concerns prior to her next visit  She will return in 6 months  Orders placed for blood work at that time and scripts provided  - CBC and differential; Future  - Comprehensive metabolic panel; Future  - LD,Blood; Future        Return in about 6 months (around 5/7/2023) for Office Visit, labs       Caryn Mitchell MD, PhD

## 2022-11-13 NOTE — PROGRESS NOTES
[facility] [address]  [address] [facility] [id number] [phone]     Date of Visit: 11/7/2022  Name: Judson Gutierrez   YOB: 1980        Subjective    VISIT DIAGNOSIS:  Diagnoses and all orders for this visit:    Melanoma of back ([address] )  -     CBC and differential; Future  -     Comprehensive metabolic panel; Future  -     LD,Blood; Future        Oncology History   Melanoma of [facility])   8/11/2022 -  Cancer Staged    Staging form: Melanoma of the Skin, AJCC 8th Edition  - Clinical stage from 8/11/2022: Stage IA (cT1a, cN0, cM0) - Signed by Lizett Rose MD on 9/12/2022 8/11/2022 Biopsy    Right upper back, shave biopsy  0 4mm  No ulceration   No mitoses      9/12/2022 Initial Diagnosis    Melanoma of [facility])        Cancer Staging  Melanoma of [facility])  Staging form: Melanoma of the Skin, AJCC 8th Edition  - Clinical stage from 8/11/2022: Stage IA (cT1a, cN0, cM0) - Signed by Lizett Rose MD on 9/12/2022       HISTORY OF PRESENT ILLNESS: Judson Gutierrez is a 46 y o  female  who has stage IA melanoma here for follow-up  She underwent wide local excision with Dr Alexia Key on 10/07/2022  Pathology was negative for residual melanoma and all margins were negative  She is otherwise doing well and feeling good  Recovered from surgery  Denies any new, changing, concerning skin lesions  Denies any lymphadenopathy  REVIEW OF SYSTEMS:  Review of Systems   Constitutional: Negative for appetite change, fatigue, fever and unexpected weight change  HENT:   Negative for lump/mass  Eyes: Negative for icterus  Respiratory: Negative for cough, shortness of breath and wheezing  Cardiovascular: Negative for leg swelling  Gastrointestinal: Negative for abdominal pain, constipation, diarrhea, nausea and vomiting  Genitourinary: Negative for difficulty urinating and hematuria      Musculoskeletal: Negative for arthralgias, gait problem and myalgias  Skin: Negative for itching and rash  No new, changing, or concerning lesions  Neurological: Negative for extremity weakness, gait problem, headaches, light-headedness and numbness  Hematological: Negative for adenopathy          MEDICATIONS:    Current Outpatient Medications:   •  albuterol (PROVENTIL HFA,VENTOLIN HFA) 90 mcg/act inhaler, Inhale 2 puffs every 6 (six) hours as needed for wheezing, Disp: , Rfl:   •  budesonide-formoterol (SYMBICORT) 80-4 5 MCG/ACT inhaler, Inhale 2 puffs 2 (two) times a day Rinse mouth after use , Disp: , Rfl:   •  cholecalciferol (VITAMIN D3) 1,000 units tablet, Take 1,000 Units by mouth daily, Disp: , Rfl:   •  cyclobenzaprine (FLEXERIL) 5 mg tablet, Take 5 mg by mouth 3 (three) times a day as needed for muscle spasms, Disp: , Rfl:   •  ferrous sulfate 325 (65 Fe) mg tablet, Take 325 mg by mouth daily with breakfast, Disp: , Rfl:   •  gabapentin (NEURONTIN) 300 mg capsule, Take 300 mg by mouth daily at bedtime, Disp: , Rfl:   •  omeprazole (PriLOSEC) 20 mg delayed release capsule, Take 20 mg by mouth daily, Disp: , Rfl:   •  ondansetron (ZOFRAN) 4 mg tablet, Take 4 mg by mouth every 8 (eight) hours as needed for nausea or vomiting, Disp: , Rfl:   •  pantoprazole (PROTONIX) 40 mg tablet, Take 40 mg by mouth daily, Disp: , Rfl:   •  traMADol (Ultram) 50 mg tablet, Take 1 tablet (50 mg total) by mouth every 6 (six) hours as needed for moderate pain (Patient not taking: No sig reported), Disp: 10 tablet, Rfl: 0     ALLERGIES:  Allergies   Allergen Reactions   • Zithromax [Azithromycin] Nausea Only        ACTIVE PROBLEMS:  Patient Active Problem List   Diagnosis   • Melanoma of back [facility])          PAST MEDICAL HISTORY:   Past Medical History:   Diagnosis Date   • Asthma    • Cancer ([facility] [address] )     melanoma   • COVID-19 08/2021    mild s/s-fully vaccinated   • History of transfusion    • Pneumonia     as a child   • Shortness of breath     allergies trigger/on exersion PAST SURGICAL HISTORY:  Past Surgical History:   Procedure Laterality Date   • APPENDECTOMY  2011   • CHOLECYSTECTOMY  2011   • ENDOMETRIAL ABLATION N/A 2021   • HERNIA REPAIR  5861    umbilical hernia   • INTESTINAL MALROTATION REPAIR N/A 11/2015   • SKIN LESION EXCISION N/A 10/7/2022    Procedure: WIDE EXCISION OF UPPER BACK MELANOMA;  Surgeon: Xander Diaz MD;  Location: [facility];  Service: Surgical Oncology   • SPINAL FUSION N/A 11/2005    with [place] shira-        SOCIAL HISTORY:  Social History     Socioeconomic History   • Marital status: /Civil Union     Spouse name: None   • Number of children: None   • Years of education: None   • Highest education level: None   Occupational History   • None   Tobacco Use   • Smoking status: Never Smoker   • Smokeless tobacco: Never Used   Vaping Use   • Vaping Use: Never used   Substance and Sexual Activity   • Alcohol use: Yes     Alcohol/week: 2 0 standard drinks     Types: 2 Glasses of wine per week     Comment: 2 glasses wine bi-weekly   • Drug use: Yes     Types: Marijuana     Comment: medical   • Sexual activity: Not Currently   Other Topics Concern   • None   Social History Narrative   • None     Social Determinants of Health     Financial Resource Strain: Not on file   Food Insecurity: Not on file   Transportation Needs: Not on file   Physical Activity: Not on file   Stress: Not on file   Social Connections: Not on file   Intimate Partner Violence: Not on file   Housing Stability: Not on file        FAMILY HISTORY:  History reviewed  No pertinent family history  Objective    PHYSICAL EXAMINATION:   Blood pressure 128/84, pulse 81, temperature 98 °F (36 7 °C), resp  rate 16, height 5' 6" (1 676 m), weight 74 8 kg (165 lb), last menstrual period 10/16/2022, SpO2 99 %       Pain Score: 0-No pain     ECOG Performance Status    Flowsheet Row Most Recent Value   ECOG Performance Status 0 - Fully active, able to carry on all pre-disease performance without restriction             Physical Exam  Constitutional:       General: She is not in acute distress  Appearance: Normal appearance  She is not toxic-appearing  HENT:      Mouth/Throat:      Mouth: Mucous membranes are moist       Pharynx: Oropharynx is clear  Eyes:      General: No scleral icterus  Pulmonary:      Effort: Pulmonary effort is normal  No respiratory distress  Breath sounds: No wheezing or rales  Abdominal:      General: There is no distension  Palpations: There is no mass  Musculoskeletal:         General: No swelling or tenderness  Right lower leg: No edema  Left lower leg: No edema  Skin:     Findings: No rash  Comments: Well healed surgical scar  No evidence of recurrence at primary site  Neurological:      General: No focal deficit present  Mental Status: She is alert and oriented to person, place, and time  Psychiatric:         Mood and Affect: Mood normal          Behavior: Behavior normal          Thought Content: Thought content normal          Judgment: Judgment normal          I reviewed lab data in the chart  No results found for: WBC, HGB, PLT, MCV   No results found for: NA, K, CL, CO2, BUN, CREATININE, GLUC, EGFRNAA, EGFRAA, CALCIUM, PROT, ALB, TBILI, ALKPHOS, AST, ALT   No results found for: LDH  No results found for: TSH  No results found for: [id number]   No results found for: FREET4      RECENT IMAGING:  No results found  Assessment/Plan  Ms Nellie Matias is a 46 yr female with stage IA melanoma here for follow-up and surveillance  1  Melanoma of back ([address] )  Underwent excision with no residual melanoma  She remains a stage IA  We discussed the low risk of recurrence but is more at risk for in [address]  We will continue with follow-up and surveillance every 6 months for a few years with physical exams and blood work  She will continue close follow-up with Dermatology as well    She knows to call with any issues or concerns prior to her next visit  She will return in 6 months  Orders placed for blood work at that time and scripts provided  - CBC and differential; Future  - Comprehensive metabolic panel; Future  - LD,Blood; Future        Return in about 6 months (around 5/7/2023) for Office Visit, labs       Gabbi Blas MD, PhD

## 2022-11-17 ENCOUNTER — CONSULT (OUTPATIENT)
Dept: DERMATOLOGY | Facility: CLINIC | Age: 42
End: 2022-11-17

## 2022-11-17 VITALS — WEIGHT: 164.8 LBS | TEMPERATURE: 98.1 F | HEIGHT: 66 IN | BODY MASS INDEX: 26.48 KG/M2

## 2022-11-17 DIAGNOSIS — D48.5 NEOPLASM OF UNCERTAIN BEHAVIOR OF SKIN: ICD-10-CM

## 2022-11-17 DIAGNOSIS — D22.9 MULTIPLE BENIGN MELANOCYTIC NEVI: ICD-10-CM

## 2022-11-17 DIAGNOSIS — Z85.820 HISTORY OF MELANOMA: Primary | ICD-10-CM

## 2022-11-17 DIAGNOSIS — C43.59 MELANOMA OF BACK (HCC): ICD-10-CM

## 2022-11-17 NOTE — PATIENT INSTRUCTIONS
1  HISTORY OF MELANOMA    The patient denies fevers, chills, night sweats, weight loss, headache, visual change, bone pain, paraesthesias, cough, SOB, abdominal pain, n/v/d, and is feeling at their baseline health  They are UTD on their dental and eye exams  Assessment and Plan:  Based on a thorough discussion of this condition and the management approach to it (including a comprehensive discussion of the known risks, side effects and potential benefits of treatment), the patient (family) agrees to implement the following specific plan:  Continue skin examination   Please continue eye exams, dental exams, annual physicals, and ob-gyn exams    What happens at follow-up? The main purpose of follow-up is to detect recurrences early (metastatic melanoma), but it also offers an opportunity to diagnose a new primary melanoma at the first possible opportunity  A second invasive melanoma occurs in 5-10% of melanoma patients and a new melanoma in situ is diagnosed in more than 20% of melanoma patients  Our practice makes the following recommendations for follow-up for patients with invasive melanoma    At-least "monthly" self-skin examinations   Routine skin checks by a board certified dermatologist  Follow-up intervals are "every 3 months" within 2 years of a new melanoma diagnosis; "every 6 months" between 2-4 years of a new melanoma diagnosis; and "annually" after 4 years of a new melanoma diagnosis  Individual patient's needs should be considered before an appropriate follow-up is offered  Provide education and support to help the patient adjust to their illness    Follow-up appointments should include:  A check of the scar where the primary melanoma was removed  Checking the regional lymph nodes  A general skin examination  A full physical examination at least annually by your primary care physician    In those with more advanced primary disease, follow-up may include:  Blood tests  Imaging: ultrasound, X-ray, CT, MRI and PET scan  Most tests are not worthwhile for patients with stage 1 or 2 melanoma unless there are signs or symptoms of disease recurrence or metastasis  No tests are necessary for healthy patients who have remained well for five years or longer after removal of their melanoma  What is the outlook for patients with melanoma? Melanoma in situ is cured by excision because it has no potential to spread around the body  The risk of spread and ultimate death from invasive melanoma depends on several factors, but the main one is the Breslow thickness of the melanoma at the time it was surgically removed  Metastases are rare for melanomas < 0 75 mm and the risk for tumours 0 75-1 mm thick is about 5%  The risk steadily increases with thickness so that melanomas > 4 mm have a risk of metastasis of about 40%  Melanoma is a potentially serious type of skin cancer, in which there is uncontrolled growth of melanocytes (pigment cells)  Melanoma is sometimes called malignant melanoma  Normal melanocytes are found in the basal layer of the epidermis (the outer layer of skin)  Melanocytes produce a protein called melanin, which protects skin cells by absorbing ultraviolet (UV) radiation  Melanocytes are found in equal numbers in black and white skin, but melanocytes in black skin produce much more melanin  People with dark brown or black skin are very much less likely to be damaged by UV radiation than those with white skin  2  NEOPLASM OF UNCERTAIN BEHAVIOR OF SKIN    Assessment and Plan:  I have discussed with the patient that a sample of skin via a "skin biopsy” would be potentially helpful to further make a specific diagnosis under the microscope    Based on a thorough discussion of this condition and the management approach to it (including a comprehensive discussion of the known risks, side effects and potential benefits of treatment), the patient (family) agrees to implement the following specific plan:    Procedure:  Skin Biopsy  After a thorough discussion of treatment options and risk/benefits/alternatives (including but not limited to local pain, scarring, dyspigmentation, blistering, possible superinfection, and inability to confirm a diagnosis via histopathology), verbal and written consent were obtained and portion of the rash was biopsied for tissue sample  See below for consent that was obtained from patient and subsequent Procedure Note  PROCEDURE TANGENTIAL (SHAVE) BIOPSY NOTE:      INFORMED CONSENT DISCUSSION AND POST-OPERATIVE INSTRUCTIONS FOR PATIENT    I   RATIONALE FOR PROCEDURE  I understand that a skin biopsy allows the Dermatologist to test a lesion or rash under the microscope to obtain a diagnosis  It usually involves numbing the area with numbing medication and removing a small piece of skin; sometimes the area will be closed with sutures  In this specific procedure, sutures are not usually needed  If any sutures are placed, then they are usually need to be removed in 2 weeks or less  I understand that my Dermatologist recommends that a skin "shave" biopsy be performed today  A local anesthetic, similar to the kind that a dentist uses when filling a cavity, will be injected with a very small needle into the skin area to be sampled  The injected skin and tissue underneath "will go to sleep” and become numb so no pain should be felt afterwards  An instrument shaped like a tiny "razor blade" (shave biopsy instrument) will be used to cut a small piece of tissue and skin from the area so that a sample of tissue can be taken and examined more closely under the microscope  A slight amount of bleeding will occur, but it will be stopped with direct pressure and a pressure bandage and any other appropriate methods  I understands that a scar will form where the wound was created  Surgical ointment will be applied to help protect the wound  Sutures are not usually needed      II  RISKS AND POTENTIAL COMPLICATIONS   I understand the risks and potential complications of a skin biopsy include but are not limited to the following:  Bleeding  Infection  Pain  Scar/keloid  Skin discoloration  Incomplete Removal  Recurrence  Nerve Damage/Numbness/Loss of Function  Allergic Reaction to Anesthesia  Biopsies are diagnostic procedures and based on findings additional treatment or evaluation may be required  Loss or destruction of specimen resulting in no additional findings    My Dermatologist has explained to me the nature of the condition, the nature of the procedure, and the benefits to be reasonably expected compared with alternative approaches  My Dermatologist has discussed the likelihood of major risks or complications of this procedure including the specific risks listed above, such as bleeding, infection, and scarring/keloid  I understand that a scar is expected after this procedure  I understand that my physician cannot predict if the scar will form a "keloid," which extends beyond the borders of the wound that is created  A keloid is a thick, painful, and bumpy scar  A keloid can be difficult to treat, as it does not always respond well to therapy, which includes injecting cortisone directly into the keloid every few weeks  While this usually reduces the pain and size of the scar, it does not eliminate it  I understand that photographs may be taken before and after the procedure  These will be maintained as part of the medical providers confidential records and may not be made available to me  I further authorize the medical provider to use the photographs for teaching purposes or to illustrate scientific papers, books, or lectures if in his/her judgment, medical research, education, or science may benefit from its use  I have had an opportunity to fully inquire about the risks and benefits of this procedure and its alternatives     I have been given ample time and opportunity to ask questions and to seek a second opinion if I wished to do so  I acknowledge that there have specifically been no guarantees as to the cosmetic results from the procedure  I am aware that with any procedure there is always the possibility of an unexpected complication  III  POST-PROCEDURAL CARE (WHAT YOU WILL NEED TO DO "AFTER THE BIOPSY" TO OPTIMIZE HEALING)    Keep the area clean and dry  Try NOT to remove the bandage or get it wet for the first 24 hours  Gently clean the area and apply surgical ointment (such as Vaseline petrolatum ointment, which is available "over the counter" and not a prescription) to the biopsy site for up to 2 weeks straight  This acts to protect the wound from the outside world  Sutures are not usually placed in this procedure  If any sutures were placed, return for suture removal as instructed (generally 1 week for the face, 2 weeks for the body)  Take Acetaminophen (Tylenol) for discomfort, if no contraindications  Ibuprofen or aspirin could make bleeding worse  Call our office immediately for signs of infection: fever, chills, increased redness, warmth, tenderness, discomfort/pain, or pus or foul smell coming from the wound  WHAT TO DO IF THERE IS ANY BLEEDING? If a small amount of bleeding is noticed, place a clean cloth over the area and apply firm pressure for ten minutes  Check the wound after 10 minutes of direct pressure  If bleeding persists, try one more time for an additional 10 minutes of direct pressure on the area  If the bleeding becomes heavier or does not stop after the second attempt, or if you have any other questions about this procedure, then please call your [facility] by calling [phone] (SKIN)  I hereby acknowledge that I have reviewed and verified the site with my Dermatologist and have requested and authorized my Dermatologist to proceed with the procedure        3  MELANOCYTIC NEVI ("Moles")    Assessment and Plan:  Based on a thorough discussion of this condition and the management approach to it (including a comprehensive discussion of the known risks, side effects and potential benefits of treatment), the patient (family) agrees to implement the following specific plan:  The patient was encouraged to use an SPF30+ broad spectrum sunscreen daily and re-apply every 2-3 outdoors while outside  The importance of sun protection, self-skin exams, and sun avoidance was emphasized  An annual full body skin exam is recommended, and the patient was encouraged to return to the office sooner for any new or changing lesions of concerns  Benign, reassured  Annual skin check     Melanocytic Nevi  Melanocytic nevi ("moles") are tan or brown, raised or flat areas of the skin which have an increased number of melanocytes  Melanocytes are the cells in our body which make pigment and account for skin color  Some moles are present at birth (I e , "congenital nevi"), while others come up later in life (i e , "acquired nevi")  The sun can stimulate the body to make more moles  Sunburns are not the only thing that triggers more moles  Chronic sun exposure can do it too  Clinically distinguishing a healthy mole from melanoma may be difficult, even for experienced dermatologists  The "ABCDE's" of moles have been suggested as a means of helping to alert a person to a suspicious mole and the possible increased risk of melanoma  The suggestions for raising alert are as follows:    Asymmetry: Healthy moles tend to be symmetric, while melanomas are often asymmetric  Asymmetry means if you draw a line through the mole, the two halves do not match in color, size, shape, or surface texture  Asymmetry can be a result of rapid enlargement of a mole, the development of a raised area on a previously flat lesion, scaling, ulceration, bleeding or scabbing within the mole    Any mole that starts to demonstrate "asymmetry" should be examined promptly by a board certified dermatologist      Vannesa Marinelli: Healthy moles tend to have discrete, even borders  The border of a melanoma often blends into the normal skin and does not sharply delineate the mole from normal skin  Any mole that starts to demonstrate "uneven borders" should be examined promptly by a board certified dermatologist      Color: Healthy moles tend to be one color throughout  Melanomas tend to be made up of different colors ranging from dark black, blue, white, or red  Any mole that demonstrates a color change should be examined promptly by a board certified dermatologist      Diameter: Healthy moles tend to be smaller than 0 6 cm in size; an exception are "congenital nevi" that can be larger  Melanomas tend to grow and can often be greater than 0 6 cm (1/4 of an inch, or the size of a pencil eraser)  This is only a guideline, and many normal moles may be larger than 0 6 cm without being unhealthy  Any mole that starts to change in size (small to bigger or bigger to smaller) should be examined promptly by a board certified dermatologist      Evolving: Healthy moles tend to "stay the same "  Melanomas may often show signs of change or evolution such as a change in size, shape, color, or elevation    Any mole that starts to itch, bleed, crust, burn, hurt, or ulcerate or demonstrate a change or evolution should be examined promptly by a board certified dermatologist

## 2022-11-17 NOTE — PROGRESS NOTES
[facility] Note     Patient Name: Sagar Peres  Encounter Date: 11/17/2022     Have you been cared for by a [place] [id number] Dermatologist in the last 3 years and, if so, which description applies to you? NO  I am considered a "new" patient and must complete all patient intake questions  I am FEMALE/of child-bearing potential     REVIEW OF SYSTEMS:  Have you recently had or currently have any of the following? · Recent fever or chills? No  · Any non-healing wound? No  · Are you pregnant or planning to become pregnant? No  · Are you currently or planning to be nursing or breast feeding? No   PAST MEDICAL HISTORY:  Have you personally ever had or currently have any of the following? If "YES," then please provide more detail  · Skin cancer (such as Melanoma, Basal Cell Carcinoma, Squamous Cell Carcinoma? YES, Melanoma   · Tuberculosis, HIV/AIDS, Hepatitis B or C: No  · Systemic Immunosuppression such as Diabetes, Biologic or Immunotherapy, Chemotherapy, Organ Transplantation, Bone Marrow Transplantation No  · Radiation Treatment No   FAMILY HISTORY:  Any "first degree relatives" (parent, brother, sister, or child) with the following? • Skin Cancer, Pancreatic or Other Cancer? YES, Melanoma (aunt and uncle), breast cancer, bone cancer, and brain cancer    PATIENT EXPERIENCE:    • Do you want the Dermatologist to perform a COMPLETE skin exam today including a clinical examination under the "bra and underwear" areas? Yes  • If necessary, do we have your permission to call and leave a detailed message on your Preferred Phone number that includes your specific medical information?   Yes      Allergies   Allergen Reactions   • Zithromax [Azithromycin] Nausea Only      Current Outpatient Medications:   •  albuterol (PROVENTIL HFA,VENTOLIN HFA) 90 mcg/act inhaler, Inhale 2 puffs every 6 (six) hours as needed for wheezing, Disp: , Rfl:   •  budesonide-formoterol (SYMBICORT) 80-4 5 MCG/ACT inhaler, Inhale 2 puffs 2 (two) times a day Rinse mouth after use , Disp: , Rfl:   •  cholecalciferol (VITAMIN D3) 1,000 units tablet, Take 1,000 Units by mouth daily, Disp: , Rfl:   •  cyclobenzaprine (FLEXERIL) 5 mg tablet, Take 5 mg by mouth 3 (three) times a day as needed for muscle spasms, Disp: , Rfl:   •  ferrous sulfate 325 (65 Fe) mg tablet, Take 325 mg by mouth daily with breakfast, Disp: , Rfl:   •  gabapentin (NEURONTIN) 300 mg capsule, Take 300 mg by mouth daily at bedtime, Disp: , Rfl:   •  omeprazole (PriLOSEC) 20 mg delayed release capsule, Take 20 mg by mouth daily, Disp: , Rfl:   •  ondansetron (ZOFRAN) 4 mg tablet, Take 4 mg by mouth every 8 (eight) hours as needed for nausea or vomiting, Disp: , Rfl:   •  pantoprazole (PROTONIX) 40 mg tablet, Take 40 mg by mouth daily, Disp: , Rfl:   •  traMADol (Ultram) 50 mg tablet, Take 1 tablet (50 mg total) by mouth every 6 (six) hours as needed for moderate pain (Patient not taking: Reported on 10/20/2022), Disp: 10 tablet, Rfl: 0          • Whom besides the patient is providing clinical information about today's encounter?   o NO ADDITIONAL HISTORIAN (patient alone provided history)    Physical Exam and Assessment/Plan by Diagnosis:      1  HISTORY OF MELANOMA    The patient denies fevers, chills, night sweats, weight loss, headache, visual change, bone pain, paraesthesias, cough, SOB, abdominal pain, n/v/d, and is feeling at their baseline health   They are UTD on their dental and GYN exams but due for visual exam     Physical Exam:  • Anatomic Location Affected:  Back   • Morphological Description of Scar:  Well healed scar, NER  • Year Treated: 2022  • TNM Classification: 0 4 mm/T1  • Suspected Recurrence: no  • Regional adenopathy: no    Additional History of Present Condition:  N/A    Assessment and Plan:  Based on a thorough discussion of this condition and the management approach to it (including a comprehensive discussion of the known risks, side effects and potential benefits of treatment), the patient (family) agrees to implement the following specific plan:  • Continue skin examination   • Please continue eye exams, dental exams, annual physicals, and ob-gyn exams    What happens at follow-up? The main purpose of follow-up is to detect recurrences early (metastatic melanoma), but it also offers an opportunity to diagnose a new primary melanoma at the first possible opportunity  A second invasive melanoma occurs in 5-10% of melanoma patients and a new melanoma in situ is diagnosed in more than 20% of melanoma patients  Our practice makes the following recommendations for follow-up for patients with invasive melanoma  • At-least "monthly" self-skin examinations   • Routine skin checks by a board certified dermatologist  • Follow-up intervals are "every 3 months" within 2 years of a new melanoma diagnosis; "every 6 months" between 2-4 years of a new melanoma diagnosis; and "annually" after 4 years of a new melanoma diagnosis  • Individual patient's needs should be considered before an appropriate follow-up is offered  • Provide education and support to help the patient adjust to their illness    Follow-up appointments should include:  • A check of the scar where the primary melanoma was removed  • Checking the regional lymph nodes  • A general skin examination  • A full physical examination at least annually by your primary care physician    In those with more advanced primary disease, follow-up may include:  • Blood tests  • Imaging: ultrasound, X-ray, CT, MRI and PET scan  Most tests are not worthwhile for patients with stage 1 or 2 melanoma unless there are signs or symptoms of disease recurrence or metastasis  No tests are necessary for healthy patients who have remained well for five years or longer after removal of their melanoma  What is the outlook for patients with melanoma?   • Melanoma in situ is cured by excision because it has no potential to spread around the body  • The risk of spread and ultimate death from invasive melanoma depends on several factors, but the main one is the Breslow thickness of the melanoma at the time it was surgically removed  • Metastases are rare for melanomas < 0 75 mm and the risk for tumours 0 75-1 mm thick is about 5%  The risk steadily increases with thickness so that melanomas > 4 mm have a risk of metastasis of about 40%  Melanoma is a potentially serious type of skin cancer, in which there is uncontrolled growth of melanocytes (pigment cells)  Melanoma is sometimes called malignant melanoma  Normal melanocytes are found in the basal layer of the epidermis (the outer layer of skin)  Melanocytes produce a protein called melanin, which protects skin cells by absorbing ultraviolet (UV) radiation  Melanocytes are found in equal numbers in black and white skin, but melanocytes in black skin produce much more melanin  People with dark brown or black skin are very much less likely to be damaged by UV radiation than those with white skin  2  NEOPLASM OF UNCERTAIN BEHAVIOR OF SKIN    Physical Exam:  • (Anatomic Location); (Size and Morphological Description); (Differential Diagnosis):  o Specimen A: Right knee; 2 mm brown papule; differential diagnosis; benign nevus vs atypical nevus vs rule out melanoma   o Specimen B: Left Medial Ankle; 2 mm brown papule; differential diagnosis; benign nevus vs atypical nevus vs dermatofibroma; rule out melanoma     Specimen C: Left Abdomen; 4 mm brown papule; differential diagnosis; benign nevus vs atypical   nevus vs rule out melanoma   • Pertinent Positives:  • Pertinent Negatives: Additional History of Present Condition:  N/A    Assessment and Plan:  • I have discussed with the patient that a sample of skin via a "skin biopsy” would be potentially helpful to further make a specific diagnosis under the microscope    • Based on a thorough discussion of this condition and the management approach to it (including a comprehensive discussion of the known risks, side effects and potential benefits of treatment), the patient (family) agrees to implement the following specific plan:    o Procedure:  Skin Biopsy  After a thorough discussion of treatment options and risk/benefits/alternatives (including but not limited to local pain, scarring, dyspigmentation, blistering, possible superinfection, and inability to confirm a diagnosis via histopathology), verbal and written consent were obtained and portion of the rash was biopsied for tissue sample  See below for consent that was obtained from patient and subsequent Procedure Note  PROCEDURE TANGENTIAL (SHAVE) BIOPSY NOTE:    • Performing Physician: Pinky Verma  • Anatomic Location; Clinical Description with size (cm); Pre-Op Diagnosis:   Specimen A: Right knee; 2 mm brown papule; differential diagnosis; benign nevus vs atypical nevus vs rule out melanoma   • Post-op diagnosis: Same     • Local anesthesia: 1% Lidocaine HCL     • Topical anesthesia: None    • Hemostasis: Aluminum chloride       After obtaining informed consent  at which time there was a discussion about the purpose of biopsy  and low risks of infection and bleeding  The area was prepped and draped in the usual fashion  Anesthesia was obtained with 1% lidocaine with epinephrine  A shave biopsy to an appropriate sampling depth was obtained by Shave (Dermablade or 15 blade) The resulting wound was covered with surgical ointment and bandaged appropriately  The patient tolerated the procedure well without complications and was without signs of functional compromise  Specimen has been sent for review by Dermatopathology  Standard post-procedure care has been explained and has been included in written form within the patient's copy of Informed Consent      PROCEDURE TANGENTIAL (SHAVE) BIOPSY NOTE:    • Performing Physician: Pinky Verma  • Anatomic Location; Clinical Description with size (cm); Pre-Op Diagnosis:   Specimen B: Left Medial Ankle; 2 mm brown papule; differential diagnosis; benign nevus vs atypical nevus vs rule out melanoma   • Post-op diagnosis: Same     • Local anesthesia: 1% Lidocaine HCL     • Topical anesthesia: None    • Hemostasis: Aluminum chloride       After obtaining informed consent  at which time there was a discussion about the purpose of biopsy  and low risks of infection and bleeding  The area was prepped and draped in the usual fashion  Anesthesia was obtained with 1% lidocaine with epinephrine  A shave biopsy to an appropriate sampling depth was obtained by Shave (Dermablade or 15 blade) The resulting wound was covered with surgical ointment and bandaged appropriately  The patient tolerated the procedure well without complications and was without signs of functional compromise  Specimen has been sent for review by Dermatopathology  Standard post-procedure care has been explained and has been included in written form within the patient's copy of Informed Consent  PROCEDURE TANGENTIAL (SHAVE) BIOPSY NOTE:    • Performing Physician: Alondra Mendoza  • Anatomic Location; Clinical Description with size (cm); Pre-Op Diagnosis:   Specimen C: Left Abdomen; 4 mm brown papule; differential diagnosis; benign nevus vs atypical   nevus vs rule out melanoma   • Post-op diagnosis: Same     • Local anesthesia: 1% Lidocaine HCL     • Topical anesthesia: None    • Hemostasis: Aluminum chloride       After obtaining informed consent  at which time there was a discussion about the purpose of biopsy  and low risks of infection and bleeding  The area was prepped and draped in the usual fashion  Anesthesia was obtained with 1% lidocaine with epinephrine  A shave biopsy to an appropriate sampling depth was obtained by Shave (Dermablade or 15 blade) The resulting wound was covered with surgical ointment and bandaged appropriately       The patient tolerated the procedure well without complications and was without signs of functional compromise  Specimen has been sent for review by Dermatopathology  Standard post-procedure care has been explained and has been included in written form within the patient's copy of Informed Consent  INFORMED CONSENT DISCUSSION AND POST-OPERATIVE INSTRUCTIONS FOR PATIENT    I   RATIONALE FOR PROCEDURE  I understand that a skin biopsy allows the Dermatologist to test a lesion or rash under the microscope to obtain a diagnosis  It usually involves numbing the area with numbing medication and removing a small piece of skin; sometimes the area will be closed with sutures  In this specific procedure, sutures are not usually needed  If any sutures are placed, then they are usually need to be removed in 2 weeks or less  I understand that my Dermatologist recommends that a skin "shave" biopsy be performed today  A local anesthetic, similar to the kind that a dentist uses when filling a cavity, will be injected with a very small needle into the skin area to be sampled  The injected skin and tissue underneath "will go to sleep” and become numb so no pain should be felt afterwards  An instrument shaped like a tiny "razor blade" (shave biopsy instrument) will be used to cut a small piece of tissue and skin from the area so that a sample of tissue can be taken and examined more closely under the microscope  A slight amount of bleeding will occur, but it will be stopped with direct pressure and a pressure bandage and any other appropriate methods  I understands that a scar will form where the wound was created  Surgical ointment will be applied to help protect the wound  Sutures are not usually needed      II   RISKS AND POTENTIAL COMPLICATIONS   I understand the risks and potential complications of a skin biopsy include but are not limited to the following:  • Bleeding  • Infection  • Pain  • Scar/keloid  • Skin discoloration  • Incomplete Removal  • Recurrence  • Nerve Damage/Numbness/Loss of Function  • Allergic Reaction to Anesthesia  • Biopsies are diagnostic procedures and based on findings additional treatment or evaluation may be required  • Loss or destruction of specimen resulting in no additional findings    My Dermatologist has explained to me the nature of the condition, the nature of the procedure, and the benefits to be reasonably expected compared with alternative approaches  My Dermatologist has discussed the likelihood of major risks or complications of this procedure including the specific risks listed above, such as bleeding, infection, and scarring/keloid  I understand that a scar is expected after this procedure  I understand that my physician cannot predict if the scar will form a "keloid," which extends beyond the borders of the wound that is created  A keloid is a thick, painful, and bumpy scar  A keloid can be difficult to treat, as it does not always respond well to therapy, which includes injecting cortisone directly into the keloid every few weeks  While this usually reduces the pain and size of the scar, it does not eliminate it  I understand that photographs may be taken before and after the procedure  These will be maintained as part of the medical providers confidential records and may not be made available to me  I further authorize the medical provider to use the photographs for teaching purposes or to illustrate scientific papers, books, or lectures if in his/her judgment, medical research, education, or science may benefit from its use  I have had an opportunity to fully inquire about the risks and benefits of this procedure and its alternatives  I have been given ample time and opportunity to ask questions and to seek a second opinion if I wished to do so  I acknowledge that there have specifically been no guarantees as to the cosmetic results from the procedure    I am aware that with any procedure there is always the possibility of an unexpected complication  III  POST-PROCEDURAL CARE (WHAT YOU WILL NEED TO DO "AFTER THE BIOPSY" TO OPTIMIZE HEALING)    • Keep the area clean and dry  Try NOT to remove the bandage or get it wet for the first 24 hours  • Gently clean the area and apply surgical ointment (such as Vaseline petrolatum ointment, which is available "over the counter" and not a prescription) to the biopsy site for up to 2 weeks straight  This acts to protect the wound from the outside world  • Sutures are not usually placed in this procedure  If any sutures were placed, return for suture removal as instructed (generally 1 week for the face, 2 weeks for the body)  • Take Acetaminophen (Tylenol) for discomfort, if no contraindications  Ibuprofen or aspirin could make bleeding worse  • Call our office immediately for signs of infection: fever, chills, increased redness, warmth, tenderness, discomfort/pain, or pus or foul smell coming from the wound  WHAT TO DO IF THERE IS ANY BLEEDING? If a small amount of bleeding is noticed, place a clean cloth over the area and apply firm pressure for ten minutes  Check the wound after 10 minutes of direct pressure  If bleeding persists, try one more time for an additional 10 minutes of direct pressure on the area  If the bleeding becomes heavier or does not stop after the second attempt, or if you have any other questions about this procedure, then please call your [facility]atologist by calling [phone] (SKIN)  I hereby acknowledge that I have reviewed and verified the site with my Dermatologist and have requested and authorized my Dermatologist to proceed with the procedure        3  MELANOCYTIC NEVI ("Moles")    Physical Exam:  • Anatomic Location Affected:   Mostly on sun-exposed areas of the trunk and extremities  • Morphological Description:  Scattered, 1-4mm round to ovoid, symmetric and evenly bordered, regularly pigmented macules/papules without outliers other than if noted elsewhere in today's note  • Pertinent Positives:  • Pertinent Negatives: Additional History of Present Condition:      Assessment and Plan:  Based on a thorough discussion of this condition and the management approach to it (including a comprehensive discussion of the known risks, side effects and potential benefits of treatment), the patient (family) agrees to implement the following specific plan:  • The patient was encouraged to use an SPF30+ broad spectrum sunscreen daily and re-apply every 2-3 outdoors while outside  The importance of sun protection, self-skin exams, and sun avoidance was emphasized  An annual full body skin exam is recommended, and the patient was encouraged to return to the office sooner for any new or changing lesions of concerns  • Benign, reassured  • Annual skin check     Melanocytic Nevi  Melanocytic nevi ("moles") are tan or brown, raised or flat areas of the skin which have an increased number of melanocytes  Melanocytes are the cells in our body which make pigment and account for skin color  Some moles are present at birth (I e , "congenital nevi"), while others come up later in life (i e , "acquired nevi")  The sun can stimulate the body to make more moles  Sunburns are not the only thing that triggers more moles  Chronic sun exposure can do it too  Clinically distinguishing a healthy mole from melanoma may be difficult, even for experienced dermatologists  The "ABCDE's" of moles have been suggested as a means of helping to alert a person to a suspicious mole and the possible increased risk of melanoma  The suggestions for raising alert are as follows:    Asymmetry: Healthy moles tend to be symmetric, while melanomas are often asymmetric  Asymmetry means if you draw a line through the mole, the two halves do not match in color, size, shape, or surface texture   Asymmetry can be a result of rapid enlargement of a mole, the development of a raised area on a previously flat lesion, scaling, ulceration, bleeding or scabbing within the mole  Any mole that starts to demonstrate "asymmetry" should be examined promptly by a board certified dermatologist      Border: Healthy moles tend to have discrete, even borders  The border of a melanoma often blends into the normal skin and does not sharply delineate the mole from normal skin  Any mole that starts to demonstrate "uneven borders" should be examined promptly by a board certified dermatologist      Color: Healthy moles tend to be one color throughout  Melanomas tend to be made up of different colors ranging from dark black, blue, white, or red  Any mole that demonstrates a color change should be examined promptly by a board certified dermatologist      Diameter: Healthy moles tend to be smaller than 0 6 cm in size; an exception are "congenital nevi" that can be larger  Melanomas tend to grow and can often be greater than 0 6 cm (1/4 of an inch, or the size of a pencil eraser)  This is only a guideline, and many normal moles may be larger than 0 6 cm without being unhealthy  Any mole that starts to change in size (small to bigger or bigger to smaller) should be examined promptly by a board certified dermatologist      Evolving: Healthy moles tend to "stay the same "  Melanomas may often show signs of change or evolution such as a change in size, shape, color, or elevation    Any mole that starts to itch, bleed, crust, burn, hurt, or ulcerate or demonstrate a change or evolution should be examined promptly by a board certified dermatologist                                     Lois Burk,:  Aide Olsen am acting as a scribe while in the presence of the attending physician :       I,:  Marco Antonio Kothari MD personally performed the services described in this documentation    as scribed in my presence :

## 2022-11-21 ENCOUNTER — PATIENT MESSAGE (OUTPATIENT)
Dept: DERMATOLOGY | Facility: CLINIC | Age: 42
End: 2022-11-21

## 2022-11-21 DIAGNOSIS — L08.9 WOUND INFECTION: Primary | ICD-10-CM

## 2022-11-21 DIAGNOSIS — T14.8XXA WOUND INFECTION: Primary | ICD-10-CM

## 2022-11-22 ENCOUNTER — DOCUMENTATION (OUTPATIENT)
Dept: HEMATOLOGY ONCOLOGY | Facility: CLINIC | Age: 42
End: 2022-11-22

## 2022-12-17 ENCOUNTER — OFFICE VISIT (OUTPATIENT)
Dept: URGENT CARE | Facility: CLINIC | Age: 42
End: 2022-12-17

## 2022-12-17 VITALS
BODY MASS INDEX: 26.36 KG/M2 | HEIGHT: 66 IN | TEMPERATURE: 99 F | WEIGHT: 164 LBS | OXYGEN SATURATION: 99 % | HEART RATE: 101 BPM | RESPIRATION RATE: 18 BRPM

## 2022-12-17 DIAGNOSIS — R05.1 ACUTE COUGH: Primary | ICD-10-CM

## 2022-12-17 DIAGNOSIS — R11.0 NAUSEA: ICD-10-CM

## 2022-12-17 LAB
SARS-COV-2 AG UPPER RESP QL IA: NEGATIVE
VALID CONTROL: NORMAL

## 2022-12-17 RX ORDER — PREDNISONE 10 MG/1
TABLET ORAL
Qty: 21 TABLET | Refills: 0 | Status: SHIPPED | OUTPATIENT
Start: 2022-12-17

## 2022-12-17 RX ORDER — ONDANSETRON 4 MG/1
4 TABLET, ORALLY DISINTEGRATING ORAL EVERY 6 HOURS PRN
Qty: 20 TABLET | Refills: 0 | Status: SHIPPED | OUTPATIENT
Start: 2022-12-17

## 2022-12-17 NOTE — LETTER
December 17, 2022     Patient: Celia Keating   YOB: 1980   Date of Visit: 12/17/2022       To Whom it May Concern:    Celia Keating was seen in my clinic on 12/17/2022  Please excuse from work on 12/18/2022 and 12/19/2022  If you have any questions or concerns, please don't hesitate to call           Sincerely,          ALEXANDRA Bocanegra        CC: No Recipients

## 2022-12-17 NOTE — PROGRESS NOTES
[address] [facility]        NAME: Sagar Peres is a 38 y o  female  : 1980    MRN: [id number]  DATE: 2022  TIME: 11:54 AM    Assessment and Plan   Acute cough [R05 1]  1  Acute cough  Poct Covid 19 Rapid Antigen Test    predniSONE 10 mg tablet      2  Nausea  ondansetron (ZOFRAN-ODT) 4 mg disintegrating tablet            Patient Instructions       Follow up with PCP in 3-5 days  Proceed to  ER if symptoms worsen  Chief Complaint     Chief Complaint   Patient presents with   • Cold Like Symptoms     X5 days: moist prod cough (yellow), runny nose, post nasal drip, headaches, fevers, chills, decreased appetite but pushing fluids, fatigue, body aches, sleeping more than normal, & also N/V/D  Hx of asthma  No Covid home tests  Did not have flu shot  History of Present Illness       Fever  This is a new problem  The current episode started in the past 7 days  The problem occurs intermittently  The problem has been unchanged  Associated symptoms include arthralgias, chills, congestion, coughing, fatigue, a fever, headaches, nausea, a sore throat and vomiting  Pertinent negatives include no abdominal pain or chest pain  Associated symptoms comments: diarrhea  She has tried acetaminophen and NSAIDs (OTC cough medicine) for the symptoms  The treatment provided no relief  Review of Systems   Review of Systems   Constitutional: Positive for activity change, appetite change, chills, fatigue and fever  HENT: Positive for congestion, postnasal drip, rhinorrhea and sore throat  Respiratory: Positive for cough and chest tightness  Negative for shortness of breath and wheezing  Cardiovascular: Negative for chest pain and palpitations  Gastrointestinal: Positive for nausea and vomiting  Negative for abdominal pain  Musculoskeletal: Positive for arthralgias  Neurological: Positive for headaches  All other systems reviewed and are negative          Current Medications Current Outpatient Medications:   •  albuterol (PROVENTIL HFA,VENTOLIN HFA) 90 mcg/act inhaler, Inhale 2 puffs every 6 (six) hours as needed for wheezing, Disp: , Rfl:   •  budesonide-formoterol (SYMBICORT) 80-4 5 MCG/ACT inhaler, Inhale 2 puffs 2 (two) times a day Rinse mouth after use , Disp: , Rfl:   •  cholecalciferol (VITAMIN D3) 1,000 units tablet, Take 1,000 Units by mouth daily, Disp: , Rfl:   •  cyclobenzaprine (FLEXERIL) 5 mg tablet, Take 5 mg by mouth 3 (three) times a day as needed for muscle spasms, Disp: , Rfl:   •  ferrous sulfate 325 (65 Fe) mg tablet, Take 325 mg by mouth daily with breakfast, Disp: , Rfl:   •  gabapentin (NEURONTIN) 300 mg capsule, Take 300 mg by mouth daily at bedtime, Disp: , Rfl:   •  omeprazole (PriLOSEC) 20 mg delayed release capsule, Take 20 mg by mouth daily, Disp: , Rfl:   •  ondansetron (ZOFRAN-ODT) 4 mg disintegrating tablet, Take 1 tablet (4 mg total) by mouth every 6 (six) hours as needed for nausea or vomiting, Disp: 20 tablet, Rfl: 0  •  predniSONE 10 mg tablet, Take 6 pills day 1, then 5 pills day 2, 4 pills day 3, 3 pills day 4, 2 pills day 5, 1 pill day 6,, Disp: 21 tablet, Rfl: 0  •  mupirocin (BACTROBAN) 2 % ointment, Apply topically 3 (three) times a day (Patient not taking: Reported on 12/17/2022), Disp: 22 g, Rfl: 0  •  ondansetron (ZOFRAN) 4 mg tablet, Take 4 mg by mouth every 8 (eight) hours as needed for nausea or vomiting (Patient not taking: Reported on 12/17/2022), Disp: , Rfl:   •  pantoprazole (PROTONIX) 40 mg tablet, Take 40 mg by mouth daily (Patient not taking: Reported on 12/17/2022), Disp: , Rfl:   •  traMADol (Ultram) 50 mg tablet, Take 1 tablet (50 mg total) by mouth every 6 (six) hours as needed for moderate pain (Patient not taking: Reported on 10/20/2022), Disp: 10 tablet, Rfl: 0    Current Allergies     Allergies as of 12/17/2022 - Reviewed 12/17/2022   Allergen Reaction Noted   • Zithromax [azithromycin] Nausea Only 09/12/2022 The following portions of the patient's history were reviewed and updated as appropriate: allergies, current medications, past family history, past medical history, past social history, past surgical history and problem list      Past Medical History:   Diagnosis Date   • Arthritis    • Asthma    • Cancer ([facility] [address] )     melanoma   • COVID-19 08/2021    mild s/s-fully vaccinated   • History of transfusion    • Melanoma ([facility] [address] ) 8/24/22   • Pneumonia     as a child   • Shortness of breath     allergies trigger/on exersion   • Varicella        Past Surgical History:   Procedure Laterality Date   • APPENDECTOMY  2011   • CHOLECYSTECTOMY  2011   • ENDOMETRIAL ABLATION N/A 2021   • HERNIA REPAIR  [address]    umbilical hernia   • INTESTINAL MALROTATION REPAIR N/A 11/2015   • SKIN BIOPSY  8/11/22   • SKIN LESION EXCISION N/A 10/07/2022    Procedure: WIDE EXCISION OF UPPER BACK MELANOMA;  Surgeon: Javier Martinez MD;  Location: [facility];  Service: Surgical Oncology   • SPINAL FUSION N/A 11/2005    with Wayzata shira-       Family History   Problem Relation Age of Onset   • Hypertension Mother    • Diabetes Father    • Hypertension Father    • Cancer Maternal Grandfather         Lung cancer   • Diabetes Paternal Grandfather    • Cancer Maternal Aunt         Skin - melanoma   • Cancer Maternal Uncle         Skin - melanoma   • Cancer Maternal Uncle         Skin - melanoma         Medications have been verified  Objective   Pulse 101   Temp 99 °F (37 2 °C) (Oral)   Resp 18   Ht 5' 6" (1 676 m)   Wt 74 4 kg (164 lb)   SpO2 99%   BMI 26 47 kg/m²        Physical Exam     Physical Exam  Vitals and nursing note reviewed  Constitutional:       General: She is not in acute distress  Appearance: Normal appearance  She is normal weight  She is not ill-appearing or toxic-appearing  HENT:      Right Ear: Tympanic membrane normal       Left Ear: Tympanic membrane normal       Nose: Congestion and rhinorrhea present  Mouth/Throat:      Pharynx: Oropharynx is clear  Cardiovascular:      Rate and Rhythm: Normal rate and regular rhythm  Pulses: Normal pulses  Heart sounds: Normal heart sounds  Pulmonary:      Effort: Pulmonary effort is normal       Breath sounds: Normal breath sounds  Skin:     General: Skin is warm and dry  Capillary Refill: Capillary refill takes less than 2 seconds  Neurological:      General: No focal deficit present  Mental Status: She is alert and oriented to person, place, and time

## 2022-12-17 NOTE — PATIENT INSTRUCTIONS
Take the steroids with food  Use the zofran as needed for nausea  Use a warm mist humidifier or vaporizer  Hot tea with honey  Warm saline gargle or throat lozenge may help with a sore throat  OTC saline nasal sprays   Drink plenty of fluids

## 2023-03-11 ENCOUNTER — OFFICE VISIT (OUTPATIENT)
Dept: URGENT CARE | Facility: CLINIC | Age: 43
End: 2023-03-11

## 2023-03-11 VITALS
DIASTOLIC BLOOD PRESSURE: 74 MMHG | SYSTOLIC BLOOD PRESSURE: 118 MMHG | WEIGHT: 164 LBS | BODY MASS INDEX: 26.36 KG/M2 | OXYGEN SATURATION: 100 % | HEIGHT: 66 IN | HEART RATE: 100 BPM | RESPIRATION RATE: 18 BRPM | TEMPERATURE: 97.7 F

## 2023-03-11 DIAGNOSIS — R19.7 DIARRHEA, UNSPECIFIED TYPE: ICD-10-CM

## 2023-03-11 DIAGNOSIS — R11.0 NAUSEA: ICD-10-CM

## 2023-03-11 DIAGNOSIS — R10.33 PERIUMBILICAL ABDOMINAL PAIN: Primary | ICD-10-CM

## 2023-03-11 RX ORDER — ONDANSETRON HYDROCHLORIDE 8 MG/1
8 TABLET, FILM COATED ORAL EVERY 8 HOURS PRN
Qty: 20 TABLET | Refills: 0 | Status: SHIPPED | OUTPATIENT
Start: 2023-03-11

## 2023-03-11 RX ORDER — ONDANSETRON 4 MG/1
8 TABLET, ORALLY DISINTEGRATING ORAL ONCE
Status: COMPLETED | OUTPATIENT
Start: 2023-03-11 | End: 2023-03-11

## 2023-03-11 RX ADMIN — ONDANSETRON 8 MG: 4 TABLET, ORALLY DISINTEGRATING ORAL at 15:38

## 2023-03-11 NOTE — PATIENT INSTRUCTIONS
Take prescribed medications as instructed  Tylenol or ibuprofen for pain or fever  Make sure to stay well-hydrated with clear fluids and/or broths  May try the brat diet-bananas, rice, applesauce, toast-until normal appetite resumes  If you are still unable to tolerate food/fluids within the next 24 hours, go to the emergency room  If abdominal pain returns and becomes severe, go to the emergency room  If diarrhea does not improve within the next 24 hours, go to the emergency room  Follow-up with GI referral as discussed/given  Follow up with PCP in 3-5 days  Proceed to  ER if symptoms worsen  Abdominal Pain   WHAT YOU NEED TO KNOW:   Abdominal pain can be dull, achy, or sharp  You may have pain in one area of your abdomen, or in your entire abdomen  Your pain may be caused by a condition such as constipation, food sensitivity or poisoning, infection, or a blockage  Abdominal pain can also be from a hernia, appendicitis, or an ulcer  Liver, gallbladder, or kidney conditions can also cause abdominal pain  The cause of your abdominal pain may not be known  DISCHARGE INSTRUCTIONS:   Call your local emergency number (911 in the [address] [place] [address],[address]) if:   You have chest pain or shortness of breath  Return to the emergency department if:   You have pulsing pain in your upper abdomen or lower back that suddenly becomes constant  Your pain is in the right lower abdominal area and worsens with movement  You have a fever over 100 4°F (38°C) or shaking chills  You are vomiting and cannot keep food or liquids down  Your pain does not improve or gets worse over the next 8 to 12 hours  You see blood in your vomit or bowel movements, or they look black and tarry  Your skin or the whites of your eyes turn yellow  You are a woman and have a large amount of vaginal bleeding that is not your monthly period  Call your doctor if:   You have pain in your lower back      You are a man and have pain in your testicles  You have pain when you urinate  You have questions or concerns about your condition or care  Medicines: You may need any of the following:  Medicines  may be given to calm your stomach or prevent vomiting  Prescription pain medicine  may be given  Ask your healthcare provider how to take this medicine safely  Some prescription pain medicines contain acetaminophen  Do not take other medicines that contain acetaminophen without talking to your healthcare provider  Too much acetaminophen may cause liver damage  Prescription pain medicine may cause constipation  Ask your healthcare provider how to prevent or treat constipation  Take your medicine as directed  Contact your healthcare provider if you think your medicine is not helping or if you have side effects  Tell your provider if you are allergic to any medicine  Keep a list of the medicines, vitamins, and herbs you take  Include the amounts, and when and why you take them  Bring the list or the pill bottles to follow-up visits  Carry your medicine list with you in case of an emergency  Manage or prevent abdominal pain:   Apply heat  on your abdomen for 20 to 30 minutes every 2 hours for as many days as directed  Heat helps decrease pain and muscle spasms  Make changes to the foods you eat, if needed  Do not eat foods that cause abdominal pain or other symptoms  Eat small meals more often  The following changes may also help:    Eat more high-fiber foods if you are constipated  High-fiber foods include fruits, vegetables, whole-grain foods, and legumes such as velazquez beans  Do not eat foods that cause gas if you have bloating  Examples include broccoli, cabbage, beans, and carbonated drinks  Do not eat foods or drinks that contain sorbitol or fructose if you have diarrhea and bloating  Some examples are fruit juices, candy, jelly, and sugar-free gum  Do not eat high-fat foods    Examples include fried foods, cheeseburgers, hot dogs, and desserts  Make changes to the liquids you drink, if needed  Do not drink liquids that cause pain or make it worse, such as orange juice  Drink liquids throughout the day to stay hydrated  The following changes may also help:    Drink more liquids to prevent dehydration from diarrhea or vomiting  Ask your healthcare provider how much liquid to drink each day and which liquids are best for you  Limit or do not have caffeine  Caffeine may make symptoms such as heartburn or nausea worse  Limit or do not drink alcohol  Alcohol can make your abdominal pain worse  Ask your healthcare provider if it is okay for you to drink alcohol  Also ask how much is okay for you to drink  A drink of alcohol is 12 ounces of beer, ½ ounce of liquor, or 5 ounces of wine  Keep a diary of your abdominal pain  A diary may help your healthcare provider learn what is causing your pain  Include when the pain happens, how long it lasts, and what the pain feels like  Write down any other symptoms you have with abdominal pain  Also write down what you eat, and any symptoms you have after you eat  Manage stress  Stress may cause abdominal pain  Your healthcare provider may recommend relaxation techniques and deep breathing exercises to help decrease your stress  Your healthcare provider may recommend you talk to someone about your stress or anxiety, such as a counselor or a friend  Get plenty of sleep  Exercise regularly  Do not smoke  Nicotine and other chemicals in cigarettes can damage your esophagus and stomach  Ask your healthcare provider for information if you currently smoke and need help to quit  E-cigarettes or smokeless tobacco still contain nicotine  Talk to your healthcare provider before you use these products  Follow up with your doctor as directed:  Write down your questions so you remember to ask them during your visits    © Copyright [employer] 2022 Information is for End User's use only and may not be sold, redistributed or otherwise used for commercial purposes  The above information is an  only  It is not intended as medical advice for individual conditions or treatments  Talk to your doctor, nurse or pharmacist before following any medical regimen to see if it is safe and effective for you  Nutrition Tips for Relief of Diarrhea   WHAT YOU NEED TO KNOW:   There are diet changes you can make to help relieve or stop diarrhea  These changes include limiting or avoiding foods and liquids that are high in sugar, fat, fiber, and lactose  Lactose is a sugar found in milk products  Milk products can cause diarrhea in people who are lactose intolerant  You should also drink extra liquids to replace fluids that are lost when you have diarrhea  Diarrhea can lead to dehydration  DISCHARGE INSTRUCTIONS:   Foods to limit or avoid:   Dairy:      Whole milk    Half-and-half, cream, and sour cream    Regular (whole milk) ice cream    Grains:      Whole wheat and whole grain breads, pasta, cereals, and crackers    Brown and wild rice    Breads and cereals with seeds or nuts    Popcorn    Fruit and vegetables: All raw fruits, except bananas and melon    Dried fruits, including prunes and raisins    Canned fruit in heavy syrup    Prune juice and any fruit juice with pulp    Raw vegetables, except lettuce     Fried vegetables    Corn, raw and cooked broccoli, cabbage, cauliflower, and sasha greens    Protein:      Fried meat, poultry, and fish    High-fat luncheon meats, such as bologna    Fatty meats, such as sausage, guadalupe, and hot dogs    Beans and nuts    Liquids:      Sodas and fruit-flavored drinks    Drinks that contain caffeine, such as energy drinks, coffee, and tea     Drinks that contain alcohol or sugar alcohol, such as sorbitol    Foods and liquids you may eat or drink:  Most people can tolerate the foods and liquids listed below   If any of them make your symptoms worse, stop eating or drinking them until you feel better  If you are lactose intolerant, avoid milk products  Dairy:      Skim or low-fat milk or evaporated milk    Soy milk or buttermilk     Low-fat, part-skim, and aged cheese    Yogurt, low-fat ice cream, or sherbert    Grains:  (Choose foods with less than 2 grams of dietary fiber per serving )     White or refined flour breads, bagels, pasta, and crackers    Cold or hot cereals made from white or refined flour such as puffed rice, cornflakes, or cream of wheat    White rice    Fruit and vegetables:      Bananas or melon    Fruit juice without pulp, except prune juice    Canned fruit in juice or light syrup    Lettuce and most well-cooked vegetables without seeds or skins     Strained vegetable juice    Protein:      Tender, well-cooked meat, poultry, or fish    Well-cooked eggs or soy foods (cooked without added fat)    Smooth nut butters    Fats:  (Limit fats to less than 8 teaspoons a day)     Oil, butter, or margarine, or mayonnaise    Cream cheese or salad dressings    Liquids: For infants, breast milk or formula    Oral rehydration solution     Decaffeinated coffee or caffeine-free teas    Soft drinks without caffeine    Other guidelines to follow:   Drink liquids as directed  You may need to drink more liquids than usual to prevent dehydration  Ask how much liquid to drink each day and which liquids are best for you  You may need to drink an oral rehydration solution (ORS)  An ORS helps replace fluids and electrolytes that you lose when you have diarrhea  Eat small meals or snacks every 3 to 4 hours  instead of large meals  Continue eating even if you still have diarrhea  Your diarrhea will continue for a few days but should gradually go away  © Copyright [facility] 2022 Information is for End User's use only and may not be sold, redistributed or otherwise used for commercial purposes  The above information is an  only  It is not intended as medical advice for individual conditions or treatments  Talk to your doctor, nurse or pharmacist before following any medical regimen to see if it is safe and effective for you

## 2023-03-11 NOTE — PROGRESS NOTES
[facility]        NAME: Tammy Cruz is a 38 y o  female  : 1980    MRN: [id number]  DATE: 2023  TIME: 3:48 PM    Assessment and Plan   Periumbilical abdominal pain [R10 33]  1  Periumbilical abdominal pain  Ambulatory Referral to Gastroenterology      2  Nausea  ondansetron (ZOFRAN-ODT) dispersible tablet 8 mg    Ambulatory Referral to Gastroenterology    ondansetron (ZOFRAN) 8 mg tablet      3  Diarrhea, unspecified type  Ambulatory Referral to Gastroenterology        Patient has been having intermittent abdominal pain rated 7 out of 10 and described as sharp/stabbing for about a week with diarrhea and some nausea  Patient does not have any pain during her visit today  History of gastroparesis and multiple abdominal surgeries and cyclical nausea/vomiting in the past   PT has difficulty tolerating foods, has been strictly eating broths and soft small amounts of food  Trying to stay as hydrated as possible  Denies any blood in her stool  Advised patient to continue with Imodium and try daily probiotic supplement  Patient given referral for GI, advised her to follow-up as soon as possible  Instructed patient that she must go to the emergency room if her abdominal pain returns/worsens, is still unable to tolerate food, or is unable to tolerate liquids  Advised patient that in the urgent care, we do not provide any IV fluids or issue no imaging of the abdomen (outside of x-ray)  Advised to go to the emergency room if diarrhea is still persistent  Patient given 8 mg of Zofran in the clinic today, sent home prescription for this as well  Patient agreed with the plan of care and is willing to follow  Patient Instructions     Take prescribed medications as instructed  Tylenol or ibuprofen for pain or fever  Make sure to stay well-hydrated with clear fluids and/or broths  May try the brat diet-bananas, rice, applesauce, toast-until normal appetite resumes    If you are still unable to tolerate food/fluids within the next 24 hours, go to the emergency room  If abdominal pain returns and becomes severe, go to the emergency room  If diarrhea does not improve within the next 24 hours, go to the emergency room  Follow-up with GI referral as discussed/given  Follow up with PCP in 3-5 days  Proceed to  ER if symptoms worsen  Chief Complaint     Chief Complaint   Patient presents with   • Abdominal Pain     Intermittent abd pain 7/10 stabbing sharp pain, worsening with activity, no vomiting, reports nausea  Denies eating anything different  Was out running errands and started to feel unwell  Denies fever  Denies sick contacts  Reports feeling tired and light headed  Denies sore throat, has been staying hydrated  Has taken otc imodium and bismuth with no relief  • Diarrhea     Reports daily diarrhea since Saturday more than 3x/day  Deneis any blood in stool  Reports mucous  History of Present Illness       51-year-old female presents with 1 week of intermittent abdominal pain, nausea, diarrhea, and poor appetite  She states that she has been taking Imodium and Pepto over-the-counter with minimal more relief of diarrhea, episodes occurred 3-5 times per day  Denies any new foods or allergies to foods  Denies sick contacts  PT states that abdominal pain is 7 out of 10 and sharp/stabbing when it comes on  PT unable to identify what makes abdominal pain worse or better  PT has extensive GI history of gastroparesis, cyclical nausea/vomiting, cholecystectomy, appendectomy, Anders's procedure, Esophageal dilation, umbilical hernia repair, several upper GI endoscopies, biopsy  PT denies any blood in her stool but has noticed some mucus in the diarrhea  Denies any fever, chills, URI symptoms, chest pain, shortness of breath, constipation, blood in stool, vomiting  Review of Systems   Review of Systems   Constitutional: Positive for appetite change   Negative for chills and fever    HENT: Negative  Eyes: Negative  Respiratory: Negative  Cardiovascular: Negative  Gastrointestinal: Positive for abdominal pain, diarrhea and nausea  Negative for abdominal distention, blood in stool, constipation and vomiting  Musculoskeletal: Negative  Skin: Negative  Neurological: Negative            Current Medications       Current Outpatient Medications:   •  albuterol (PROVENTIL HFA,VENTOLIN HFA) 90 mcg/act inhaler, Inhale 2 puffs every 6 (six) hours as needed for wheezing, Disp: , Rfl:   •  budesonide-formoterol (SYMBICORT) 80-4 5 MCG/ACT inhaler, Inhale 2 puffs 2 (two) times a day Rinse mouth after use , Disp: , Rfl:   •  cholecalciferol (VITAMIN D3) 1,000 units tablet, Take 1,000 Units by mouth daily, Disp: , Rfl:   •  cyclobenzaprine (FLEXERIL) 5 mg tablet, Take 5 mg by mouth 3 (three) times a day as needed for muscle spasms, Disp: , Rfl:   •  ferrous sulfate 325 (65 Fe) mg tablet, Take 325 mg by mouth daily with breakfast, Disp: , Rfl:   •  gabapentin (NEURONTIN) 300 mg capsule, Take 300 mg by mouth daily at bedtime, Disp: , Rfl:   •  mupirocin (BACTROBAN) 2 % ointment, Apply topically 3 (three) times a day, Disp: 22 g, Rfl: 0  •  omeprazole (PriLOSEC) 20 mg delayed release capsule, Take 20 mg by mouth daily, Disp: , Rfl:   •  ondansetron (ZOFRAN) 4 mg tablet, Take 4 mg by mouth every 8 (eight) hours as needed for nausea or vomiting, Disp: , Rfl:   •  ondansetron (ZOFRAN) 8 mg tablet, Take 1 tablet (8 mg total) by mouth every 8 (eight) hours as needed for nausea or vomiting, Disp: 20 tablet, Rfl: 0  •  ondansetron (ZOFRAN-ODT) 4 mg disintegrating tablet, Take 1 tablet (4 mg total) by mouth every 6 (six) hours as needed for nausea or vomiting, Disp: 20 tablet, Rfl: 0  •  pantoprazole (PROTONIX) 40 mg tablet, Take 40 mg by mouth daily, Disp: , Rfl:   •  predniSONE 10 mg tablet, Take 6 pills day 1, then 5 pills day 2, 4 pills day 3, 3 pills day 4, 2 pills day 5, 1 pill day 6,, Disp: 21 tablet, Rfl: 0  •  traMADol (Ultram) 50 mg tablet, Take 1 tablet (50 mg total) by mouth every 6 (six) hours as needed for moderate pain, Disp: 10 tablet, Rfl: 0  No current facility-administered medications for this visit  Current Allergies     Allergies as of 03/11/2023 - Reviewed 03/11/2023   Allergen Reaction Noted   • Zithromax [azithromycin] Nausea Only 09/12/2022            The following portions of the patient's history were reviewed and updated as appropriate: allergies, current medications, past family history, past medical history, past social history, past surgical history and problem list      Past Medical History:   Diagnosis Date   • Arthritis    • Asthma    • Cancer ([facility] )     melanoma   • COVID-19 08/2021    mild s/s-fully vaccinated   • History of transfusion    • Melanoma ([facility] ) 8/24/22   • Pneumonia     as a child   • Shortness of breath     allergies trigger/on exersion   • Varicella        Past Surgical History:   Procedure Laterality Date   • APPENDECTOMY  2011   • CHOLECYSTECTOMY  2011   • ENDOMETRIAL ABLATION N/A 2021   • HERNIA REPAIR  [id number]    umbilical hernia   • INTESTINAL MALROTATION REPAIR N/A 11/2015   • SKIN BIOPSY  8/11/22   • SKIN LESION EXCISION N/A 10/07/2022    Procedure: WIDE EXCISION OF UPPER BACK MELANOMA;  Surgeon: Dominic Blank MD;  [facility]: [facility];  Service: Surgical Oncology   • SPINAL FUSION N/A 11/2005    with [place] shira-       Family History   Problem Relation Age of Onset   • Hypertension Mother    • Diabetes Father    • Hypertension Father    • Cancer Maternal Grandfather         Lung cancer   • Diabetes Paternal Grandfather    • Cancer Maternal Aunt         Skin - melanoma   • Cancer Maternal Uncle         Skin - melanoma   • Cancer Maternal Uncle         Skin - melanoma         Medications have been verified          Objective   /74   Pulse 100   Temp 97 7 °F (36 5 °C)   Resp 18   Ht 5' 6" (1 676 m)   Wt 74 4 kg (164 lb)   LMP 03/01/2023   SpO2 100%   BMI 26 47 kg/m²        Physical Exam     Physical Exam  Constitutional:       General: She is in acute distress (mild secondary to symptoms  )  Appearance: She is well-developed  She is ill-appearing  She is not toxic-appearing or diaphoretic  HENT:      Head: Normocephalic and atraumatic  Mouth/Throat:      Mouth: Mucous membranes are moist       Pharynx: Oropharynx is clear  No pharyngeal swelling or oropharyngeal exudate  Eyes:      Extraocular Movements: Extraocular movements intact  Pupils: Pupils are equal, round, and reactive to light  Cardiovascular:      Rate and Rhythm: Regular rhythm  Tachycardia present  Heart sounds: Normal heart sounds  No murmur heard  No friction rub  No gallop  Pulmonary:      Effort: Pulmonary effort is normal  No respiratory distress  Breath sounds: Normal breath sounds  No stridor  No wheezing, rhonchi or rales  Chest:      Chest wall: No tenderness  Abdominal:      General: Abdomen is flat  A surgical scar is present  Bowel sounds are normal  There is no distension  There are no signs of injury  Palpations: Abdomen is soft  There is no mass  Tenderness: There is no abdominal tenderness  There is no right CVA tenderness, left CVA tenderness, guarding or rebound  Hernia: No hernia is present  Comments: There is no acute tenderness/reproducible pain at this time  She admits to some mild "discomfort" in the periumbilical region  Salinas's, Rovsing's, McBurney's, and psoas signs not performed due to history of cholecystectomy and appendectomy  Skin:     General: Skin is warm and dry  Capillary Refill: Capillary refill takes less than 2 seconds  Findings: No rash  Neurological:      General: No focal deficit present  Mental Status: She is alert and oriented to person, place, and time  Cranial Nerves: No cranial nerve deficit  Motor: No weakness     Psychiatric: Mood and Affect: Mood is anxious           Behavior: Behavior normal

## 2023-03-15 ENCOUNTER — CONSULT (OUTPATIENT)
Dept: GASTROENTEROLOGY | Facility: CLINIC | Age: 43
End: 2023-03-15

## 2023-03-15 VITALS
DIASTOLIC BLOOD PRESSURE: 95 MMHG | HEIGHT: 66 IN | BODY MASS INDEX: 25.88 KG/M2 | HEART RATE: 106 BPM | WEIGHT: 161 LBS | SYSTOLIC BLOOD PRESSURE: 123 MMHG

## 2023-03-15 DIAGNOSIS — R10.33 PERIUMBILICAL ABDOMINAL PAIN: ICD-10-CM

## 2023-03-15 DIAGNOSIS — R19.7 DIARRHEA, UNSPECIFIED TYPE: ICD-10-CM

## 2023-03-15 DIAGNOSIS — R11.0 NAUSEA: ICD-10-CM

## 2023-03-15 RX ORDER — METRONIDAZOLE 500 MG/1
500 TABLET ORAL EVERY 8 HOURS SCHEDULED
Qty: 30 TABLET | Refills: 0 | Status: SHIPPED | OUTPATIENT
Start: 2023-03-15 | End: 2023-03-25

## 2023-03-15 NOTE — PROGRESS NOTES
[facility] - Outpatient Consultation  [facility] 38 y o  female MRN: [id number]  Encounter: [id number]          ASSESSMENT AND PLAN:      1  Periumbilical abdominal pain  Associated with acute diarrheal illness  - Ambulatory Referral to Gastroenterology    2  Nausea  Continue with ondansetron for now  - Ambulatory Referral to Gastroenterology    3  Diarrhea, unspecified type  Started less than a week after returning from [place] to [address] [place] [address]  Was seen in the emergency room  This visit was located up in the [place] area  Reviewed CAT scan felt consistent with possible gastroenteritis C-reactive protein was 24 CMP normal except for mildly elevated liver function tests AST 48 ALT 53 CBC unremarkable including differential   Will initiate Flagyl after stool studies have been collected  We will hold off on colonoscopy for now  Patient will need follow-up of LFTs in future  She will avoid milk products  She will initiate a probiotic  She will otherwise follow-up in a month or earlier if need be  - Ambulatory Referral to Gastroenterology  - Clostridium difficile toxin by PCR; Future  - Ova and parasite examination; Future  - Stool culture; Future  - White Blood Cells, Stool by Gram Stain; Future  - metroNIDAZOLE (FLAGYL) 500 mg tablet; Take 1 tablet (500 mg total) by mouth every 8 (eight) hours for 10 days  Dispense: 30 tablet; Refill: 0    ______________________________________________________________________    HPI: Very pleasant 32-year-old lady in less than a week [place] had periumbilical pain abdominal cramping diarrhea several times a day and awaken her at night  Was seen in a [facility] emergency room had a CAT scan which showed some enlarged lymph nodes CBC normal C-reactive protein 24 CMP unremarkable except for mildly elevated liver function test   She is status postcholecystectomy no recent antibiotics    No stool studies were done in the ER     She tried Imodium and Pepto  Has been taking ondansetron  Was started on PPI we discussed stopping that  No family history of celiac disease hepatitis pancreatitis inflammatory bowel disease  Her maternal great grandmother had colon cancer  REVIEW OF SYSTEMS:    CONSTITUTIONAL: Denies any fever, chills, rigors, and weight loss  HEENT: No earache or tinnitus  Denies hearing loss or visual disturbances  CARDIOVASCULAR: No chest pain or palpitations  RESPIRATORY: Denies any cough, hemoptysis, shortness of breath or dyspnea on exertion  GASTROINTESTINAL: As noted in the History of Present Illness  GENITOURINARY: No problems with urination  Denies any hematuria or dysuria  NEUROLOGIC: No dizziness or vertigo, denies headaches  MUSCULOSKELETAL: Denies any muscle or joint pain  SKIN: Denies skin rashes or itching  ENDOCRINE: Denies excessive thirst  Denies intolerance to heat or cold  PSYCHOSOCIAL: Denies depression or anxiety  Denies any recent memory loss         Historical Information   Past Medical History:   Diagnosis Date   • Arthritis    • Asthma    • Cancer ([facility] )     melanoma   • COVID-19 08/2021    mild s/s-fully vaccinated   • History of transfusion    • Melanoma ([facility] ) 8/24/22   • Pneumonia     as a child   • Shortness of breath     allergies trigger/on exersion   • Varicella      Past Surgical History:   Procedure Laterality Date   • APPENDECTOMY  2011   • CHOLECYSTECTOMY  2011   • ENDOMETRIAL ABLATION N/A 2021   • HERNIA REPAIR  8363    umbilical hernia   • INTESTINAL MALROTATION REPAIR N/A 11/2015   • SKIN BIOPSY  8/11/22   • SKIN LESION EXCISION N/A 10/07/2022    Procedure: WIDE EXCISION OF UPPER BACK MELANOMA;  Surgeon: Sebastien Mazariegos MD;  [facility];  Service: Surgical Oncology   • SPINAL FUSION N/A 11/2005    with Liset silva-     Social History   Social History     Substance and Sexual Activity   Alcohol Use Yes   • Alcohol/week: 2 0 standard drinks   • Types: 2 Glasses of wine per week    Comment: Not regularly     Social History     Substance and Sexual Activity   Drug Use Yes   • Frequency: 1 0 times per week   • Types: Marijuana    Comment: Medical Marijuana for pain     Social History     Tobacco Use   Smoking Status Never   Smokeless Tobacco Never     Family History   Problem Relation Age of Onset   • Hypertension Mother    • Diverticulosis Mother    • Irritable bowel syndrome Mother    • Diabetes Father    • Hypertension Father    • Cancer Maternal Grandfather         Lung cancer   • Diabetes Paternal Grandfather    • Cancer Maternal Aunt         Skin - melanoma   • Cancer Maternal Uncle         Skin - melanoma   • Cancer Maternal Uncle         Skin - melanoma   • Colon cancer Other        Meds/Allergies       Current Outpatient Medications:   •  albuterol (PROVENTIL HFA,VENTOLIN HFA) 90 mcg/act inhaler  •  budesonide-formoterol (SYMBICORT) 80-4 5 MCG/ACT inhaler  •  cholecalciferol (VITAMIN D3) 1,000 units tablet  •  cyclobenzaprine (FLEXERIL) 5 mg tablet  •  ferrous sulfate 325 (65 Fe) mg tablet  •  gabapentin (NEURONTIN) 300 mg capsule  •  metroNIDAZOLE (FLAGYL) 500 mg tablet  •  mupirocin (BACTROBAN) 2 % ointment  •  omeprazole (PriLOSEC) 20 mg delayed release capsule  •  ondansetron (ZOFRAN) 4 mg tablet  •  pantoprazole (PROTONIX) 40 mg tablet  •  ondansetron (ZOFRAN) 8 mg tablet  •  ondansetron (ZOFRAN-ODT) 4 mg disintegrating tablet  •  predniSONE 10 mg tablet  •  traMADol (Ultram) 50 mg tablet    Allergies   Allergen Reactions   • Zithromax [Azithromycin] Nausea Only           Objective     Blood pressure 123/95, pulse (!) 106, height 5' 6" (1 676 m), weight 73 kg (161 lb), last menstrual period 03/01/2023  Body mass index is 25 99 kg/m²          PHYSICAL EXAM:      General Appearance:   Alert, cooperative, no distress   HEENT:   Normocephalic, atraumatic, anicteric      Neck:  Supple, symmetrical, trachea midline   Lungs:   Clear to auscultation bilaterally; no rales, rhonchi or wheezing; respirations unlabored    Heart[de-identified]   Regular rate and rhythm; no murmur, rub, or gallop  Abdomen:   Soft, non-tender, non-distended; normal bowel sounds; no masses, no organomegaly    Genitalia:   Deferred    Rectal:   Deferred    Extremities:  No cyanosis, clubbing or edema    Pulses:  2+ and symmetric    Skin:  No jaundice, rashes, or lesions    Lymph nodes:  No palpable cervical lymphadenopathy        Lab Results:   No visits with results within 1 Day(s) from this visit  Latest known visit with results is:   Office Visit on 12/17/2022   Component Date Value   • POCT SARS-CoV-2 Ag 12/17/2022 Negative    • VALID CONTROL 12/17/2022 Valid          Radiology Results:   No results found  Answers for HPI/ROS submitted by the patient on 3/14/2023  Chronicity: new  Onset: 1 to 4 weeks ago  Onset quality: sudden  Episode duration: 1 Hours  Pain location: epigastric region  Pain - numeric: 8/10  Pain quality: sharp  anorexia: Yes  belching: No  constipation: No  diarrhea: Yes  dysuria: No  fever: No  flatus: No  frequency: No  headaches: Yes  hematochezia: No  hematuria: No  melena: No  myalgias: No  weight loss: Yes  vomiting: No  Aggravated by: eating, movement  Relieved by: being still, recumbency  Diagnostic workup: CT scan    Conflicting answers have been found for some questions  Please document the patient's answers manually

## 2023-03-16 ENCOUNTER — APPOINTMENT (OUTPATIENT)
Dept: LAB | Facility: HOSPITAL | Age: 43
End: 2023-03-16
Attending: INTERNAL MEDICINE

## 2023-03-16 DIAGNOSIS — R19.7 DIARRHEA, UNSPECIFIED TYPE: Primary | ICD-10-CM

## 2023-03-17 ENCOUNTER — TELEPHONE (OUTPATIENT)
Dept: GASTROENTEROLOGY | Facility: CLINIC | Age: 43
End: 2023-03-17

## 2023-03-17 DIAGNOSIS — A02.0 SALMONELLA GASTROENTERITIS: Primary | ICD-10-CM

## 2023-03-17 LAB
CAMPYLOBACTER DNA SPEC NAA+PROBE: ABNORMAL
SALMONELLA DNA SPEC QL NAA+PROBE: DETECTED
SHIGA TOXIN STX GENE SPEC NAA+PROBE: ABNORMAL
SHIGELLA DNA SPEC QL NAA+PROBE: ABNORMAL

## 2023-03-17 RX ORDER — CIPROFLOXACIN 500 MG/1
500 TABLET, FILM COATED ORAL EVERY 12 HOURS SCHEDULED
Qty: 14 TABLET | Refills: 0 | Status: SHIPPED | OUTPATIENT
Start: 2023-03-17 | End: 2023-03-24

## 2023-03-17 NOTE — TELEPHONE ENCOUNTER
Inform patient of positive Salmonella  Discussed the pluses and minuses of treatment  This has been going on 10 days she is quite uncomfortable  We will send a prescription in for Cipro 500 mg twice a day for 7 days

## 2023-03-19 LAB — CULTURE ID FROM ENTERIC PCR: ABNORMAL

## 2023-03-25 LAB — CULTURE ID FROM ENTERIC PCR: ABNORMAL

## 2023-03-28 ENCOUNTER — TELEPHONE (OUTPATIENT)
Dept: GASTROENTEROLOGY | Facility: AMBULATORY SURGERY CENTER | Age: 43
End: 2023-03-28

## 2023-04-05 ENCOUNTER — TELEPHONE (OUTPATIENT)
Dept: HEMATOLOGY ONCOLOGY | Facility: CLINIC | Age: 43
End: 2023-04-05

## 2023-04-05 NOTE — TELEPHONE ENCOUNTER
Appointment Cancellation or Reschedule     Person calling in Patient   If someone other than patient calling, are they listed on the communication consent form? N/A   Provider Dr Meliza Ayers   Office Visit Date and Time  5/10/2023 220pm   [facility] Visit Location [facility]   Did patient want to reschedule their visit? If so, when was it scheduled to? 5/15/23 920am   Did the patient have STAR scheduled for this appointment? N/A   Does the patient need STAR scheduled for their new appointment? N/A   Is this patient calling to reschedule an infusion appointment? N/A   When is their next infusion appointment? n/a   Is this patient a Chemo patient? No   Reason for Cancellation or Reschedule Provider will be out of the office as per in-basket message    Was the No show policy reviewed with patient if patient is canceling within 24 hours? Yes     If the patient is cancelling an appointment and needs their STAR Transport cancelled, please route to [facility]  If the patient is a treatment patient, please route this to the office nurse  If the patient is not on treatment, please route to the Clerical pool based on location  If the patient is a surgical oncology patient, please route to surg/onc clinical pool  Route message as high priority if same day cancellation

## 2023-05-05 ENCOUNTER — TELEPHONE (OUTPATIENT)
Dept: GASTROENTEROLOGY | Facility: CLINIC | Age: 43
End: 2023-05-05

## 2023-05-05 NOTE — TELEPHONE ENCOUNTER
Patient was called regarding her status after treatment with antibiotics  Brief message left in voicemail

## 2023-05-10 ENCOUNTER — TELEPHONE (OUTPATIENT)
Dept: HEMATOLOGY ONCOLOGY | Facility: CLINIC | Age: 43
End: 2023-05-10

## 2023-05-12 ENCOUNTER — TELEPHONE (OUTPATIENT)
Dept: HEMATOLOGY ONCOLOGY | Facility: CLINIC | Age: 43
End: 2023-05-12

## 2023-05-12 NOTE — TELEPHONE ENCOUNTER
5/12/23 LMOM for pt to get her labs done prior to appt  Gave hopeline number if she needs to reschedule

## 2023-06-12 ENCOUNTER — TELEPHONE (OUTPATIENT)
Dept: HEMATOLOGY ONCOLOGY | Facility: CLINIC | Age: 43
End: 2023-06-12

## 2023-06-12 NOTE — TELEPHONE ENCOUNTER
Appointment Change  Cancel, Reschedule, Change to Virtual      Who are you speaking with? Patient   If it is not the patient, are they listed on an active communication consent form? N/A   Which provider is the appointment scheduled with? Dr Suzy Cochran   When is the appointment scheduled? Please list date and time  5/15/23 @ 9:20am   At which location is the appointment scheduled to take place? [facility]   Was the appointment rescheduled or changed from an in person visit to a virtual visit? If so, please list the details of the change  Yes 7/12/23 @ 2:20pm/ patient is also requesting new lab order be mailed to her    What is the reason for the appointment change? Patient was unable to keep appointment   Was STAR transport scheduled for this visit? no   Does STAR transport need to be scheduled for the new visit (if applicable) no   Does the patient need an infusion appointment rescheduled? no   Does the patient have an infusion appointment scheduled? If so, when? no   Is the patient undergoing chemotherapy? no   Was the no-show policy reviewed for appointments being changed with less then 24 hours of notice?  yes

## 2023-07-10 ENCOUNTER — TELEPHONE (OUTPATIENT)
Dept: HEMATOLOGY ONCOLOGY | Facility: CLINIC | Age: 43
End: 2023-07-10

## 2023-07-12 ENCOUNTER — OFFICE VISIT (OUTPATIENT)
Dept: HEMATOLOGY ONCOLOGY | Facility: CLINIC | Age: 43
End: 2023-07-12
Payer: COMMERCIAL

## 2023-07-12 VITALS
RESPIRATION RATE: 16 BRPM | OXYGEN SATURATION: 99 % | BODY MASS INDEX: 26.84 KG/M2 | HEIGHT: 66 IN | WEIGHT: 167 LBS | SYSTOLIC BLOOD PRESSURE: 100 MMHG | TEMPERATURE: 98.3 F | HEART RATE: 94 BPM | DIASTOLIC BLOOD PRESSURE: 70 MMHG

## 2023-07-12 DIAGNOSIS — Z85.820 ENCOUNTER FOR FOLLOW-UP SURVEILLANCE OF MELANOMA: ICD-10-CM

## 2023-07-12 DIAGNOSIS — Z08 ENCOUNTER FOR FOLLOW-UP SURVEILLANCE OF MELANOMA: ICD-10-CM

## 2023-07-12 DIAGNOSIS — C43.59 MELANOMA OF BACK (HCC): Primary | ICD-10-CM

## 2023-07-12 PROCEDURE — 99213 OFFICE O/P EST LOW 20 MIN: CPT | Performed by: INTERNAL MEDICINE

## 2023-07-12 NOTE — LETTER
September 9, 2023     Lakeshia Omalley, [address] [facility] [address]  [address] [address] [facility] [address] [address]    Patient: Roland Mclaughlin   YOB: 1980   Date of Visit: 7/12/2023       Dear Dr. Lazarus Merino: Thank you for referring Roland Mclaughlin to me for evaluation. Below are my notes for this consultation. If you have questions, please do not hesitate to call me. I look forward to following your patient along with you. Sincerely,        Iver Ormond, MD        CC: No Recipients    Iver Ormond, MD  9/9/2023 11:37 AM  Sign when Signing Visit  [address] [facility] [address]  [address]  [phone]     Date of Visit: 7/12/2023  Name: Roland Mclaughlin   YOB: 1980        Subjective    VISIT DIAGNOSIS:  Diagnoses and all orders for this visit:    Melanoma of back ([address])  -     CBC and differential; Future  -     Comprehensive metabolic panel; Future  -     LD,Blood; Future    Encounter for follow-up surveillance of melanoma        Oncology History   Melanoma of back ([address])   8/11/2022 -  Cancer Staged    Staging form: Melanoma of the Skin, AJCC 8th Edition  - Clinical stage from 8/11/2022: Stage IA (cT1a, cN0, cM0) - Signed by Iver Ormond, MD on 9/12/2022 8/11/2022 Biopsy    Right upper back, shave biopsy  0.4mm  No ulceration   No mitoses      9/12/2022 Initial Diagnosis    Melanoma of back [facility])        Cancer Staging   Melanoma of back [facility])  Staging form: Melanoma of the Skin, AJCC 8th Edition  - Clinical stage from 8/11/2022: Stage IA (cT1a, cN0, cM0) - Signed by Iver Ormond, MD on 9/12/2022          HISTORY OF PRESENT ILLNESS: Roland Mclaughlin is a 38 y.o. female  who has stage Ia melanoma here for continued monitoring, follow-up, and surveillance. Doing well. Denies any new, changing, concerning skin lesions. No lymphadenopathy. Continues to follow with dermatology. Does practice safe sun practices.         REVIEW OF SYSTEMS:  Review of Systems   Constitutional: Negative for appetite change, fatigue, fever and unexpected weight change. HENT:   Negative for lump/mass. Eyes: Negative for icterus. Respiratory: Negative for cough, shortness of breath and wheezing. Cardiovascular: Negative for leg swelling. Gastrointestinal: Negative for abdominal pain, constipation, diarrhea, nausea and vomiting. Genitourinary: Negative for difficulty urinating and hematuria. Musculoskeletal: Negative for arthralgias, gait problem and myalgias. Skin: Negative for itching and rash. No new, changing, or concerning lesions. Neurological: Negative for extremity weakness, gait problem, headaches, light-headedness and numbness. Hematological: Negative for adenopathy.         MEDICATIONS:    Current Outpatient Medications:   •  albuterol (PROVENTIL HFA,VENTOLIN HFA) 90 mcg/act inhaler, Inhale 2 puffs every 6 (six) hours as needed for wheezing, Disp: , Rfl:   •  budesonide-formoterol (SYMBICORT) 80-4.5 MCG/ACT inhaler, Inhale 2 puffs 2 (two) times a day Rinse mouth after use., Disp: , Rfl:   •  cholecalciferol (VITAMIN D3) 1,000 units tablet, Take 1,000 Units by mouth daily, Disp: , Rfl:   •  cyclobenzaprine (FLEXERIL) 5 mg tablet, Take 5 mg by mouth 3 (three) times a day as needed for muscle spasms, Disp: , Rfl:   •  ferrous sulfate 325 (65 Fe) mg tablet, Take 325 mg by mouth daily with breakfast, Disp: , Rfl:   •  gabapentin (NEURONTIN) 300 mg capsule, Take 300 mg by mouth daily at bedtime, Disp: , Rfl:   •  omeprazole (PriLOSEC) 20 mg delayed release capsule, Take 20 mg by mouth daily, Disp: , Rfl:   •  ondansetron (ZOFRAN) 4 mg tablet, Take 4 mg by mouth every 8 (eight) hours as needed for nausea or vomiting, Disp: , Rfl:   •  pantoprazole (PROTONIX) 40 mg tablet, Take 40 mg by mouth daily, Disp: , Rfl:   •  FLUoxetine (PROzac) 10 MG tablet, , Disp: , Rfl:   •  medroxyPROGESTERone (PROVERA) 5 mg tablet, Take 1 tablet (5 mg total) by mouth daily, Disp: 30 tablet, Rfl: 1     ALLERGIES:  Allergies   Allergen Reactions   • Azithromycin Nausea Only and GI Intolerance        ACTIVE PROBLEMS:  Patient Active Problem List   Diagnosis   • Melanoma of back [facility])          PAST MEDICAL HISTORY:   Past Medical History:   Diagnosis Date   • Abnormal Pap smear of cervix    • Anemia    • Arthritis    • Asthma    • Cancer ([address])     melanoma   • COVID-19 08/2021    mild s/s-fully vaccinated   • History of transfusion    • Melanoma ([address]) 8/24/22   • Pneumonia     as a child   • Shortness of breath     allergies trigger/on exersion   • Varicella         PAST SURGICAL HISTORY:  Past Surgical History:   Procedure Laterality Date   • APPENDECTOMY  2011   • BREAST BIOPSY     • CHOLECYSTECTOMY  2011   • ENDOMETRIAL ABLATION N/A 2021   • HERNIA REPAIR  6921    umbilical hernia   • INTESTINAL MALROTATION REPAIR N/A 11/2015   • MYOMECTOMY     • SKIN BIOPSY  8/11/22   • SKIN LESION EXCISION N/A 10/07/2022    Procedure: WIDE EXCISION OF UPPER BACK MELANOMA;  Surgeon: Octavia Alfred MD;  Location: [facility];  Service: Surgical Oncology   • SPINAL FUSION N/A 11/2005    with [place] shira-        SOCIAL HISTORY:  Social History     Socioeconomic History   • Marital status: /Civil Union     Spouse name: None   • Number of children: None   • Years of education: None   • Highest education level: None   Occupational History   • None   Tobacco Use   • Smoking status: Never   • Smokeless tobacco: Never   Vaping Use   • Vaping Use: Never used   Substance and Sexual Activity   • Alcohol use: Not Currently     Alcohol/week: 2.0 standard drinks of alcohol     Types: 2 Glasses of wine per week     Comment: Not regularly   • Drug use: Not Currently     Frequency: 1.0 times per week     Types: Marijuana     Comment: Medical Marijuana for pain   • Sexual activity: Yes     Partners: Male     Birth control/protection: None   Other Topics Concern   • None   Social History Narrative   • None     Social Determinants of Health     Financial Resource Strain: Not on file   Food Insecurity: Not on file   Transportation Needs: Not on file   Physical Activity: Not on file   Stress: Not on file   Social Connections: Not on file   Intimate Partner Violence: Not on file   Housing Stability: Not on file        FAMILY HISTORY:  Family History   Problem Relation Age of Onset   • Hypertension Mother    • Diverticulosis Mother    • Irritable bowel syndrome Mother    • Heart attack Mother    • Osteoarthritis Mother    • Osteoporosis Mother    • Diabetes Father    • Hypertension Father    • Asthma Father    • Heart attack Father    • Heart disease Father    • Cancer Maternal Grandfather         Lung cancer   • Diabetes Paternal Grandfather    • Cancer Maternal Aunt         Skin - melanoma   • Cancer Maternal Uncle         Skin - melanoma   • Cancer Maternal Uncle         Skin - melanoma   • Colon cancer Other    • Colon cancer Maternal Grandmother    • Cancer Maternal Grandmother            Objective    PHYSICAL EXAMINATION:   Blood pressure 100/70, pulse 94, temperature 98.3 °F (36.8 °C), temperature source Temporal, resp. rate 16, height 5' 6" (1.676 m), weight 75.8 kg (167 lb), SpO2 99 %. Pain Score: 0-No pain     ECOG Performance Status      Flowsheet Row Most Recent Value   ECOG Performance Status 0 - Fully active, able to carry on all pre-disease performance without restriction               Physical Exam  Constitutional:       General: She is not in acute distress. Appearance: Normal appearance. She is not ill-appearing or toxic-appearing. HENT:      Head: Normocephalic and atraumatic. Right Ear: External ear normal.      Left Ear: External ear normal.      Nose: Nose normal.      Mouth/Throat:      Mouth: Mucous membranes are moist.      Pharynx: Oropharynx is clear. Eyes:      General: No scleral icterus. Right eye: No discharge. Left eye: No discharge. Conjunctiva/sclera: Conjunctivae normal.   Cardiovascular:      Rate and Rhythm: Normal rate and regular rhythm. Pulses: Normal pulses. Heart sounds: No murmur heard. No friction rub. No gallop. Pulmonary:      Effort: Pulmonary effort is normal. No respiratory distress. Breath sounds: Normal breath sounds. No wheezing or rales. Abdominal:      General: Bowel sounds are normal. There is no distension. Palpations: There is no mass. Tenderness: There is no abdominal tenderness. There is no rebound. Musculoskeletal:         General: No swelling or tenderness. Cervical back: Normal range of motion. No rigidity. Right lower leg: No edema. Left lower leg: No edema. Lymphadenopathy:      Head:      Right side of head: No submandibular, preauricular or posterior auricular adenopathy. Left side of head: No submandibular, preauricular or posterior auricular adenopathy. Cervical: No cervical adenopathy. Right cervical: No superficial or posterior cervical adenopathy. Left cervical: No superficial or posterior cervical adenopathy. Upper Body:      Right upper body: No supraclavicular or axillary adenopathy. Left upper body: No supraclavicular or axillary adenopathy. Lower Body: No right inguinal adenopathy. No left inguinal adenopathy. Skin:     General: Skin is warm. Coloration: Skin is not jaundiced. Findings: No lesion or rash. Comments: Well healed surgical scar. No evidence of recurrence at primary site. Neurological:      General: No focal deficit present. Mental Status: She is alert and oriented to person, place, and time. Cranial Nerves: No cranial nerve deficit. Motor: No weakness. Gait: Gait normal.   Psychiatric:         Mood and Affect: Mood normal.         Behavior: Behavior normal.         Thought Content:  Thought content normal.         Judgment: Judgment normal.         I reviewed lab data in the chart. Labs done at outside lab and scanned into media tab. Reviewed and within normal limits or not clinically significant if they are outside of normal limits. RECENT IMAGING:  No results found. Assessment/Plan  Ms. Radha Mercedes is a 53-year-old female with stage Ia melanoma here for continued monitoring, follow-up and surveillance. 1. Melanoma of back ([address])  2. Encounter for follow-up surveillance of melanoma  She is doing well and has no clinical evidence of melanoma recurrence or metastasis. She continues with close follow-up with dermatology. She continues to practice safe sun practices. Labs reviewed and okay. We will continue monitoring for her every 6 months. She will return in 6 months for full body exam and blood work. All orders placed and prescription provided. She knows to call with issues or concerns prior to her next visit. - CBC and differential; Future  - Comprehensive metabolic panel; Future  - LD,Blood; Future        Return in about 6 months (around 1/12/2024) for Office Visit, labs.    Roselyn Wang MD, PhD

## 2023-07-31 ENCOUNTER — OFFICE VISIT (OUTPATIENT)
Dept: OBGYN CLINIC | Facility: MEDICAL CENTER | Age: 43
End: 2023-07-31
Payer: COMMERCIAL

## 2023-07-31 VITALS
SYSTOLIC BLOOD PRESSURE: 114 MMHG | HEIGHT: 66 IN | DIASTOLIC BLOOD PRESSURE: 86 MMHG | WEIGHT: 167.8 LBS | BODY MASS INDEX: 26.97 KG/M2

## 2023-07-31 DIAGNOSIS — N93.9 ABNORMAL UTERINE BLEEDING (AUB): Primary | ICD-10-CM

## 2023-07-31 PROCEDURE — 99203 OFFICE O/P NEW LOW 30 MIN: CPT | Performed by: OBSTETRICS & GYNECOLOGY

## 2023-07-31 RX ORDER — FLUOXETINE 10 MG/1
TABLET, FILM COATED ORAL
COMMUNITY
Start: 2023-07-27

## 2023-07-31 RX ORDER — MEDROXYPROGESTERONE ACETATE 5 MG/1
5 TABLET ORAL DAILY
Qty: 30 TABLET | Refills: 1 | Status: SHIPPED | OUTPATIENT
Start: 2023-07-31

## 2023-07-31 NOTE — PROGRESS NOTES
Assessment:  38 y.o. [id number] who presents to discuss hysterectomy for AUB. Multiple prior failed medical and surgical treatments noted. Agree with request for hysterectomy. Plan:  Diagnoses and all orders for this visit:    Abnormal uterine bleeding (AUB)  -     medroxyPROGESTERone (PROVERA) 5 mg tablet; Take 1 tablet (5 mg total) by mouth daily  - Plan for TLH, BS, cysto      __________________________________________________________________    Subjective   Arabella Santizo is a 38 y.o.  who presents to discuss hysterectomy for AUB. Was scheduled to have hysterectomy in end of August, but was called 2mo before then and told office is closing. Bleeding starts as a normal menses with active flow for a few weeks, then tapers to a spotting. Sometimes stops, sometimes goes right into next. Cramping noted with active flow. Moderate intensity. Some radiation into hips. Hx anemia prior to ablation. Was prolonged even prior to ablation. Not increasing in severity since then. No anemia symptoms. Tried numerous contraceptives. Declined IUDs. Tried lysteda. None were helpful. Hx myomectomy,  with ablation. Decreased bleeding but did not stop it. Hx umb hernia repari with mesh. Hx transfusion, perioperative. The procedure was reviewed in detail with the patient. We reviewed the risks of the procedure include bleeding, infection, injury to bowel/bladder/ureters/neurovascular structures, or diagnosis of occult pathology. We reviewed that she has a hx benign path prior, need result for chart. This can reasonably exclude malignancy, but that this only assesses the endometrium and is not as comprehensive as the surgical pathology will be. We reviewed same-day surgery, recovery, and follow up. Patient had the opportunity to ask questions, which were answered to her satisfaction.         The following portions of the patient's history were reviewed and updated as appropriate: allergies, current medications, past medical history, past social history, past surgical history and problem list.    Review of Systems  Review of Systems   Constitutional: Negative for chills, fatigue and fever. Respiratory: Negative for shortness of breath. Cardiovascular: Negative for chest pain and palpitations. Gastrointestinal: Negative for abdominal distention, abdominal pain, constipation, diarrhea, nausea and vomiting. Genitourinary: Positive for menstrual problem, pelvic pain and vaginal bleeding. Negative for dysuria, flank pain, frequency, urgency, vaginal discharge and vaginal pain. Skin: Negative for rash and wound. Neurological: Negative for dizziness, light-headedness and headaches. Objective  /86   Ht 5' 6" (1.676 m)   Wt 76.1 kg (167 lb 12.8 oz)   LMP  (LMP Unknown)   BMI 27.08 kg/m²      Physical Exam:  Physical Exam  Constitutional:       General: She is not in acute distress. Appearance: Normal appearance. She is not ill-appearing, toxic-appearing or diaphoretic. Eyes:      General: No scleral icterus. Right eye: No discharge. Left eye: No discharge. Conjunctiva/sclera: Conjunctivae normal.   Cardiovascular:      Rate and Rhythm: Normal rate. Pulmonary:      Effort: Pulmonary effort is normal. No respiratory distress. Abdominal:      General: There is no distension. Palpations: There is no mass. Tenderness: There is no abdominal tenderness. There is no guarding or rebound. Hernia: No hernia is present. Musculoskeletal:         General: No swelling. Skin:     General: Skin is warm and dry. Coloration: Skin is not jaundiced or pale. Findings: No bruising or erythema. Neurological:      Mental Status: She is alert. Psychiatric:         Mood and Affect: Mood normal.         Behavior: Behavior normal.         Thought Content:  Thought content normal.         Judgment: Judgment normal.         Reviewed  Pelvic US 7/18/23  CT A&P 7/18/23  CBC, CMP (7/18/23)

## 2023-08-14 ENCOUNTER — PREP FOR PROCEDURE (OUTPATIENT)
Dept: OBGYN CLINIC | Facility: MEDICAL CENTER | Age: 43
End: 2023-08-14

## 2023-08-14 DIAGNOSIS — N93.9 ABNORMAL UTERINE BLEEDING (AUB): Primary | ICD-10-CM

## 2023-08-17 RX ORDER — CEFAZOLIN SODIUM 2 G/50ML
2000 SOLUTION INTRAVENOUS ONCE
OUTPATIENT
Start: 2023-08-17 | End: 2023-08-17

## 2023-08-31 NOTE — PROGRESS NOTES
[facility] [place]  [address] [place] [address]  [phone]     Date of Visit: 7/12/2023  Name: Imelda Serrano   YOB: 1980        Subjective    VISIT DIAGNOSIS:  Diagnoses and all orders for this visit:    Melanoma of back ([address] [facility])  -     CBC and differential; Future  -     Comprehensive metabolic panel; Future  -     LD,Blood; Future    Encounter for follow-up surveillance of melanoma        Oncology History   Melanoma of back ([address])   8/11/2022 -  Cancer Staged    Staging form: Melanoma of the Skin, AJCC 8th Edition  - Clinical stage from 8/11/2022: Stage IA (cT1a, cN0, cM0) - Signed by Dee Oconnor MD on 9/12/2022 8/11/2022 Biopsy    Right upper back, shave biopsy  0.4mm  No ulceration   No mitoses      9/12/2022 Initial Diagnosis    Melanoma of back [facility])        Cancer Staging   Melanoma of back [facility])  Staging form: Melanoma of the Skin, AJCC 8th Edition  - Clinical stage from 8/11/2022: Stage IA (cT1a, cN0, cM0) - Signed by Dee Oconnor MD on 9/12/2022          HISTORY OF PRESENT ILLNESS: Imelda Serrano is a 38 y.o. female  who has stage Ia melanoma here for continued monitoring, follow-up, and surveillance. Doing well. Denies any new, changing, concerning skin lesions. No lymphadenopathy. Continues to follow with dermatology. Does practice safe sun practices. REVIEW OF SYSTEMS:  Review of Systems   Constitutional: Negative for appetite change, fatigue, fever and unexpected weight change. HENT:   Negative for lump/mass. Eyes: Negative for icterus. Respiratory: Negative for cough, shortness of breath and wheezing. Cardiovascular: Negative for leg swelling. Gastrointestinal: Negative for abdominal pain, constipation, diarrhea, nausea and vomiting. Genitourinary: Negative for difficulty urinating and hematuria. Musculoskeletal: Negative for arthralgias, gait problem and myalgias.    Skin: Negative for itching and rash. No new, changing, or concerning lesions. Neurological: Negative for extremity weakness, gait problem, headaches, light-headedness and numbness. Hematological: Negative for adenopathy.         MEDICATIONS:    Current Outpatient Medications:   •  albuterol (PROVENTIL HFA,VENTOLIN HFA) 90 mcg/act inhaler, Inhale 2 puffs every 6 (six) hours as needed for wheezing, Disp: , Rfl:   •  budesonide-formoterol (SYMBICORT) 80-4.5 MCG/ACT inhaler, Inhale 2 puffs 2 (two) times a day Rinse mouth after use., Disp: , Rfl:   •  cholecalciferol (VITAMIN D3) 1,000 units tablet, Take 1,000 Units by mouth daily, Disp: , Rfl:   •  cyclobenzaprine (FLEXERIL) 5 mg tablet, Take 5 mg by mouth 3 (three) times a day as needed for muscle spasms, Disp: , Rfl:   •  ferrous sulfate 325 (65 Fe) mg tablet, Take 325 mg by mouth daily with breakfast, Disp: , Rfl:   •  gabapentin (NEURONTIN) 300 mg capsule, Take 300 mg by mouth daily at bedtime, Disp: , Rfl:   •  omeprazole (PriLOSEC) 20 mg delayed release capsule, Take 20 mg by mouth daily, Disp: , Rfl:   •  ondansetron (ZOFRAN) 4 mg tablet, Take 4 mg by mouth every 8 (eight) hours as needed for nausea or vomiting, Disp: , Rfl:   •  pantoprazole (PROTONIX) 40 mg tablet, Take 40 mg by mouth daily, Disp: , Rfl:   •  FLUoxetine (PROzac) 10 MG tablet, , Disp: , Rfl:   •  medroxyPROGESTERone (PROVERA) 5 mg tablet, Take 1 tablet (5 mg total) by mouth daily, Disp: 30 tablet, Rfl: 1     ALLERGIES:  Allergies   Allergen Reactions   • Azithromycin Nausea Only and GI Intolerance        ACTIVE PROBLEMS:  Patient Active Problem List   Diagnosis   • Melanoma of back [facility])          PAST MEDICAL HISTORY:   Past Medical History:   Diagnosis Date   • Abnormal Pap smear of cervix    • Anemia    • Arthritis    • Asthma    • Cancer ([address] )     melanoma   • COVID-19 08/2021    mild s/s-fully vaccinated   • History of transfusion    • Melanoma ([address]) 8/24/22   • Pneumonia     as a child   • Shortness of breath     allergies trigger/on exersion   • Varicella         PAST SURGICAL HISTORY:  Past Surgical History:   Procedure Laterality Date   • APPENDECTOMY  2011   • BREAST BIOPSY     • CHOLECYSTECTOMY  2011   • ENDOMETRIAL ABLATION N/A 2021   • HERNIA REPAIR  4981    umbilical hernia   • INTESTINAL MALROTATION REPAIR N/A 11/2015   • MYOMECTOMY     • SKIN BIOPSY  8/11/22   • SKIN LESION EXCISION N/A 10/07/2022    Procedure: WIDE EXCISION OF UPPER BACK MELANOMA;  Surgeon: Mike Guzman MD;  [facility];  Service: Surgical Oncology   • SPINAL FUSION N/A 11/2005    with [place] shira-        SOCIAL HISTORY:  Social History     Socioeconomic History   • Marital status: /Civil Union     Spouse name: None   • Number of children: None   • Years of education: None   • Highest education level: None   Occupational History   • None   Tobacco Use   • Smoking status: Never   • Smokeless tobacco: Never   Vaping Use   • Vaping Use: Never used   Substance and Sexual Activity   • Alcohol use: Not Currently     Alcohol/week: 2.0 standard drinks of alcohol     Types: 2 Glasses of wine per week     Comment: Not regularly   • Drug use: Not Currently     Frequency: 1.0 times per week     Types: Marijuana     Comment: Medical Marijuana for pain   • Sexual activity: Yes     Partners: Male     Birth control/protection: None   Other Topics Concern   • None   Social History Narrative   • None     Social Determinants of Health     Financial Resource Strain: Not on file   Food Insecurity: Not on file   Transportation Needs: Not on file   Physical Activity: Not on file   Stress: Not on file   Social Connections: Not on file   Intimate Partner Violence: Not on file   Housing Stability: Not on file        FAMILY HISTORY:  Family History   Problem Relation Age of Onset   • Hypertension Mother    • Diverticulosis Mother    • Irritable bowel syndrome Mother    • Heart attack Mother    • Osteoarthritis Mother    • Osteoporosis Mother    • Diabetes Father    • Hypertension Father    • Asthma Father    • Heart attack Father    • Heart disease Father    • Cancer Maternal Grandfather         Lung cancer   • Diabetes Paternal Grandfather    • Cancer Maternal Aunt         Skin - melanoma   • Cancer Maternal Uncle         Skin - melanoma   • Cancer Maternal Uncle         Skin - melanoma   • Colon cancer Other    • Colon cancer Maternal Grandmother    • Cancer Maternal Grandmother            Objective    PHYSICAL EXAMINATION:   Blood pressure 100/70, pulse 94, temperature 98.3 °F (36.8 °C), temperature source Temporal, resp. rate 16, height 5' 6" (1.676 m), weight 75.8 kg (167 lb), SpO2 99 %. Pain Score: 0-No pain     ECOG Performance Status    Flowsheet Row Most Recent Value   ECOG Performance Status 0 - Fully active, able to carry on all pre-disease performance without restriction             Physical Exam  Constitutional:       General: She is not in acute distress. Appearance: Normal appearance. She is not ill-appearing or toxic-appearing. HENT:      Head: Normocephalic and atraumatic. Right Ear: External ear normal.      Left Ear: External ear normal.      Nose: Nose normal.      Mouth/Throat:      Mouth: Mucous membranes are moist.      Pharynx: Oropharynx is clear. Eyes:      General: No scleral icterus. Right eye: No discharge. Left eye: No discharge. Conjunctiva/sclera: Conjunctivae normal.   Cardiovascular:      Rate and Rhythm: Normal rate and regular rhythm. Pulses: Normal pulses. Heart sounds: No murmur heard. No friction rub. No gallop. Pulmonary:      Effort: Pulmonary effort is normal. No respiratory distress. Breath sounds: Normal breath sounds. No wheezing or rales. Abdominal:      General: Bowel sounds are normal. There is no distension. Palpations: There is no mass. Tenderness: There is no abdominal tenderness. There is no rebound.    Musculoskeletal: General: No swelling or tenderness. Cervical back: Normal range of motion. No rigidity. Right lower leg: No edema. Left lower leg: No edema. Lymphadenopathy:      Head:      Right side of head: No submandibular, preauricular or posterior auricular adenopathy. Left side of head: No submandibular, preauricular or posterior auricular adenopathy. Cervical: No cervical adenopathy. Right cervical: No superficial or posterior cervical adenopathy. Left cervical: No superficial or posterior cervical adenopathy. Upper Body:      Right upper body: No supraclavicular or axillary adenopathy. Left upper body: No supraclavicular or axillary adenopathy. Lower Body: No right inguinal adenopathy. No left inguinal adenopathy. Skin:     General: Skin is warm. Coloration: Skin is not jaundiced. Findings: No lesion or rash. Comments: Well healed surgical scar. No evidence of recurrence at primary site. Neurological:      General: No focal deficit present. Mental Status: She is alert and oriented to person, place, and time. Cranial Nerves: No cranial nerve deficit. Motor: No weakness. Gait: Gait normal.   Psychiatric:         Mood and Affect: Mood normal.         Behavior: Behavior normal.         Thought Content: Thought content normal.         Judgment: Judgment normal.         I reviewed lab data in the chart. Labs done at outside lab and scanned into media tab. Reviewed and within normal limits or not clinically significant if they are outside of normal limits. RECENT IMAGING:  No results found. Assessment/Plan  Ms. Jose Locke is a 82-year-old female with stage Ia melanoma here for continued monitoring, follow-up and surveillance. 1. Melanoma of back ([address])  2. Encounter for follow-up surveillance of melanoma  She is doing well and has no clinical evidence of melanoma recurrence or metastasis.   She continues with close follow-up with dermatology. She continues to practice safe sun practices. Labs reviewed and okay. We will continue monitoring for her every 6 months. She will return in 6 months for full body exam and blood work. All orders placed and prescription provided. She knows to call with issues or concerns prior to her next visit. - CBC and differential; Future  - Comprehensive metabolic panel; Future  - LD,Blood; Future        Return in about 6 months (around 1/12/2024) for Office Visit, labs.    Sherryle Clarke, MD, PhD

## 2023-09-13 ENCOUNTER — TELEPHONE (OUTPATIENT)
Dept: OBGYN CLINIC | Facility: MEDICAL CENTER | Age: 43
End: 2023-09-13

## 2023-10-09 ENCOUNTER — CONSULT (OUTPATIENT)
Dept: OBGYN CLINIC | Facility: MEDICAL CENTER | Age: 43
End: 2023-10-09

## 2023-10-09 VITALS — BODY MASS INDEX: 26.33 KG/M2 | WEIGHT: 163.1 LBS | SYSTOLIC BLOOD PRESSURE: 122 MMHG | DIASTOLIC BLOOD PRESSURE: 70 MMHG

## 2023-10-09 DIAGNOSIS — Z01.818 PREOP EXAMINATION: Primary | ICD-10-CM

## 2023-10-09 PROCEDURE — PREOP: Performed by: OBSTETRICS & GYNECOLOGY

## 2023-10-09 NOTE — PROGRESS NOTES
History & Physical - OB/GYN   Mindy Trammell 38 y.o. female MRN: [id number]  Unit/Bed#:  Encounter: [id number]      Chief complaint:  preop    HPI:  Ms Lizy Voss is a 38 y.o. [id number] presenting for preop exam for planned TLH, BS, cysto for AUB post-ablation. She is without complaint. Denies fever/chills, cp, sob, n/v/d, or new pelvic symptoms. She has a hx of D&C and endometrial ablation prior. Prior office closed and unable to access records. Reports normal pap hx and no hyperplasia/malignancy on D&C. The procedure was reviewed in detail with the patient. We reviewed the risks of the procedure include bleeding, infection, injury to bowel/bladder/ureters/neurovascular structures, or diagnosis of occult pathology. We discussed her prior hx of hernia with mesh repair; discussed may require additional laparoscopic sites and/or access via palmers point. We reviewed preop instructions, same-day surgery, recovery, and follow up. Patient had the opportunity to ask questions, which were answered to her satisfaction. Informed consent was obtained.          Active Problems:  Patient Active Problem List   Diagnosis    Melanoma of back [facility])         PMH:  Past Medical History:   Diagnosis Date    Abnormal Pap smear of cervix     Abnormal uterine bleeding (AUB)     Anemia     Arthritis     Asthma     Cancer ([address] [facility])     melanoma    Chronic pain disorder     back    COVID-19 08/2021    mild s/s-fully vaccinated    GERD (gastroesophageal reflux disease)     History of transfusion     Melanoma ([address]) 8/24/22    Pneumonia     as a child    Shortness of breath     allergies trigger/on exersion    Varicella     Wears contact lenses     Wears glasses        PSH:  Past Surgical History:   Procedure Laterality Date    APPENDECTOMY  2011    BREAST BIOPSY      CHOLECYSTECTOMY  2011    COLONOSCOPY      EGD      ENDOMETRIAL ABLATION N/A 2021    HERNIA REPAIR  0447    umbilical hernia    INTESTINAL MALROTATION REPAIR N/A 11/2015    MYOMECTOMY SKIN BIOPSY  22    SKIN LESION EXCISION N/A 10/07/2022    Procedure: WIDE EXCISION OF UPPER BACK MELANOMA;  Surgeon: Joe Wade MD;  Location: [facility];  Service: Surgical Oncology    SPINAL FUSION N/A 2005    with [place] shira-       Social Hx:  Social History     Tobacco Use    Smoking status: Never    Smokeless tobacco: Never   Vaping Use    Vaping Use: Never used   Substance Use Topics    Alcohol use: Not Currently     Alcohol/week: 2.0 standard drinks of alcohol     Types: 2 Glasses of wine per week     Comment: Not regularly    Drug use: Not Currently     Frequency: 1.0 times per week     Types: Marijuana     Comment: Medical Marijuana for pain         OB Hx:  OB History    Para Term  AB Living   2 2 2 0 0 2   SAB IAB Ectopic Multiple Live Births   0 0 0 0 2      # Outcome Date GA Lbr Eduar/2nd Weight Sex Delivery Anes PTL Lv   2 Term      Vag-Spont   CHLOÉ   1 Term      Vag-Spont   CHLOÉ       Meds:  Current Facility-Administered Medications on File Prior to Visit   Medication Dose Route Frequency Provider Last Rate Last Admin    [COMPLETED] ceFAZolin (ANCEF) IVPB (premix in dextrose) 2,000 mg 50 mL  2,000 mg Intravenous Once Gina Esquivel MD   2,000 mg at 10/19/23 1418     Current Outpatient Medications on File Prior to Visit   Medication Sig Dispense Refill    albuterol (PROVENTIL HFA,VENTOLIN HFA) 90 mcg/act inhaler Inhale 2 puffs every 6 (six) hours as needed for wheezing      budesonide-formoterol (SYMBICORT) 80-4.5 MCG/ACT inhaler Inhale 2 puffs 2 (two) times a day Rinse mouth after use.       cholecalciferol (VITAMIN D3) 1,000 units tablet Take 1,000 Units by mouth daily      cyclobenzaprine (FLEXERIL) 5 mg tablet Take 5 mg by mouth 3 (three) times a day as needed for muscle spasms      ferrous sulfate 325 (65 Fe) mg tablet Take 325 mg by mouth daily with breakfast      FLUoxetine (PROzac) 10 MG tablet Take 10 mg by mouth daily      gabapentin (NEURONTIN) 300 mg capsule Take 300 mg by mouth daily at bedtime      omeprazole (PriLOSEC) 20 mg delayed release capsule Take 20 mg by mouth if needed      ondansetron (ZOFRAN) 4 mg tablet Take 4 mg by mouth every 8 (eight) hours as needed for nausea or vomiting      pantoprazole (PROTONIX) 40 mg tablet Take 40 mg by mouth daily      magnesium gluconate (MAGONATE) 500 mg tablet Take 500 mg by mouth 2 (two) times a day         Allergies: Allergies   Allergen Reactions    Azithromycin Nausea Only and GI Intolerance           Physical Exam:  /70   Wt 74 kg (163 lb 1.6 oz)   BMI 26.33 kg/m²     Physical Exam  Constitutional:       General: She is not in acute distress. Appearance: Normal appearance. She is not ill-appearing, toxic-appearing or diaphoretic. Eyes:      General: No scleral icterus. Right eye: No discharge. Left eye: No discharge. Conjunctiva/sclera: Conjunctivae normal.   Cardiovascular:      Rate and Rhythm: Normal rate and regular rhythm. Heart sounds: Normal heart sounds. No murmur heard. No friction rub. Pulmonary:      Effort: Pulmonary effort is normal. No respiratory distress. Breath sounds: Normal breath sounds. No wheezing or rales. Abdominal:      General: There is no distension. Palpations: There is no mass. Tenderness: There is no abdominal tenderness. There is no guarding or rebound. Hernia: No hernia is present. Musculoskeletal:         General: No swelling. Skin:     General: Skin is warm and dry. Coloration: Skin is not jaundiced or pale. Findings: No bruising or erythema. Neurological:      Mental Status: She is alert. Psychiatric:         Mood and Affect: Mood normal.         Behavior: Behavior normal.         Thought Content: Thought content normal.         Judgment: Judgment normal.             Assessment:   38 y.o. [id number] presenting for preop exam for planned TLH, BS, cysto for AUB post-ablation. Plan:   1.  To OR as planned  2. NPO midnight preop  3.  Surgical scrub and ERAS protocol reviewed

## 2023-10-09 NOTE — H&P (VIEW-ONLY)
History & Physical - OB/GYN   Charmayne Gladden 38 y.o. female MRN: [id number]  Unit/Bed#:  Encounter: [id number]      Chief complaint:  preop    HPI:  Ms Yash Gandhi is a 38 y.o. [id number] presenting for preop exam for planned TLH, BS, cysto for AUB post-ablation. She is without complaint. Denies fever/chills, cp, sob, n/v/d, or new pelvic symptoms. She has a hx of D&C and endometrial ablation prior. Prior office closed and unable to access records. Reports normal pap hx and no hyperplasia/malignancy on D&C. The procedure was reviewed in detail with the patient. We reviewed the risks of the procedure include bleeding, infection, injury to bowel/bladder/ureters/neurovascular structures, or diagnosis of occult pathology. We discussed her prior hx of hernia with mesh repair; discussed may require additional laparoscopic sites and/or access via [place] point. We reviewed preop instructions, same-day surgery, recovery, and follow up. Patient had the opportunity to ask questions, which were answered to her satisfaction. Informed consent was obtained.          Active Problems:  Patient Active Problem List   Diagnosis    Melanoma of back [facility])         PMH:  Past Medical History:   Diagnosis Date    Abnormal Pap smear of cervix     Abnormal uterine bleeding (AUB)     Anemia     Arthritis     Asthma     Cancer ([address] [facility])     melanoma    Chronic pain disorder     back    COVID-19 08/2021    mild s/s-fully vaccinated    GERD (gastroesophageal reflux disease)     History of transfusion     Melanoma ([address]) 8/24/22    Pneumonia     as a child    Shortness of breath     allergies trigger/on exersion    Varicella     Wears contact lenses     Wears glasses        PSH:  Past Surgical History:   Procedure Laterality Date    APPENDECTOMY  2011    BREAST BIOPSY      CHOLECYSTECTOMY  2011    COLONOSCOPY      EGD      ENDOMETRIAL ABLATION N/A 2021    HERNIA REPAIR  7168    umbilical hernia    INTESTINAL MALROTATION REPAIR N/A 11/2015    MYOMECTOMY SKIN BIOPSY  22    SKIN LESION EXCISION N/A 10/07/2022    Procedure: WIDE EXCISION OF UPPER BACK MELANOMA;  Surgeon: Thelma Mahoney MD;  Location: [facility];  Service: Surgical Oncology    SPINAL FUSION N/A 2005    with [place] shira-       Social Hx:  Social History     Tobacco Use    Smoking status: Never    Smokeless tobacco: Never   Vaping Use    Vaping Use: Never used   Substance Use Topics    Alcohol use: Not Currently     Alcohol/week: 2.0 standard drinks of alcohol     Types: 2 Glasses of wine per week     Comment: Not regularly    Drug use: Not Currently     Frequency: 1.0 times per week     Types: Marijuana     Comment: Medical Marijuana for pain         OB Hx:  OB History    Para Term  AB Living   2 2 2 0 0 2   SAB IAB Ectopic Multiple Live Births   0 0 0 0 2      # Outcome Date GA Lbr Eduar/2nd Weight Sex Delivery Anes PTL Lv   2 Term      Vag-Spont   CHLOÉ   1 Term      Vag-Spont   CHLOÉ       Meds:  Current Facility-Administered Medications on File Prior to Visit   Medication Dose Route Frequency Provider Last Rate Last Admin    [COMPLETED] ceFAZolin (ANCEF) IVPB (premix in dextrose) 2,000 mg 50 mL  2,000 mg Intravenous Once Serenity Delgado MD   2,000 mg at 10/19/23 6194     Current Outpatient Medications on File Prior to Visit   Medication Sig Dispense Refill    albuterol (PROVENTIL HFA,VENTOLIN HFA) 90 mcg/act inhaler Inhale 2 puffs every 6 (six) hours as needed for wheezing      budesonide-formoterol (SYMBICORT) 80-4.5 MCG/ACT inhaler Inhale 2 puffs 2 (two) times a day Rinse mouth after use.       cholecalciferol (VITAMIN D3) 1,000 units tablet Take 1,000 Units by mouth daily      cyclobenzaprine (FLEXERIL) 5 mg tablet Take 5 mg by mouth 3 (three) times a day as needed for muscle spasms      ferrous sulfate 325 (65 Fe) mg tablet Take 325 mg by mouth daily with breakfast      FLUoxetine (PROzac) 10 MG tablet Take 10 mg by mouth daily      gabapentin (NEURONTIN) 300 mg capsule Take 300 mg by mouth daily at bedtime      omeprazole (PriLOSEC) 20 mg delayed release capsule Take 20 mg by mouth if needed      ondansetron (ZOFRAN) 4 mg tablet Take 4 mg by mouth every 8 (eight) hours as needed for nausea or vomiting      pantoprazole (PROTONIX) 40 mg tablet Take 40 mg by mouth daily      magnesium gluconate (MAGONATE) 500 mg tablet Take 500 mg by mouth 2 (two) times a day         Allergies: Allergies   Allergen Reactions    Azithromycin Nausea Only and GI Intolerance           Physical Exam:  /70   Wt 74 kg (163 lb 1.6 oz)   BMI 26.33 kg/m²     Physical Exam  Constitutional:       General: She is not in acute distress. Appearance: Normal appearance. She is not ill-appearing, toxic-appearing or diaphoretic. Eyes:      General: No scleral icterus. Right eye: No discharge. Left eye: No discharge. Conjunctiva/sclera: Conjunctivae normal.   Cardiovascular:      Rate and Rhythm: Normal rate and regular rhythm. Heart sounds: Normal heart sounds. No murmur heard. No friction rub. Pulmonary:      Effort: Pulmonary effort is normal. No respiratory distress. Breath sounds: Normal breath sounds. No wheezing or rales. Abdominal:      General: There is no distension. Palpations: There is no mass. Tenderness: There is no abdominal tenderness. There is no guarding or rebound. Hernia: No hernia is present. Musculoskeletal:         General: No swelling. Skin:     General: Skin is warm and dry. Coloration: Skin is not jaundiced or pale. Findings: No bruising or erythema. Neurological:      Mental Status: She is alert. Psychiatric:         Mood and Affect: Mood normal.         Behavior: Behavior normal.         Thought Content: Thought content normal.         Judgment: Judgment normal.             Assessment:   38 y.o. [id number] presenting for preop exam for planned TLH, BS, cysto for AUB post-ablation. Plan:   1.  To OR as planned  2. NPO midnight preop  3.  Surgical scrub and ERAS protocol reviewed

## 2023-10-10 ENCOUNTER — PATIENT MESSAGE (OUTPATIENT)
Dept: OBGYN CLINIC | Facility: MEDICAL CENTER | Age: 43
End: 2023-10-10

## 2023-10-10 DIAGNOSIS — Z01.818 PREOP EXAMINATION: Primary | ICD-10-CM

## 2023-10-10 RX ORDER — UREA 10 %
500 LOTION (ML) TOPICAL 2 TIMES DAILY
COMMUNITY

## 2023-10-10 NOTE — PRE-PROCEDURE INSTRUCTIONS
Pre-Surgery Instructions:   Medication Instructions    albuterol (PROVENTIL HFA,VENTOLIN HFA) 90 mcg/act inhaler Uses PRN- OK to take day of surgery    budesonide-formoterol (SYMBICORT) 80-4.5 MCG/ACT inhaler Take day of surgery. cholecalciferol (VITAMIN D3) 1,000 units tablet Stop taking 7 days prior to surgery. cyclobenzaprine (FLEXERIL) 5 mg tablet Uses PRN- DO NOT take day of surgery    ferrous sulfate 325 (65 Fe) mg tablet Stop taking 7 days prior to surgery. FLUoxetine (PROzac) 10 MG tablet Take day of surgery. gabapentin (NEURONTIN) 300 mg capsule Take night before surgery    magnesium gluconate (MAGONATE) 500 mg tablet Stop taking 7 days prior to surgery. omeprazole (PriLOSEC) 20 mg delayed release capsule Uses PRN- OK to take day of surgery    ondansetron (ZOFRAN) 4 mg tablet Take day of surgery. pantoprazole (PROTONIX) 40 mg tablet Take day of surgery. Pt verbalizes understanding of the following:    - avoid alcohol within 12hrs   - avoid marijuana within 24hrs    - Bathing instructions, has chg, neck down, no genitals  - No lotions, powders, sprays, deodorant, jewelry, body piercings, false lashes or make-up  - No shaving within 24hrs    - DO NOT EAT OR DRINK ANYTHING after midnight on the evening before your procedure including coffee, tea, gum or hard candy.    - ONLY SIPS OF WATER with your medications are allowed on the morning of your procedure.   - Avoid OTC non-directed Vit/ Suppl/ Herbals 7 days prior to surgery to ensure no drug interactions with perioperative surgical/ anesthetic meds  - Avoid NSAIDs 3 days prior  - Avoid ASA containing products 5 days prior  - Bring a list of meds you take at home with your last dose noted  - Bring INHALER you take for breathing problems     - Arrange for someone to drive you home after the procedure & stay with you until the next morning  - Bring insurance cards & photo id    - Bring a case for your glasses; remove contacts   - Leave all valuables such as credit cards, money & jewelry at home    - Notify surgeon if you develop any cold symptoms, change in your health history or develop a rash/ open wounds     - Did the surgeon's office give you any other special instructions? ERAS (has 1 drink)  - Did surgeon require any clearances?  No

## 2023-10-12 ENCOUNTER — TELEPHONE (OUTPATIENT)
Dept: OBGYN CLINIC | Facility: MEDICAL CENTER | Age: 43
End: 2023-10-12

## 2023-10-12 LAB — HBA1C MFR BLD HPLC: 5.3 %

## 2023-10-12 NOTE — TELEPHONE ENCOUNTER
OUTPATIENT CODE 73490 PRIOR AUTHORIZATION REQUIRED AND APPROVED. Authorization Detail: [id number] 10/19/23-4/15/24.

## 2023-10-19 ENCOUNTER — ANESTHESIA (OUTPATIENT)
Dept: PERIOP | Facility: HOSPITAL | Age: 43
End: 2023-10-19
Payer: COMMERCIAL

## 2023-10-19 ENCOUNTER — ANESTHESIA EVENT (OUTPATIENT)
Dept: PERIOP | Facility: HOSPITAL | Age: 43
End: 2023-10-19
Payer: COMMERCIAL

## 2023-10-19 ENCOUNTER — HOSPITAL ENCOUNTER (OUTPATIENT)
Facility: HOSPITAL | Age: 43
Setting detail: OUTPATIENT SURGERY
Discharge: HOME | End: 2023-10-19
Attending: OBSTETRICS & GYNECOLOGY | Admitting: OBSTETRICS & GYNECOLOGY
Payer: COMMERCIAL

## 2023-10-19 VITALS
TEMPERATURE: 97.4 F | WEIGHT: 163 LBS | DIASTOLIC BLOOD PRESSURE: 65 MMHG | HEART RATE: 91 BPM | BODY MASS INDEX: 26.2 KG/M2 | HEIGHT: 66 IN | RESPIRATION RATE: 18 BRPM | OXYGEN SATURATION: 98 % | SYSTOLIC BLOOD PRESSURE: 114 MMHG

## 2023-10-19 DIAGNOSIS — N93.9 ABNORMAL UTERINE BLEEDING (AUB): ICD-10-CM

## 2023-10-19 DIAGNOSIS — Z90.710 S/P LAPAROSCOPIC HYSTERECTOMY: Primary | ICD-10-CM

## 2023-10-19 LAB
ABO GROUP BLD: NORMAL
BASOPHILS # BLD AUTO: 0.01 THOUSANDS/ÂΜL (ref 0–0.1)
BASOPHILS NFR BLD AUTO: 0 % (ref 0–1)
BLD GP AB SCN SERPL QL: NEGATIVE
EOSINOPHIL # BLD AUTO: 0.12 THOUSAND/ÂΜL (ref 0–0.61)
EOSINOPHIL NFR BLD AUTO: 3 % (ref 0–6)
ERYTHROCYTE [DISTWIDTH] IN BLOOD BY AUTOMATED COUNT: 13 % (ref 11.6–15.1)
EXT PREGNANCY TEST URINE: NEGATIVE
EXT. CONTROL: NORMAL
HCT VFR BLD AUTO: 39.2 % (ref 34.8–46.1)
HGB BLD-MCNC: 12.6 G/DL (ref 11.5–15.4)
IMM GRANULOCYTES # BLD AUTO: 0.01 THOUSAND/UL (ref 0–0.2)
IMM GRANULOCYTES NFR BLD AUTO: 0 % (ref 0–2)
LYMPHOCYTES # BLD AUTO: 1.43 THOUSANDS/ÂΜL (ref 0.6–4.47)
LYMPHOCYTES NFR BLD AUTO: 32 % (ref 14–44)
MCH RBC QN AUTO: 31.7 PG (ref 26.8–34.3)
MCHC RBC AUTO-ENTMCNC: 32.1 G/DL (ref 31.4–37.4)
MCV RBC AUTO: 99 FL (ref 82–98)
MONOCYTES # BLD AUTO: 0.35 THOUSAND/ÂΜL (ref 0.17–1.22)
MONOCYTES NFR BLD AUTO: 8 % (ref 4–12)
NEUTROPHILS # BLD AUTO: 2.5 THOUSANDS/ÂΜL (ref 1.85–7.62)
NEUTS SEG NFR BLD AUTO: 57 % (ref 43–75)
NRBC BLD AUTO-RTO: 0 /100 WBCS
PLATELET # BLD AUTO: 211 THOUSANDS/UL (ref 149–390)
PMV BLD AUTO: 9.2 FL (ref 8.9–12.7)
RBC # BLD AUTO: 3.98 MILLION/UL (ref 3.81–5.12)
RH BLD: NEGATIVE
SPECIMEN EXPIRATION DATE: NORMAL
WBC # BLD AUTO: 4.42 THOUSAND/UL (ref 4.31–10.16)

## 2023-10-19 PROCEDURE — 86900 BLOOD TYPING SEROLOGIC ABO: CPT | Performed by: OBSTETRICS & GYNECOLOGY

## 2023-10-19 PROCEDURE — 88307 TISSUE EXAM BY PATHOLOGIST: CPT | Performed by: SPECIALIST

## 2023-10-19 PROCEDURE — 86850 RBC ANTIBODY SCREEN: CPT | Performed by: OBSTETRICS & GYNECOLOGY

## 2023-10-19 PROCEDURE — 81025 URINE PREGNANCY TEST: CPT | Performed by: OBSTETRICS & GYNECOLOGY

## 2023-10-19 PROCEDURE — 86901 BLOOD TYPING SEROLOGIC RH(D): CPT | Performed by: OBSTETRICS & GYNECOLOGY

## 2023-10-19 PROCEDURE — 58571 TLH W/T/O 250 G OR LESS: CPT | Performed by: OBSTETRICS & GYNECOLOGY

## 2023-10-19 PROCEDURE — 85025 COMPLETE CBC W/AUTO DIFF WBC: CPT | Performed by: OBSTETRICS & GYNECOLOGY

## 2023-10-19 RX ORDER — ROCURONIUM BROMIDE 10 MG/ML
INJECTION, SOLUTION INTRAVENOUS AS NEEDED
Status: DISCONTINUED | OUTPATIENT
Start: 2023-10-19 | End: 2023-10-19

## 2023-10-19 RX ORDER — IBUPROFEN 200 MG
600 TABLET ORAL EVERY 6 HOURS PRN
Status: DISCONTINUED | OUTPATIENT
Start: 2023-10-19 | End: 2023-10-19 | Stop reason: HOSPADM

## 2023-10-19 RX ORDER — SODIUM CHLORIDE, SODIUM LACTATE, POTASSIUM CHLORIDE, CALCIUM CHLORIDE 600; 310; 30; 20 MG/100ML; MG/100ML; MG/100ML; MG/100ML
125 INJECTION, SOLUTION INTRAVENOUS CONTINUOUS
Status: ACTIVE | OUTPATIENT
Start: 2023-10-19 | End: 2023-10-19

## 2023-10-19 RX ORDER — HYDROMORPHONE HCL/PF 1 MG/ML
0.2 SYRINGE (ML) INJECTION
Status: DISCONTINUED | OUTPATIENT
Start: 2023-10-19 | End: 2023-10-19 | Stop reason: HOSPADM

## 2023-10-19 RX ORDER — ONDANSETRON 2 MG/ML
INJECTION INTRAMUSCULAR; INTRAVENOUS AS NEEDED
Status: DISCONTINUED | OUTPATIENT
Start: 2023-10-19 | End: 2023-10-19

## 2023-10-19 RX ORDER — GLYCOPYRROLATE 0.2 MG/ML
INJECTION INTRAMUSCULAR; INTRAVENOUS AS NEEDED
Status: DISCONTINUED | OUTPATIENT
Start: 2023-10-19 | End: 2023-10-19

## 2023-10-19 RX ORDER — OXYCODONE HYDROCHLORIDE 5 MG/1
5 TABLET ORAL EVERY 4 HOURS PRN
Status: DISCONTINUED | OUTPATIENT
Start: 2023-10-19 | End: 2023-10-19 | Stop reason: HOSPADM

## 2023-10-19 RX ORDER — IBUPROFEN 600 MG/1
600 TABLET ORAL EVERY 6 HOURS PRN
Qty: 50 TABLET | Refills: 1 | Status: SHIPPED | OUTPATIENT
Start: 2023-10-19

## 2023-10-19 RX ORDER — FENTANYL CITRATE 50 UG/ML
INJECTION, SOLUTION INTRAMUSCULAR; INTRAVENOUS AS NEEDED
Status: DISCONTINUED | OUTPATIENT
Start: 2023-10-19 | End: 2023-10-19

## 2023-10-19 RX ORDER — ALBUTEROL SULFATE 90 UG/1
2 AEROSOL, METERED RESPIRATORY (INHALATION) EVERY 6 HOURS PRN
Status: DISCONTINUED | OUTPATIENT
Start: 2023-10-19 | End: 2023-10-19 | Stop reason: HOSPADM

## 2023-10-19 RX ORDER — ONDANSETRON 2 MG/ML
4 INJECTION INTRAMUSCULAR; INTRAVENOUS EVERY 6 HOURS PRN
Status: DISCONTINUED | OUTPATIENT
Start: 2023-10-19 | End: 2023-10-19 | Stop reason: HOSPADM

## 2023-10-19 RX ORDER — FENTANYL CITRATE 50 UG/ML
INJECTION, SOLUTION INTRAMUSCULAR; INTRAVENOUS
Status: DISCONTINUED
Start: 2023-10-19 | End: 2023-10-19 | Stop reason: HOSPADM

## 2023-10-19 RX ORDER — CALCIUM CARBONATE 500 MG/1
1000 TABLET, CHEWABLE ORAL 2 TIMES DAILY PRN
Status: DISCONTINUED | OUTPATIENT
Start: 2023-10-19 | End: 2023-10-19 | Stop reason: HOSPADM

## 2023-10-19 RX ORDER — KETOROLAC TROMETHAMINE 30 MG/ML
INJECTION, SOLUTION INTRAMUSCULAR; INTRAVENOUS AS NEEDED
Status: DISCONTINUED | OUTPATIENT
Start: 2023-10-19 | End: 2023-10-19

## 2023-10-19 RX ORDER — LIDOCAINE HYDROCHLORIDE 20 MG/ML
INJECTION, SOLUTION EPIDURAL; INFILTRATION; INTRACAUDAL; PERINEURAL AS NEEDED
Status: DISCONTINUED | OUTPATIENT
Start: 2023-10-19 | End: 2023-10-19

## 2023-10-19 RX ORDER — MIDAZOLAM HYDROCHLORIDE 2 MG/2ML
INJECTION, SOLUTION INTRAMUSCULAR; INTRAVENOUS AS NEEDED
Status: DISCONTINUED | OUTPATIENT
Start: 2023-10-19 | End: 2023-10-19

## 2023-10-19 RX ORDER — SODIUM CHLORIDE, SODIUM LACTATE, POTASSIUM CHLORIDE, CALCIUM CHLORIDE 600; 310; 30; 20 MG/100ML; MG/100ML; MG/100ML; MG/100ML
INJECTION, SOLUTION INTRAVENOUS CONTINUOUS PRN
Status: DISCONTINUED | OUTPATIENT
Start: 2023-10-19 | End: 2023-10-19

## 2023-10-19 RX ORDER — DEXAMETHASONE SODIUM PHOSPHATE 10 MG/ML
INJECTION, SOLUTION INTRAMUSCULAR; INTRAVENOUS AS NEEDED
Status: DISCONTINUED | OUTPATIENT
Start: 2023-10-19 | End: 2023-10-19

## 2023-10-19 RX ORDER — NEOSTIGMINE METHYLSULFATE 1 MG/ML
INJECTION INTRAVENOUS AS NEEDED
Status: DISCONTINUED | OUTPATIENT
Start: 2023-10-19 | End: 2023-10-19

## 2023-10-19 RX ORDER — BUPIVACAINE HYDROCHLORIDE 2.5 MG/ML
INJECTION, SOLUTION EPIDURAL; INFILTRATION; INTRACAUDAL AS NEEDED
Status: DISCONTINUED | OUTPATIENT
Start: 2023-10-19 | End: 2023-10-19 | Stop reason: HOSPADM

## 2023-10-19 RX ORDER — ACETAMINOPHEN 325 MG/1
975 TABLET ORAL EVERY 6 HOURS PRN
Status: DISCONTINUED | OUTPATIENT
Start: 2023-10-19 | End: 2023-10-19 | Stop reason: HOSPADM

## 2023-10-19 RX ORDER — CEFAZOLIN SODIUM 2 G/50ML
2000 SOLUTION INTRAVENOUS ONCE
Status: COMPLETED | OUTPATIENT
Start: 2023-10-19 | End: 2023-10-19

## 2023-10-19 RX ORDER — ONDANSETRON 2 MG/ML
4 INJECTION INTRAMUSCULAR; INTRAVENOUS ONCE AS NEEDED
Status: DISCONTINUED | OUTPATIENT
Start: 2023-10-19 | End: 2023-10-19 | Stop reason: HOSPADM

## 2023-10-19 RX ORDER — FENTANYL CITRATE/PF 50 MCG/ML
25 SYRINGE (ML) INJECTION
Status: DISCONTINUED | OUTPATIENT
Start: 2023-10-19 | End: 2023-10-19 | Stop reason: HOSPADM

## 2023-10-19 RX ORDER — PROPOFOL 10 MG/ML
INJECTION, EMULSION INTRAVENOUS CONTINUOUS PRN
Status: DISCONTINUED | OUTPATIENT
Start: 2023-10-19 | End: 2023-10-19

## 2023-10-19 RX ORDER — OXYCODONE HYDROCHLORIDE 5 MG/1
10 TABLET ORAL EVERY 4 HOURS PRN
Status: DISCONTINUED | OUTPATIENT
Start: 2023-10-19 | End: 2023-10-19 | Stop reason: HOSPADM

## 2023-10-19 RX ORDER — ACETAMINOPHEN 500 MG
1000 TABLET ORAL EVERY 8 HOURS PRN
Refills: 0
Start: 2023-10-19

## 2023-10-19 RX ORDER — PHENYLEPHRINE HCL IN 0.9% NACL 1 MG/10 ML
SYRINGE (ML) INTRAVENOUS AS NEEDED
Status: DISCONTINUED | OUTPATIENT
Start: 2023-10-19 | End: 2023-10-19

## 2023-10-19 RX ORDER — OXYCODONE HYDROCHLORIDE 5 MG/1
5 TABLET ORAL EVERY 6 HOURS PRN
Qty: 12 TABLET | Refills: 0 | Status: SHIPPED | OUTPATIENT
Start: 2023-10-19 | End: 2023-10-29

## 2023-10-19 RX ADMIN — ROCURONIUM BROMIDE 10 MG: 10 INJECTION, SOLUTION INTRAVENOUS at 08:15

## 2023-10-19 RX ADMIN — FENTANYL CITRATE 25 MCG: 50 INJECTION INTRAMUSCULAR; INTRAVENOUS at 09:37

## 2023-10-19 RX ADMIN — Medication 8 MCG: at 07:57

## 2023-10-19 RX ADMIN — OXYCODONE HYDROCHLORIDE 5 MG: 5 TABLET ORAL at 11:34

## 2023-10-19 RX ADMIN — Medication 100 MCG: at 07:44

## 2023-10-19 RX ADMIN — ONDANSETRON 4 MG: 2 INJECTION INTRAMUSCULAR; INTRAVENOUS at 09:49

## 2023-10-19 RX ADMIN — FENTANYL CITRATE 50 MCG: 50 INJECTION INTRAMUSCULAR; INTRAVENOUS at 07:36

## 2023-10-19 RX ADMIN — FENTANYL CITRATE 25 MCG: 50 INJECTION INTRAMUSCULAR; INTRAVENOUS at 09:00

## 2023-10-19 RX ADMIN — FENTANYL CITRATE 25 MCG: 50 INJECTION INTRAMUSCULAR; INTRAVENOUS at 10:15

## 2023-10-19 RX ADMIN — SODIUM CHLORIDE, SODIUM LACTATE, POTASSIUM CHLORIDE, AND CALCIUM CHLORIDE: .6; .31; .03; .02 INJECTION, SOLUTION INTRAVENOUS at 09:44

## 2023-10-19 RX ADMIN — Medication 4 MCG: at 09:40

## 2023-10-19 RX ADMIN — FENTANYL CITRATE 25 MCG: 50 INJECTION INTRAMUSCULAR; INTRAVENOUS at 10:40

## 2023-10-19 RX ADMIN — Medication 100 MCG: at 09:25

## 2023-10-19 RX ADMIN — Medication 8 MCG: at 08:18

## 2023-10-19 RX ADMIN — ROCURONIUM BROMIDE 10 MG: 10 INJECTION, SOLUTION INTRAVENOUS at 08:44

## 2023-10-19 RX ADMIN — Medication 100 MCG: at 07:52

## 2023-10-19 RX ADMIN — ROCURONIUM BROMIDE 10 MG: 10 INJECTION, SOLUTION INTRAVENOUS at 09:12

## 2023-10-19 RX ADMIN — Medication 100 MCG: at 08:12

## 2023-10-19 RX ADMIN — Medication 100 MCG: at 08:43

## 2023-10-19 RX ADMIN — NEOSTIGMINE METHYLSULFATE 3 MG: 1 INJECTION INTRAVENOUS at 09:57

## 2023-10-19 RX ADMIN — ROCURONIUM BROMIDE 10 MG: 10 INJECTION, SOLUTION INTRAVENOUS at 09:42

## 2023-10-19 RX ADMIN — FENTANYL CITRATE 25 MCG: 50 INJECTION INTRAMUSCULAR; INTRAVENOUS at 10:20

## 2023-10-19 RX ADMIN — IBUPROFEN 600 MG: 200 TABLET, FILM COATED ORAL at 12:02

## 2023-10-19 RX ADMIN — GLYCOPYRROLATE 0.4 MG: 0.2 INJECTION, SOLUTION INTRAMUSCULAR; INTRAVENOUS at 09:57

## 2023-10-19 RX ADMIN — MIDAZOLAM 2 MG: 1 INJECTION INTRAMUSCULAR; INTRAVENOUS at 07:29

## 2023-10-19 RX ADMIN — KETOROLAC TROMETHAMINE 30 MG: 30 INJECTION, SOLUTION INTRAMUSCULAR; INTRAVENOUS at 09:23

## 2023-10-19 RX ADMIN — DEXAMETHASONE SODIUM PHOSPHATE 8 MG: 10 INJECTION, SOLUTION INTRAMUSCULAR; INTRAVENOUS at 07:46

## 2023-10-19 RX ADMIN — PROPOFOL 170 MG: 10 INJECTION, EMULSION INTRAVENOUS at 07:36

## 2023-10-19 RX ADMIN — PROPOFOL 120 MCG/KG/MIN: 10 INJECTION, EMULSION INTRAVENOUS at 07:38

## 2023-10-19 RX ADMIN — SODIUM CHLORIDE, SODIUM LACTATE, POTASSIUM CHLORIDE, AND CALCIUM CHLORIDE: .6; .31; .03; .02 INJECTION, SOLUTION INTRAVENOUS at 07:18

## 2023-10-19 RX ADMIN — LIDOCAINE HYDROCHLORIDE 100 MG: 20 INJECTION, SOLUTION EPIDURAL; INFILTRATION; INTRACAUDAL; PERINEURAL at 07:36

## 2023-10-19 RX ADMIN — CEFAZOLIN SODIUM 2000 MG: 2 SOLUTION INTRAVENOUS at 07:29

## 2023-10-19 RX ADMIN — ROCURONIUM BROMIDE 50 MG: 10 INJECTION, SOLUTION INTRAVENOUS at 07:37

## 2023-10-19 NOTE — OP NOTE
OPERATIVE REPORT  PATIENT NAME: Imelda Serrano    :  1980  MRN: [id number]  Pt Location: [facility] OR ROOM 02    SURGERY DATE: 10/19/2023    Surgeon(s) and Role:     * Nereida Wiggisn MD - Primary     * Galen Azevedo MD - Fellow     * Kiesha Bueno MD - Resident     Preop Diagnosis:  Abnormal uterine bleeding (AUB) N93.9  History of endometrial ablation  Left ovarian cyst    Post-Op Diagnosis Codes:  Same  Endometriosis    Procedure(s): HYSTERECTOMY LAPAROSCOPIC TOTAL ([facility]) WITH BILATERAL SALPINGECTOMY   CYSTOSCOPY    Specimen(s):  ID Type Source Tests Collected by Time Destination   1 : uterus, cervix, and bilateral fallopian tubes Tissue Uterus TISSUE EXAM Nereida Wiggins MD 10/19/2023 0949        Estimated Blood Loss:   100 mL    Drains:  REMOVED NG/OG/Enteral Tube Orogastric 18 Fr (Removed)   Number of days: 0       REMOVED urethral Catheter Straight-tip 16 Fr. (Removed)   Number of days: 0       Anesthesia Type:   General endotracheal    Operative Indications:  Abnormal uterine bleeding (AUB) N93.9    Operative Findings: The external female genitalia was grossly normal.  Bimanual confirmed an anteverted, mobile uterus about 8 to 10 weeks in size. The cervix was normal and the uterus sounded to 8 cm. On laparoscopy, there was a band of omental adhesion to the anterior abdominal wall inferior to Campbell's point at the level of the previous umbilical hernia repair. There were some filmy adhesions of the sigmoid colon at the pelvic brim. The left ovary and fimbriated portion of the left fallopian tube were adherent to the left pelvic sidewall. The left fallopian tube was very dilated and abnormal appearing, likely secondary to  distention from endometriosis. There was a 3 to 4 cm simple appearing cyst of the left ovary. There was evidence of endometriosis along the left pelvic sidewall once the left ovary was mobilized.   The right ovary and fallopian tubes were grossly normal.  The external uterine contour was normal.  The vaginal cuff was closed laparoscopically with 2-0 PDS stratafix suture. All vascular pedicles were hemostatic at completion of the procedure. On cystoscopy, the bladder was confirmed to be intact with bilateral ureteral jets noted. Complications:   None apparent    Procedure and Technique:  The patient was correctly identified in preop holding. She was taken to the operating room. SCDs were applied to the lower extremities. General anesthesia was administered without difficulty. 2 g IV Ancef was administered for surgical prophylaxis. She was then positioned in dorsal lithotomy using yellofin Migel stirrups with the arms tucked. All pressure points were padded. The vagina was prepped with chlorhexidine. The abdomen was prepped with Chloraprep and appropriate drying time allowed. Campbell's point was marked for abdominal entry. Sterile drapes were applied. Time-out was performed with all parties in agreement. The anterior lip of the cervix was visualized and grasped with a single-tooth tenaculum. The uterus sounded to 8cm. The cervix was serially dilated to accommodate the TRACY uterine manipulator tip. A stay suture of 0-Vicryl was applied to the anterior lip of the cervix. The single-tooth tenaculum was removed and the TRACY uterine manipulator placed without incident. A banks catheter was inserted into the bladder and the vaginal occluder balloon inflated. Sterile gloves were exchanged and attention turned to the abdomen. Local anesthesia was infiltrated and previously marked Campbell's point. A 10 mm incision was made at the site. The laparoscope was inserted under direct visualization at this site. Pneumoperitoneum was established and maintained at 15mmHg. There was no evidence of injury directly below the trocar insertion site. Subsequently, after infiltrating the areas with local, two additional small incisions were made in the right and left lower quadrants.  Two 5mm ports were introduced under direct visualization at these sites. The Campbell's point trocar was visualized from the lower quadrant trocars using a 5 mm laparoscope. We confirmed that there were no loops of bowel in the omental adhesion to the abdominal wall just inferior to Campbell's point. There was no evidence of injury below the trocar insertion site as well. The patient was placed in trendelenburg. Findings of pelvic survey are as noted above. The Enseal was selected for cautery. The sigmoid colon adhesions at the level of the pelvic brim were lysed with the Enseal.  An attempt to utilize the transperitoneal course of the left ureter was unsuccessful. Due to the adhesions between the left fallopian tube and the sidewall, the proximal end of the fallopian tube was amputated from the uterine cornua. The left utero-ovarian and round ligaments were serially coagulated and cut. The broad ligament was then coagulated, cut and its anterior and posterior leaves  to initiate the bladder flap. The bladder was dissected approximately detention across the lower uterine segment. The left uterine artery was coagulated. Attention was then to to the contralateral side. The right fallopian tube was grasped at its fimbriated end and elevated. The Enseal was used to coagulate and cut the mesosalpinx. The fallopian tube was amputated 2cm from the uterine cornua and withdrawn from the abdominal cavity. The right round and utero-ovarian ligaments were then serially coagulated and cut. The broad ligament was then coagulated, cut and its anterior and posterior leaves  to complete the bladder flap from the right side. The bladder flap was completed and the bladder pushed down to below the level of the Koh cervical cup. The bilateral uterine arteries and the uterosacral cardinal ligament complexes were coagulated and cut.  The colpotomy was performed with the monopolar J-hook with the Enseal used laterally near the vascular pedicles to ensure hemostasis. The mesosalpinx of the left fallopian tube was then coagulated and cut to excise the fallopian tube. Once the colpotomy was completed, the attention was then turned to the perineum where the vaginal cuff occluder was deflated and the remaining specimens removed vaginally. All specimens (uterus, cervix, bilateral fallopian tubes) were sent for pathological analysis. A 2-0 PDS STRATAFIX suture was then introduced vaginally and a sterile glove with a lap sponge was placed in the vagina to reestablish pneumoperitoneum. The vaginal cuff was closed laparoscopically with this suture and 2 back stitches were placed. Hemostasis was achieved along the vaginal cuff. The Enseal was used to seal and oozing pedicle at the level of the right uterosacral ligament. The pelvis was then thoroughly irrigated and suctioned. The monopolar hook was used to seal an area of oozing ovary the left ovary where the mesosalpinx of the fallopian tube had previously been coagulated and cut. There was and hemostasis confirmed of all vascular pedicles under reduced intra-abdominal pressure. The STRATAFIX suture needle was withdrawn from the abdominal cavity. Pneumoperitoneum was then evacuated and the trocars and laparoscope were removed. Cystoscopy was completed and confirmed that the bladder was intact with bilateral ureteral jets visualized. The fascia of the 10 mm Campbell's point trocar was closed with an interrupted stitch of 0-Vicryl. Remaining skin incisions were closed with 4-0 Monocryl. The bladder was ranged of cystoscopy fluid with the Cortes catheter and this was later removed. Patient was  extubated and transferred to the recovery room in stable condition. She tolerated the procedure well. All needle, sponge, and instrument counts were noted to be correct x 2 at the end of the procedure. Dr. Corinne Chandler was present for the entire procedure.     Patient Disposition:  PACU SIGNATURE: Thu Roland MD  DATE: October 19, 2023  TIME: 10:11 AM

## 2023-10-19 NOTE — INTERVAL H&P NOTE
H&P reviewed. After examining the patient I find no changes in the patients condition since the H&P had been written.     Vitals:    10/19/23 0655   BP: 119/73   Pulse: 93   Resp: 16   Temp: 97.7 °F (36.5 °C)   SpO2: 100%

## 2023-10-19 NOTE — ANESTHESIA POSTPROCEDURE EVALUATION
Post-Op Assessment Note    CV Status:  Stable  Pain Score: 0    Pain management: adequate     Mental Status:  Sleepy   Hydration Status:  Stable   PONV Controlled:  None   Airway Patency:  Patent      Post Op Vitals Reviewed: Yes      Staff: CRNA     No notable events documented.     BP   107/63   Temp   97.4   Pulse  62   Resp   15   SpO2   98%

## 2023-10-19 NOTE — ANESTHESIA PREPROCEDURE EVALUATION
Procedure:  HYSTERECTOMY LAPAROSCOPIC TOTAL ([facility]) WITH BILATERAL SALPINGECTOMY  (Abdomen)  CYSTOSCOPY (Bladder)    Relevant Problems   No relevant active problems        Physical Exam    Airway    Mallampati score: II  TM Distance: >3 FB  Neck ROM: full     Dental       Cardiovascular      Pulmonary      Other Findings        Anesthesia Plan  ASA Score- 1     Anesthesia Type- general with ASA Monitors. Additional Monitors:     Airway Plan: ETT. Plan Factors-Exercise tolerance (METS): >4 METS. Chart reviewed. Patient is not a current smoker. Patient did not smoke on day of surgery. Obstructive sleep apnea risk education given perioperatively. Induction- intravenous. Postoperative Plan- Plan for postoperative opioid use. Planned trial extubation    Informed Consent- Anesthetic plan and risks discussed with patient. I personally reviewed this patient with the CRNA. Discussed and agreed on the Anesthesia Plan with the CRNA. Maximilian Sarmiento Anesthesia plan and consent discussed with Babar Potter who expressed understanding and agreement. Risks/benefits and alternatives discussed with patient including possible PONV, sore throat, damage to teeth/lips/gums and possibility of rare anesthetic and surgical emergencies.

## 2023-10-24 PROCEDURE — 88307 TISSUE EXAM BY PATHOLOGIST: CPT | Performed by: SPECIALIST

## 2023-11-01 ENCOUNTER — OFFICE VISIT (OUTPATIENT)
Dept: OBGYN CLINIC | Facility: CLINIC | Age: 43
End: 2023-11-01

## 2023-11-01 VITALS
SYSTOLIC BLOOD PRESSURE: 120 MMHG | WEIGHT: 167 LBS | HEIGHT: 66 IN | BODY MASS INDEX: 26.84 KG/M2 | DIASTOLIC BLOOD PRESSURE: 72 MMHG

## 2023-11-01 DIAGNOSIS — Z09 POSTOPERATIVE EXAMINATION: Primary | ICD-10-CM

## 2023-11-01 DIAGNOSIS — Z90.710 S/P LAPAROSCOPIC HYSTERECTOMY: ICD-10-CM

## 2023-11-01 PROCEDURE — 99024 POSTOP FOLLOW-UP VISIT: CPT | Performed by: OBSTETRICS & GYNECOLOGY

## 2023-11-13 ENCOUNTER — TELEPHONE (OUTPATIENT)
Dept: OBGYN CLINIC | Facility: CLINIC | Age: 43
End: 2023-11-13

## 2023-11-16 ENCOUNTER — TELEPHONE (OUTPATIENT)
Dept: OBGYN CLINIC | Facility: CLINIC | Age: 43
End: 2023-11-16

## 2023-11-16 NOTE — TELEPHONE ENCOUNTER
Message sent to patient notifying disability forms are completed and ready for . We will fax when we receive form payment.

## 2023-12-22 ENCOUNTER — OFFICE VISIT (OUTPATIENT)
Dept: OBGYN CLINIC | Facility: CLINIC | Age: 43
End: 2023-12-22

## 2023-12-22 VITALS
SYSTOLIC BLOOD PRESSURE: 118 MMHG | WEIGHT: 174.6 LBS | HEIGHT: 66 IN | DIASTOLIC BLOOD PRESSURE: 70 MMHG | BODY MASS INDEX: 28.06 KG/M2

## 2023-12-22 DIAGNOSIS — Z09 POSTOPERATIVE EXAMINATION: Primary | ICD-10-CM

## 2023-12-22 DIAGNOSIS — Z90.710 S/P LAPAROSCOPIC HYSTERECTOMY: ICD-10-CM

## 2023-12-22 PROCEDURE — 99024 POSTOP FOLLOW-UP VISIT: CPT | Performed by: OBSTETRICS & GYNECOLOGY

## 2023-12-22 NOTE — PROGRESS NOTES
"Assessment:  43 y.o.  who is POD#9wk from [facility], BS for AUB.     Plan:  Diagnoses and all orders for this visit:    Postoperative examination  S/P laparoscopic hysterectomy    - Routine postop care  - Anticipatory guidance given  - Discussed spotting. Benign exam, no granulation/erythema/polyps. No reproducible on cuff manipulation. Advised to observe over next month and return if persists after this point.   - Return in 6-9mo for yearly    __________________________________________________________________    Subjective   Shira Monte is a 43 y.o.  who presents POD#9wk from [facility],  for AUB.     Patient reports doing well overall. Has resumed sexual activity at around 7wk postop. Had spotting after first few times. Very light and with wiping only. No significant pain or discomfort.  Postop pain is resolved; not requiring medication.  Her incision is healing well; she denies redness or drainage.  She has  returned to most of her normal activities.       Objective  /70   Ht 5' 6\" (1.676 m)   Wt 79.2 kg (174 lb 9.6 oz)   LMP 10/08/2023 Comment: aub ongoing since   BMI 28.18 kg/m²      Physical Exam:  Physical Exam  Constitutional:       General: She is not in acute distress.     Appearance: Normal appearance. She is not ill-appearing, toxic-appearing or diaphoretic.   Eyes:      General: No scleral icterus.        Right eye: No discharge.         Left eye: No discharge.      Conjunctiva/sclera: Conjunctivae normal.   Cardiovascular:      Rate and Rhythm: Normal rate.   Pulmonary:      Effort: Pulmonary effort is normal. No respiratory distress.   Abdominal:      General: There is no distension.      Palpations: There is no mass.      Tenderness: There is no abdominal tenderness. There is no guarding or rebound.      Hernia: No hernia is present.      Comments: Incisions well healed. Clean, dry, and intact. No erythema or drainage   Genitourinary:     General: Normal vulva.      Exam position: " Lithotomy position.      Labia:         Right: No rash, tenderness or lesion.         Left: No rash, tenderness or lesion.       Urethra: No prolapse, urethral swelling or urethral lesion.      Vagina: No signs of injury. No vaginal discharge, erythema, tenderness or bleeding.      Cervix: No cervical motion tenderness (surgically absent cervix. intact, normal appearing vaginal cuff without erythema or lesions).   Musculoskeletal:         General: No swelling.   Skin:     General: Skin is warm and dry.      Coloration: Skin is not jaundiced or pale.      Findings: No bruising or erythema.   Neurological:      Mental Status: She is alert.   Psychiatric:         Mood and Affect: Mood normal.         Behavior: Behavior normal.         Thought Content: Thought content normal.         Judgment: Judgment normal.

## 2024-01-23 ENCOUNTER — OFFICE VISIT (OUTPATIENT)
Dept: RHEUMATOLOGY | Facility: CLINIC | Age: 44
End: 2024-01-23
Payer: COMMERCIAL

## 2024-01-23 VITALS
BODY MASS INDEX: 27.16 KG/M2 | HEIGHT: 66 IN | DIASTOLIC BLOOD PRESSURE: 78 MMHG | WEIGHT: 169 LBS | SYSTOLIC BLOOD PRESSURE: 120 MMHG

## 2024-01-23 DIAGNOSIS — M25.471 RIGHT ANKLE SWELLING: ICD-10-CM

## 2024-01-23 DIAGNOSIS — M79.89 FINGER SWELLING: ICD-10-CM

## 2024-01-23 DIAGNOSIS — R76.8 ANA POSITIVE: Primary | ICD-10-CM

## 2024-01-23 PROCEDURE — 99245 OFF/OP CONSLTJ NEW/EST HI 55: CPT | Performed by: STUDENT IN AN ORGANIZED HEALTH CARE EDUCATION/TRAINING PROGRAM

## 2024-01-23 RX ORDER — ONDANSETRON 4 MG/1
TABLET, ORALLY DISINTEGRATING ORAL
COMMUNITY
Start: 2023-12-15

## 2024-01-23 NOTE — PATIENT INSTRUCTIONS
I have a fairly high suspicion that we will end up treating you for systemic sclerosis    Please get blood work and x-rays    Please take pictures of your rashes and finger puffiness and we can look at them at your next appointment

## 2024-01-23 NOTE — PROGRESS NOTES
Rheumatology Outpatient Consult Note  1/23/2024 10:50 am       Referral Self  No address on file    Reason for referral: positive SCOTT and joint stiffness of multiple joints     Assessment: 43 y.o. female referred for the above with past medical history of iron deficiency anemia and melanoma of back who presents with joint stiffness involving bilateral pip and dip, bilateral elbows, and bilateral ankles with associated symptoms of swelling and erythema involving all of her digits on her hands, color changes of hands consistent with raynaud's, ocassional rash across face and chest, and continual swelling of right ankle without injury. Stiffness and achy feeling improves with activity and worsens with rest. On physical exam, visible swelling of right lateral ankle without erythema or tenderness as well as tenderness to palpation of left fifth digit MCP. Previously tested for lyme disease which was negative and rheumatoid factor negative. Given hx and physical exam findings in setting of positive SCOTT, suspicion for connective tissue disease, specifically systemic sclerosis at this time.   Encounter Diagnosis     ICD-10-CM    1. SCOTT positive  R76.8 Centromere Antibody     Anti-scleroderma antibody     Anti-U1 RNP Ab (RDL)     U3 RNP     RNA Polymerase III Ab     Cyclic citrul peptide antibody, IgG     Comprehensive metabolic panel     CBC and differential     Sedimentation rate, automated     C-reactive protein      2. Finger swelling  M79.89 Centromere Antibody     Anti-scleroderma antibody     Anti-U1 RNP Ab (RDL)     U3 RNP     RNA Polymerase III Ab     XR foot 3+ vw left     XR foot 3+ vw right     XR hand 3+ vw right     XR hand 3+ vw left      3. Right ankle swelling  M25.471 HLA-B27 antigen     XR ankle 3+ vw right     XR ankle 3+ vw left          Plan:  - Xray of bilateral hand, foot, ankle   - Lab work up to evaluate for connective tissue disease as well as inflammatory markers. Will also order CCP antibody to  "rule out RA. Will order HLA- B27 to rule out spondyloarthropathies given hx of right ankle swelling.   - Will follow up after lab workup and imaging completed in 4-6 weeks.      History: Shira Monte is a(n) 43 y.o. female who is referred for the above.     Patient states that she has been experiencing joint stiffness and \"achy\" feeling off and on for several years. She states that over the last several months the symptoms have been constant. She endorses stiffness of the joints in her fingers (especially her left fifth digit), bilateral elbows, and both ankles. Patient states the stiffness is worse first thing in the morning and lasts for around 45 minutes. She states she notices the less active she is the worse her symptoms are and symptoms improve with movement. She has tried  ibuprofen and volatern cream. Neither are helping. Not taking it scheduled jsut taking when unbearable. Hasn't been on steroids recently.swelling in ankle and fingers. Fingers get really red and swollen, couple mins to an hour. She also notes that she gets swelling and redness of her bilateral digits occasionally but cannot recall how often this occurs. Patient states she will get skin colors when its cold, pale or purple, that are painful upon warming. She states there is a distinct line on her fingers when this occurs. Occasionally gets rash on her cheek and necks but denies rash on her shoulders. She also endorses dry eyes occasionally that is relieved by eye drops. Denies eye pain, eye inflammation, redness of eyes, dry mouth, vaginal dryness, chest pain, dyspnea, fevers, chills, unintentional weight loss, photo sensitivity, recurrent pregnancy loss. Patient has had two pregnancies that resulted in full term delivery. Has family history of maternal aunt and mother diagnosed with lupus.       Past Medical History:   Diagnosis Date    Abnormal Pap smear of cervix     Abnormal uterine bleeding (AUB)     Anemia     Arthritis     Asthma     " Cancer (HCC)     melanoma    Chronic pain disorder     back    COVID-19 08/2021    mild s/s-fully vaccinated    GERD (gastroesophageal reflux disease)     History of transfusion     Melanoma (HCC) 8/24/22    Pneumonia     as a child    Shortness of breath     allergies trigger/on exersion    Varicella     Wears contact lenses     Wears glasses        Past Surgical History:   Procedure Laterality Date    APPENDECTOMY  2011    BREAST BIOPSY      CHOLECYSTECTOMY  2011    COLONOSCOPY      EGD      ENDOMETRIAL ABLATION N/A 2021    HERNIA REPAIR  2011    umbilical hernia    INTESTINAL MALROTATION REPAIR N/A 11/2015    MYOMECTOMY      ND CYSTOURETHROSCOPY N/A 10/19/2023    Procedure: CYSTOSCOPY;  Surgeon: Montse Smart MD;  Location: [facility];  Service: Gynecology    ND LAPS TOTAL HYSTERECT 250 GM/< W/RMVL TUBE/OVARY N/A 10/19/2023    Procedure: HYSTERECTOMY LAPAROSCOPIC TOTAL (LTH) WITH BILATERAL SALPINGECTOMY ;  Surgeon: Montse Smart MD;  Location: [facility];  Service: Gynecology    SKIN BIOPSY  8/11/22    SKIN LESION EXCISION N/A 10/07/2022    Procedure: WIDE EXCISION OF UPPER BACK MELANOMA;  Surgeon: Joy Gallagher MD;  Location: [facility];  Service: Surgical Oncology    SPINAL FUSION N/A 11/2005    with Liset silva-       Outpatient Medications Marked as Taking for the 1/23/24 encounter (Office Visit) with Amaury Quintanilla,    Medication    albuterol (PROVENTIL HFA,VENTOLIN HFA) 90 mcg/act inhaler    budesonide-formoterol (SYMBICORT) 80-4.5 MCG/ACT inhaler    cholecalciferol (VITAMIN D3) 1,000 units tablet    cyclobenzaprine (FLEXERIL) 5 mg tablet    ferrous sulfate 325 (65 Fe) mg tablet    FLUoxetine (PROzac) 10 MG tablet    gabapentin (NEURONTIN) 300 mg capsule    magnesium gluconate (MAGONATE) 500 mg tablet    omeprazole (PriLOSEC) 20 mg delayed release capsule    ondansetron (ZOFRAN) 4 mg tablet    pantoprazole (PROTONIX) 40 mg tablet       Allergies as of 01/23/2024 - Reviewed 01/23/2024  "  Allergen Reaction Noted    Azithromycin GI Intolerance and Nausea Only 09/12/2022       Family History   Problem Relation Age of Onset    Hypertension Mother     Diverticulosis Mother     Irritable bowel syndrome Mother     Heart attack Mother     Osteoarthritis Mother     Osteoporosis Mother     Diabetes Father     Hypertension Father     Asthma Father     Heart attack Father     Heart disease Father     Colon cancer Maternal Grandmother     Cancer Maternal Grandmother     Cancer Maternal Grandfather         Lung cancer    Diabetes Paternal Grandfather     Cancer Maternal Aunt         Skin - melanoma    Cancer Maternal Uncle         Skin - melanoma    Cancer Maternal Uncle         Skin - melanoma    Colon cancer Other     Breast cancer Paternal Aunt 40 - 49       Social History:  Social History     Tobacco Use    Smoking status: Never    Smokeless tobacco: Never   Vaping Use    Vaping status: Never Used   Substance Use Topics    Alcohol use: Not Currently     Alcohol/week: 2.0 standard drinks of alcohol     Types: 2 Glasses of wine per week     Comment: Not regularly    Drug use: Not Currently     Frequency: 1.0 times per week     Types: Marijuana     Comment: Medical Marijuana for pain       Review of Systems: Pertinent findings documented in HPI    ___________________________________    Physical Exam:    /78   Ht 5' 6\" (1.676 m)   Wt 76.7 kg (169 lb)   LMP 10/08/2023 Comment: aub ongoing since june  BMI 27.28 kg/m²     General: Well appearing, in no distress.   Eyes: EOMI  HENT: No oral ulcers. MMM.   Heart: Regular rate and rhythm, no murmurs.  Lungs: Lungs clear bilaterally without wheezes or crackles.   Extremities: Warm, well perfused, no edema.   Neuro: Alert and oriented.  Skin: No rashes.  Musculoskeletal exam: tenderness to palpation of left fifth digit MCP, visible swelling or right lateral ankle. Strength 5/5 of UE and LE. No muscle tenderness to palpation of UE or LE. "   ____________________________    Lab Result Review: relevant labs reviewed  - SCOTT (10/9/23): 1:320  - CRP (10/9/23): 23.6  - RF and sed rate wnl (10/9/23)

## 2024-02-07 ENCOUNTER — TELEPHONE (OUTPATIENT)
Dept: RHEUMATOLOGY | Facility: CLINIC | Age: 44
End: 2024-02-07

## 2024-02-23 ENCOUNTER — OFFICE VISIT (OUTPATIENT)
Dept: RHEUMATOLOGY | Facility: CLINIC | Age: 44
End: 2024-02-23

## 2024-02-23 VITALS
WEIGHT: 167 LBS | HEIGHT: 66 IN | SYSTOLIC BLOOD PRESSURE: 124 MMHG | BODY MASS INDEX: 26.84 KG/M2 | DIASTOLIC BLOOD PRESSURE: 84 MMHG

## 2024-02-23 DIAGNOSIS — M35.9 UNDIFFERENTIATED CONNECTIVE TISSUE DISEASE (HCC): ICD-10-CM

## 2024-02-23 DIAGNOSIS — M19.90 INFLAMMATORY ARTHRITIS: Primary | ICD-10-CM

## 2024-02-23 RX ORDER — FOLIC ACID 1 MG/1
1 TABLET ORAL DAILY
Qty: 90 TABLET | Refills: 2 | Status: SHIPPED | OUTPATIENT
Start: 2024-02-23

## 2024-02-23 RX ORDER — METHOTREXATE 2.5 MG/1
TABLET ORAL
Qty: 42 TABLET | Refills: 3 | Status: SHIPPED | OUTPATIENT
Start: 2024-02-23

## 2024-02-23 RX ORDER — MELOXICAM 15 MG/1
15 TABLET ORAL DAILY
Qty: 30 TABLET | Refills: 2 | Status: SHIPPED | OUTPATIENT
Start: 2024-02-23

## 2024-02-23 NOTE — PATIENT INSTRUCTIONS
Please start methotrexate and meloxicam    Please get blood work (CBC, CMP) 2 and 4 weeks after starting methotrexate, then every 3 months

## 2024-02-23 NOTE — PROGRESS NOTES
Rheumatology Follow-up Visit  2/23/2024     CC: routine follow up     Permanent History: First saw me Jan 2024 for joint stiffness for many years and worsening, intermittent finger puffiness worse in the mornings, SCOTT 1:320. Given symptoms was concerning for possible early inflammatory arthritis and/or early SSc.    Assessment: 43 y.o. female here for/with the above.    With finger puffiness and Raynaud's and + SCOTT, still suspicious for possible early SSc despite negative ENAs (which are not necessary for the dx anyway but help with prognostication)    Still has inflammatory-sounding joint stiffness    Rashes that she sent photos of don't look classically lupus-like, photos of her hands do show obvious finger puffiness    Discussed options; with ?SSc and likely inflammatory arthritis I think that methotrexate is a good starting option. Discussed r:b and lab monitoring, she is amenable to try    Patient's rheumatologic disease(s) threaten long-term function if not appropriately treated.    Patient's rheumatologic medication(s) require intensive monitoring for toxicity.    Encounter Diagnosis     ICD-10-CM    1. Inflammatory arthritis  M19.90 CBC and differential     Comprehensive metabolic panel     CBC and differential     Comprehensive metabolic panel     methotrexate 2.5 MG tablet     folic acid (KP Folic Acid) 1 mg tablet     meloxicam (Mobic) 15 mg tablet      2. Undifferentiated connective tissue disease (HCC)  M35.9           Plan:  -Start methotrexate/folic acid and high risk lab monitoring    Amaury Quintanilla DO, JOSÉ LUIS Quintanilla DO, JOSÉ LUIS MATOS Rheumatology      Interval History: still gets swelling in the fingers some mornings, off and on    Still has Raynaud's    Still gets rashes on the sides of thighs sometimes    Joint stiffness still worse in the mornings, takes 30-45 min to start getting better    Outpatient Medications Marked as Taking for the 2/23/24 encounter (Office Visit) with Amaury  "Socorro, DO   Medication    albuterol (PROVENTIL HFA,VENTOLIN HFA) 90 mcg/act inhaler    budesonide-formoterol (SYMBICORT) 80-4.5 MCG/ACT inhaler    cholecalciferol (VITAMIN D3) 1,000 units tablet    cyclobenzaprine (FLEXERIL) 5 mg tablet    ferrous sulfate 325 (65 Fe) mg tablet    FLUoxetine (PROzac) 10 MG tablet    folic acid (KP Folic Acid) 1 mg tablet    gabapentin (NEURONTIN) 300 mg capsule    magnesium gluconate (MAGONATE) 500 mg tablet    meloxicam (Mobic) 15 mg tablet    methotrexate 2.5 MG tablet    omeprazole (PriLOSEC) 20 mg delayed release capsule    ondansetron (ZOFRAN) 4 mg tablet    pantoprazole (PROTONIX) 40 mg tablet       Social History     Tobacco Use    Smoking status: Never    Smokeless tobacco: Never   Vaping Use    Vaping status: Never Used   Substance Use Topics    Alcohol use: Not Currently     Alcohol/week: 2.0 standard drinks of alcohol     Types: 2 Glasses of wine per week     Comment: Not regularly    Drug use: Not Currently     Frequency: 1.0 times per week     Types: Marijuana     Comment: Medical Marijuana for pain       Review of Systems:  Pertinent findings documented in HPI  ___________________________________    Physical Exam:    /84   Ht 5' 6\" (1.676 m)   Wt 75.8 kg (167 lb)   LMP 10/08/2023 Comment: aub ongoing since june  BMI 26.95 kg/m²     General: Well appearing, in no distress.   Eyes: Sclera non-icteric. EOMI  HENT: No oral ulcers. MMM.   Extremities: Warm, well perfused, no edema.   Neuro: Alert and oriented. No gross focal neurological deficits.   Skin: No rashes.some mild erythema of chest  MSK exam: tenderness to palpation somewhat in hands  ____________________________    Lab Results: relevant labs reviewed  Negative  RNApolIII Ab  HLA-B27  U3 RNP Ab  Centromere Ab  SCL-70 Ab  Sm/RPN Ab  ACPA    CMP, CBC unremarkable  CRP    Imaging Results: relevant images reviewed  "

## 2024-03-28 ENCOUNTER — TELEPHONE (OUTPATIENT)
Dept: RHEUMATOLOGY | Facility: CLINIC | Age: 44
End: 2024-03-28

## 2024-04-02 ENCOUNTER — TELEPHONE (OUTPATIENT)
Dept: HEMATOLOGY ONCOLOGY | Facility: CLINIC | Age: 44
End: 2024-04-02

## 2024-04-02 NOTE — TELEPHONE ENCOUNTER
Appointment Schedule   Who are you speaking with? Patient   If it is not the patient, are they listed on an active communication consent form? N/A   Which provider is the appointment scheduled with? Dr. Rick   At which location is the appointment scheduled for? [facility]   When is the appointment scheduled?  Please list date and time 4/24/24 240   What is the reason for this appointment? F/u   Did patient voice understanding of the details of this appointment? Yes   Was the no show policy reviewed with patient? Yes

## 2024-04-09 ENCOUNTER — TELEPHONE (OUTPATIENT)
Age: 44
End: 2024-04-09

## 2024-04-09 LAB
ALBUMIN SERPL-MCNC: 4.6 G/DL (ref 3.5–5.7)
ALP SERPL-CCNC: 48 U/L (ref 35–120)
ALT SERPL-CCNC: 13 U/L
ANION GAP SERPL CALCULATED.3IONS-SCNC: 9 MMOL/L (ref 3–11)
AST SERPL-CCNC: 14 U/L
BASOPHILS # BLD AUTO: 0 THOU/CMM (ref 0–0.1)
BASOPHILS NFR BLD AUTO: 1 %
BILIRUB SERPL-MCNC: 0.5 MG/DL (ref 0.2–1)
BUN SERPL-MCNC: 12 MG/DL (ref 7–25)
CALCIUM SERPL-MCNC: 9.6 MG/DL (ref 8.5–10.1)
CHLORIDE SERPL-SCNC: 105 MMOL/L (ref 100–109)
CO2 SERPL-SCNC: 25 MMOL/L (ref 21–31)
CREAT SERPL-MCNC: 0.69 MG/DL (ref 0.4–1.1)
CYTOLOGY CMNT CVX/VAG CYTO-IMP: NORMAL
DIFFERENTIAL METHOD BLD: NORMAL
EOSINOPHIL # BLD AUTO: 0.1 THOU/CMM (ref 0–0.5)
EOSINOPHIL NFR BLD AUTO: 2 %
ERYTHROCYTE [DISTWIDTH] IN BLOOD BY AUTOMATED COUNT: 15.6 % (ref 12–16)
GFR/BSA.PRED SERPLBLD CYS-BASED-ARV: 112 ML/MIN/{1.73_M2}
GLUCOSE SERPL-MCNC: 84 MG/DL (ref 65–99)
HCT VFR BLD AUTO: 41 % (ref 35–43)
HGB BLD-MCNC: 13.9 G/DL (ref 11.5–14.5)
LYMPHOCYTES # BLD AUTO: 1.5 THOU/CMM (ref 1–3)
LYMPHOCYTES NFR BLD AUTO: 26 %
MCH RBC QN AUTO: 32.8 PG (ref 26–34)
MCHC RBC AUTO-ENTMCNC: 33.9 G/DL (ref 32–37)
MCV RBC AUTO: 97 FL (ref 80–100)
MONOCYTES # BLD AUTO: 0.3 THOU/CMM (ref 0.3–1)
MONOCYTES NFR BLD AUTO: 6 %
NEUTROPHILS # BLD AUTO: 4 THOU/CMM (ref 1.8–7.8)
NEUTROPHILS NFR BLD AUTO: 65 %
PLATELET # BLD AUTO: 233 THOU/CMM (ref 140–350)
PMV BLD REES-ECKER: 8.5 FL (ref 7.5–11.3)
POTASSIUM SERPL-SCNC: 4.2 MMOL/L (ref 3.5–5.2)
PROT SERPL-MCNC: 7.2 G/DL (ref 6.3–8.3)
RBC # BLD AUTO: 4.25 MILL/CMM (ref 3.7–4.7)
SODIUM SERPL-SCNC: 139 MMOL/L (ref 135–145)
WBC # BLD AUTO: 6 THOU/CMM (ref 4–10)

## 2024-04-24 ENCOUNTER — TELEPHONE (OUTPATIENT)
Dept: HEMATOLOGY ONCOLOGY | Facility: CLINIC | Age: 44
End: 2024-04-24

## 2024-04-24 NOTE — TELEPHONE ENCOUNTER
Appointment Change  Cancel, Reschedule, Change to Virtual      Who are you speaking with? Patient   If it is not the patient, is the caller listed on the communication consent form? N/A   Which provider is the appointment scheduled with? Dr. Rick   When was the original appointment scheduled?    Please list date and time 04/24/2024 @ 2:40PM    At which location is the appointment scheduled to take place? [facility]   Was the appointment rescheduled?     Was the appointment changed from an in person visit to a virtual visit?    If so, please list the details of the change. Yes, 05/06/2024 @ 3:20PM    What is the reason for the appointment change? Family emergency

## 2024-05-31 ENCOUNTER — OFFICE VISIT (OUTPATIENT)
Dept: RHEUMATOLOGY | Facility: CLINIC | Age: 44
End: 2024-05-31
Payer: COMMERCIAL

## 2024-05-31 VITALS
SYSTOLIC BLOOD PRESSURE: 118 MMHG | DIASTOLIC BLOOD PRESSURE: 62 MMHG | HEIGHT: 66 IN | WEIGHT: 171 LBS | BODY MASS INDEX: 27.48 KG/M2

## 2024-05-31 DIAGNOSIS — M65.4 RADIAL STYLOID TENOSYNOVITIS (DE QUERVAIN): ICD-10-CM

## 2024-05-31 DIAGNOSIS — M35.9 UNDIFFERENTIATED CONNECTIVE TISSUE DISEASE (HCC): Primary | ICD-10-CM

## 2024-05-31 DIAGNOSIS — Z79.60 LONG-TERM USE OF IMMUNOSUPPRESSANT MEDICATION: ICD-10-CM

## 2024-05-31 PROCEDURE — 99215 OFFICE O/P EST HI 40 MIN: CPT | Performed by: STUDENT IN AN ORGANIZED HEALTH CARE EDUCATION/TRAINING PROGRAM

## 2024-05-31 RX ORDER — METHOTREXATE 2.5 MG/1
TABLET ORAL
Qty: 64 TABLET | Refills: 3 | Status: SHIPPED | OUTPATIENT
Start: 2024-05-31

## 2024-05-31 NOTE — PATIENT INSTRUCTIONS
Get blood work (GFR, creatinine, calcium, LFTs, CBC) every 3 months while on methotrexate. You are next due for blood work around the beginning of July.

## 2024-05-31 NOTE — PROGRESS NOTES
"Ambulatory Visit  Name: Shira Monte      : 1980      MRN: [id number]  Encounter Provider: Amaury Quintanilla DO  Encounter Date: 2024   Encounter department: [facility] RHEUMATOLOGY [facility] History: First saw me 2024 for joint stiffness for many years and worsening, intermittent finger puffiness worse in the mornings, SCOTT 1:320. Given symptoms was concerning for possible early inflammatory arthritis and/or early SSc.     Other significant PMH melanoma    Assessment & Plan   1. Undifferentiated connective tissue disease (HCC)  Assessment & Plan:  Her inflammatory arthritis is markedly improved on low-dose methotrexate.  Amenable to increasing  Orders:  -     methotrexate 2.5 MG tablet; Take 8 tablets on the same day every week; 4 in the morning, 4 in the evening.  2. Long-term use of immunosuppressant medication  Assessment & Plan:  Monitoring labs from the beginning of April look good  Orders:  -     Glom Filt Rate, Estimated; Standing  -     Creatinine, serum; Standing  -     Calcium; Standing  -     Hepatic function panel; Standing  -     CBC and differential; Standing  3. Radial styloid tenosynovitis (de quervain)  Assessment & Plan:  Right hand    Advised conservative measures such as Voltaren gel, splinting    Patient's rheumatologic disease(s) threaten long-term function if not appropriately treated.    Patient's rheumatologic medication(s) require intensive monitoring for toxicity.     History of Present Illness       After a few weeks on methotrexate, ankle swelling completely resolved. AM stiffness markedly improved, only 5-10 minutes. Hand redness isn't happening as frequently    Only other change is that the thumb on her R hand has been stiff and sore. That's somewhat new. Worse when using her hand a lot.    Review of Systems    Objective     /62   Ht 5' 6\" (1.676 m)   Wt 77.6 kg (171 lb)   LMP 10/08/2023 Comment: aub ongoing since   BMI 27.60 kg/m² "      General: Well appearing, in no distress.   Eyes: Sclera non-icteric. EOMI  HENT: No oral ulcers. MMM.   Extremities: Warm, well perfused, no edema.   Neuro: Alert and oriented. No gross focal neurological deficits.   Skin: No rashes.  MSK exam: tenderness to palpation R dorsal thumb between CMC and MCP, + Finkelstein test on R. No other tenderness to palpation, no synovitis      Physical Exam  Administrative Statements

## 2024-07-30 ENCOUNTER — TELEPHONE (OUTPATIENT)
Dept: HEMATOLOGY ONCOLOGY | Facility: CLINIC | Age: 44
End: 2024-07-30

## 2024-07-30 DIAGNOSIS — Z85.820 ENCOUNTER FOR FOLLOW-UP SURVEILLANCE OF MELANOMA: ICD-10-CM

## 2024-07-30 DIAGNOSIS — Z08 ENCOUNTER FOR FOLLOW-UP SURVEILLANCE OF MELANOMA: ICD-10-CM

## 2024-07-30 DIAGNOSIS — C43.59 MELANOMA OF BACK (HCC): Primary | ICD-10-CM

## 2024-07-30 NOTE — TELEPHONE ENCOUNTER
Message left on voicemail as a reminder to complete labs ordered before upcoming appointment.  Orders placed.

## 2024-07-31 ENCOUNTER — OFFICE VISIT (OUTPATIENT)
Dept: HEMATOLOGY ONCOLOGY | Facility: CLINIC | Age: 44
End: 2024-07-31
Payer: COMMERCIAL

## 2024-07-31 VITALS
SYSTOLIC BLOOD PRESSURE: 122 MMHG | HEIGHT: 66 IN | HEART RATE: 100 BPM | BODY MASS INDEX: 26.68 KG/M2 | RESPIRATION RATE: 16 BRPM | OXYGEN SATURATION: 97 % | DIASTOLIC BLOOD PRESSURE: 68 MMHG | TEMPERATURE: 98.3 F | WEIGHT: 166 LBS

## 2024-07-31 DIAGNOSIS — D22.9 ATYPICAL MOLE: ICD-10-CM

## 2024-07-31 DIAGNOSIS — Z85.820 ENCOUNTER FOR FOLLOW-UP SURVEILLANCE OF MELANOMA: Primary | ICD-10-CM

## 2024-07-31 DIAGNOSIS — C43.59 MELANOMA OF BACK (HCC): ICD-10-CM

## 2024-07-31 DIAGNOSIS — Z08 ENCOUNTER FOR FOLLOW-UP SURVEILLANCE OF MELANOMA: Primary | ICD-10-CM

## 2024-07-31 PROCEDURE — 11104 PUNCH BX SKIN SINGLE LESION: CPT | Performed by: INTERNAL MEDICINE

## 2024-07-31 PROCEDURE — 99214 OFFICE O/P EST MOD 30 MIN: CPT | Performed by: INTERNAL MEDICINE

## 2024-07-31 PROCEDURE — 88305 TISSUE EXAM BY PATHOLOGIST: CPT | Performed by: PATHOLOGY

## 2024-07-31 NOTE — LETTER
July 31, 2024     Joy Gallagher MD  [address] [facility] [address]  [place] [address]    Patient: Shira Monte   YOB: 1980   Date of Visit: 7/31/2024       Dear Dr. Gallagher:    Thank you for referring Shira Monte to me for evaluation. Below are my notes for this consultation.    If you have questions, please do not hesitate to call me. I look forward to following your patient along with you.         Sincerely,        Cassie Rick MD        CC: ALEXANDRA Irby MD  7/31/2024  5:43 PM  Sign when Signing Visit  [facility] [place]  [address] [facility] [address]  [phone]  [phone]     Date of Visit: 7/31/2024  Name: Shira Monte   YOB: 1980        Subjective    VISIT DIAGNOSIS:  Diagnoses and all orders for this visit:    Encounter for follow-up surveillance of melanoma    Melanoma of back (HCC)  -     CBC and differential; Future  -     Comprehensive metabolic panel; Future  -     LD,Blood; Future  -     Tissue Exam; Future  -     Tissue Exam  -     Ambulatory Referral to Dermatology; Future  -     Biopsy    Atypical mole  -     Tissue Exam; Future  -     Tissue Exam  -     Ambulatory Referral to Dermatology; Future  -     Biopsy        Oncology History   Melanoma of back (HCC)   8/11/2022 -  Cancer Staged    Staging form: Melanoma of the Skin, AJCC 8th Edition  - Clinical stage from 8/11/2022: Stage IA (cT1a, cN0, cM0) - Signed by Cassie Rick MD on 9/12/2022 8/11/2022 Biopsy    Right upper back, shave biopsy  0.4mm  No ulceration   No mitoses      9/12/2022 Initial Diagnosis    Melanoma of back (HCC)        Cancer Staging   Melanoma of back (HCC)  Staging form: Melanoma of the Skin, AJCC 8th Edition  - Clinical stage from 8/11/2022: Stage IA (cT1a, cN0, cM0) - Signed by Cassie Rick MD on 9/12/2022          HISTORY OF PRESENT ILLNESS: Shira Monte is a 43 y.o. female  who has stage Ia melanoma  here for continued monitoring, follow-up and surveillance.    She is doing well.  Feels good.  Did undergo hysterectomy and has been doing well since then.  She does have 2 lesions that she is concerned about.  One lesion is on the right cheek of her face and states that it is not itchy, painful or bleeding.  The other lesion is on her left upper thigh and has become raised and itchy over the past few weeks.  Previously saw dermatology but has not been able to get into see them recently.    Denies any lymphadenopathy.      REVIEW OF SYSTEMS:  Review of Systems   Constitutional:  Negative for appetite change, fatigue, fever and unexpected weight change.   HENT:   Negative for lump/mass, trouble swallowing and voice change.    Eyes:  Negative for icterus.   Respiratory:  Negative for cough, shortness of breath and wheezing.    Cardiovascular:  Negative for leg swelling.   Gastrointestinal:  Negative for abdominal pain, constipation, diarrhea, nausea and vomiting.   Genitourinary:  Negative for difficulty urinating and hematuria.    Musculoskeletal:  Negative for arthralgias, gait problem and myalgias.   Skin:  Negative for itching and rash.        One lesion on her face - right cheek, and one on her left upper thigh.   Neurological:  Negative for extremity weakness, gait problem, headaches, light-headedness and numbness.   Hematological:  Negative for adenopathy.        MEDICATIONS:    Current Outpatient Medications:   •  albuterol (PROVENTIL HFA,VENTOLIN HFA) 90 mcg/act inhaler, Inhale 2 puffs every 6 (six) hours as needed for wheezing, Disp: , Rfl:   •  budesonide-formoterol (SYMBICORT) 80-4.5 MCG/ACT inhaler, Inhale 2 puffs 2 (two) times a day Rinse mouth after use., Disp: , Rfl:   •  cholecalciferol (VITAMIN D3) 1,000 units tablet, Take 1,000 Units by mouth daily, Disp: , Rfl:   •  cyclobenzaprine (FLEXERIL) 5 mg tablet, Take 5 mg by mouth 3 (three) times a day as needed for muscle spasms, Disp: , Rfl:   •   ferrous sulfate 325 (65 Fe) mg tablet, Take 325 mg by mouth daily with breakfast, Disp: , Rfl:   •  FLUoxetine (PROzac) 10 MG tablet, Take 10 mg by mouth daily, Disp: , Rfl:   •  folic acid (KP Folic Acid) 1 mg tablet, Take 1 tablet (1 mg total) by mouth daily, Disp: 90 tablet, Rfl: 2  •  gabapentin (NEURONTIN) 300 mg capsule, Take 300 mg by mouth daily at bedtime, Disp: , Rfl:   •  methotrexate 2.5 MG tablet, Take 8 tablets on the same day every week; 4 in the morning, 4 in the evening., Disp: 64 tablet, Rfl: 3  •  omeprazole (PriLOSEC) 20 mg delayed release capsule, Take 20 mg by mouth if needed, Disp: , Rfl:   •  ondansetron (ZOFRAN) 4 mg tablet, Take 4 mg by mouth every 8 (eight) hours as needed for nausea or vomiting, Disp: , Rfl:   •  magnesium gluconate (MAGONATE) 500 mg tablet, Take 500 mg by mouth 2 (two) times a day (Patient not taking: Reported on 7/31/2024), Disp: , Rfl:   •  meloxicam (Mobic) 15 mg tablet, Take 1 tablet (15 mg total) by mouth daily (Patient not taking: Reported on 7/31/2024), Disp: 30 tablet, Rfl: 2  •  ondansetron (ZOFRAN-ODT) 4 mg disintegrating tablet, , Disp: , Rfl:   •  pantoprazole (PROTONIX) 40 mg tablet, Take 40 mg by mouth daily (Patient not taking: Reported on 7/31/2024), Disp: , Rfl:      ALLERGIES:  Allergies   Allergen Reactions   • Azithromycin GI Intolerance and Nausea Only     nausea and severe side pain        ACTIVE PROBLEMS:  Patient Active Problem List   Diagnosis   • Melanoma of back (HCC)   • Undifferentiated connective tissue disease (HCC)   • Inflammatory arthritis   • Long-term use of immunosuppressant medication   • Radial styloid tenosynovitis (de quervain)          PAST MEDICAL HISTORY:   Past Medical History:   Diagnosis Date   • Abnormal Pap smear of cervix    • Abnormal uterine bleeding (AUB)    • Anemia    • Arthritis    • Asthma    • Cancer (HCC)     melanoma   • Chronic pain disorder     back   • COVID-19 08/2021    mild s/s-fully vaccinated   • GERD  (gastroesophageal reflux disease)    • History of transfusion    • Melanoma (HCC) 8/24/22   • Pneumonia     as a child   • Shortness of breath     allergies trigger/on exersion   • Varicella    • Wears contact lenses    • Wears glasses         PAST SURGICAL HISTORY:  Past Surgical History:   Procedure Laterality Date   • APPENDECTOMY  2011   • BREAST BIOPSY     • CHOLECYSTECTOMY  2011   • COLONOSCOPY     • EGD     • ENDOMETRIAL ABLATION N/A 2021   • HERNIA REPAIR  2011    umbilical hernia   • INTESTINAL MALROTATION REPAIR N/A 11/2015   • MYOMECTOMY     • DE CYSTOURETHROSCOPY N/A 10/19/2023    Procedure: CYSTOSCOPY;  Surgeon: Montse Smart MD;  Location: [facility];  Service: Gynecology   • DE LAPS TOTAL HYSTERECT 250 GM/< W/RMVL TUBE/OVARY N/A 10/19/2023    Procedure: HYSTERECTOMY LAPAROSCOPIC TOTAL (LTH) WITH BILATERAL SALPINGECTOMY ;  Surgeon: Montse Smart MD;  Location: [facility];  Service: Gynecology   • SKIN BIOPSY  8/11/22   • SKIN LESION EXCISION N/A 10/07/2022    Procedure: WIDE EXCISION OF UPPER BACK MELANOMA;  Surgeon: Joy Gallagher MD;  Location:  MAIN OR;  Service: Surgical Oncology   • SPINAL FUSION N/A 11/2005    with [place] shira-        SOCIAL HISTORY:  Social History     Socioeconomic History   • Marital status: /Civil Union     Spouse name: Not on file   • Number of children: Not on file   • Years of education: Not on file   • Highest education level: Not on file   Occupational History   • Not on file   Tobacco Use   • Smoking status: Never   • Smokeless tobacco: Never   Vaping Use   • Vaping status: Never Used   Substance and Sexual Activity   • Alcohol use: Not Currently     Alcohol/week: 2.0 standard drinks of alcohol     Types: 2 Glasses of wine per week     Comment: Not regularly   • Drug use: Not Currently     Frequency: 1.0 times per week     Types: Marijuana     Comment: Medical Marijuana for pain   • Sexual activity: Yes     Partners: Male     Birth  "control/protection: None   Other Topics Concern   • Not on file   Social History Narrative   • Not on file     Social Determinants of Health     Financial Resource Strain: Not on file   Food Insecurity: Not on file   Transportation Needs: Not on file   Physical Activity: Not on file   Stress: Not on file   Social Connections: Unknown (6/18/2024)    Received from [employer]    Social Connections    • How often do you feel lonely or isolated from those around you? (Adult - for ages 18 years and over): Not on file   Intimate Partner Violence: Not on file   Housing Stability: Not on file        FAMILY HISTORY:  Family History   Problem Relation Age of Onset   • Hypertension Mother    • Diverticulosis Mother    • Irritable bowel syndrome Mother    • Heart attack Mother    • Osteoarthritis Mother    • Osteoporosis Mother    • Diabetes Father    • Hypertension Father    • Asthma Father    • Heart attack Father    • Heart disease Father    • Colon cancer Maternal Grandmother    • Cancer Maternal Grandmother    • Cancer Maternal Grandfather         Lung cancer   • Diabetes Paternal Grandfather    • Cancer Maternal Aunt         Skin - melanoma   • Cancer Maternal Uncle         Skin - melanoma   • Cancer Maternal Uncle         Skin - melanoma   • Colon cancer Other    • Breast cancer Paternal Aunt 40 - 49           Objective    PHYSICAL EXAMINATION:   Blood pressure 122/68, pulse 100, temperature 98.3 °F (36.8 °C), temperature source Temporal, resp. rate 16, height 5' 6\" (1.676 m), weight 75.3 kg (166 lb), last menstrual period 10/08/2023, SpO2 97%.     Pain Score: 0-No pain     ECOG Performance Status      Flowsheet Row Most Recent Value   ECOG Performance Status 0 - Fully active, able to carry on all pre-disease performance without restriction               Physical Exam  Constitutional:       General: She is not in acute distress.     Appearance: Normal appearance. She is not ill-appearing or toxic-appearing.   HENT:      " Head: Normocephalic and atraumatic.      Right Ear: External ear normal.      Left Ear: External ear normal.      Nose: Nose normal.      Mouth/Throat:      Mouth: Mucous membranes are moist.      Pharynx: Oropharynx is clear.   Eyes:      General: No scleral icterus.        Right eye: No discharge.         Left eye: No discharge.      Conjunctiva/sclera: Conjunctivae normal.   Cardiovascular:      Rate and Rhythm: Normal rate and regular rhythm.      Pulses: Normal pulses.      Heart sounds: No murmur heard.     No friction rub. No gallop.   Pulmonary:      Effort: Pulmonary effort is normal. No respiratory distress.      Breath sounds: Normal breath sounds. No wheezing or rales.   Abdominal:      General: Bowel sounds are normal. There is no distension.      Palpations: There is no mass.      Tenderness: There is no abdominal tenderness. There is no rebound.   Musculoskeletal:         General: No swelling or tenderness.      Cervical back: Normal range of motion. No rigidity.      Right lower leg: No edema.      Left lower leg: No edema.   Lymphadenopathy:      Head:      Right side of head: No submandibular, preauricular or posterior auricular adenopathy.      Left side of head: No submandibular, preauricular or posterior auricular adenopathy.      Cervical: No cervical adenopathy.      Right cervical: No superficial or posterior cervical adenopathy.     Left cervical: No superficial or posterior cervical adenopathy.      Upper Body:      Right upper body: No supraclavicular or axillary adenopathy.      Left upper body: No supraclavicular or axillary adenopathy.      Lower Body: No right inguinal adenopathy. No left inguinal adenopathy.   Skin:     General: Skin is warm.      Coloration: Skin is not jaundiced.      Findings: Lesion present. No rash.      Comments: Well healed surgical scar.  No evidence of recurrence at primary site.  Small 3 mm raised area of skin, possible light pigmentation on right cheek,  medially without concerning features.  5 mm lesion on left mid upper thigh, no pigment but with erythema and slightly raised.   Neurological:      General: No focal deficit present.      Mental Status: She is alert and oriented to person, place, and time.      Cranial Nerves: No cranial nerve deficit.      Motor: No weakness.      Gait: Gait normal.   Psychiatric:         Mood and Affect: Mood normal.         Behavior: Behavior normal.         Thought Content: Thought content normal.         Judgment: Judgment normal.         I reviewed lab data in the chart.    WBC   Date Value Ref Range Status   10/19/2023 4.42 4.31 - 10.16 Thousand/uL Final     White Blood Cell Count   Date Value Ref Range Status   04/05/2024 6.0 4.0 - 10.0 thou/cmm Final     Hemoglobin   Date Value Ref Range Status   04/05/2024 13.9 11.5 - 14.5 g/dL Final   10/19/2023 12.6 11.5 - 15.4 g/dL Final     Platelet Count   Date Value Ref Range Status   04/05/2024 233 140 - 350 thou/cmm Final     Platelets   Date Value Ref Range Status   10/19/2023 211 149 - 390 Thousands/uL Final     MCV   Date Value Ref Range Status   04/05/2024 97 80 - 100 fL Final   10/19/2023 99 (H) 82 - 98 fL Final      Potassium   Date Value Ref Range Status   04/05/2024 4.2 3.5 - 5.2 mmol/L Final   04/05/2024 4.2 3.5 - 5.2 mmol/L Final   03/15/2024 4.2 3.5 - 5.2 mmol/L Final   10/12/2023 3.7 3.5 - 5.2 mmol/L Final     Chloride   Date Value Ref Range Status   04/05/2024 105 100 - 109 mmol/L Final   04/05/2024 105 100 - 109 mmol/L Final   03/15/2024 106 100 - 109 mmol/L Final   10/12/2023 105 100 - 109 mmol/L Final     Carbon Dioxide   Date Value Ref Range Status   04/05/2024 25 21 - 31 mmol/L Final   03/15/2024 27 21 - 31 mmol/L Final   10/12/2023 26 21 - 31 mmol/L Final     CO2   Date Value Ref Range Status   04/05/2024 25 21 - 31 mmol/L Final     BUN   Date Value Ref Range Status   04/05/2024 12 7 - 25 mg/dL Final   04/05/2024 12 7 - 25 mg/dL Final   03/15/2024 12 7 - 25 mg/dL  Final   10/12/2023 13 7 - 25 mg/dL Final     Creatinine   Date Value Ref Range Status   04/05/2024 0.69 0.40 - 1.10 mg/dL Final   04/05/2024 0.69 0.40 - 1.10 mg/dL Final   03/15/2024 0.74 0.40 - 1.10 mg/dL Final   10/12/2023 0.76 0.40 - 1.10 mg/dL Final     Glucose   Date Value Ref Range Status   04/05/2024 84 65 - 99 mg/dL Final   03/15/2024 97 65 - 99 mg/dL Final   10/12/2023 99 65 - 99 mg/dL Final     Glucose, Random   Date Value Ref Range Status   04/05/2024 84 65 - 99 mg/dL Final     Calcium   Date Value Ref Range Status   04/05/2024 9.6 8.5 - 10.1 mg/dL Final   04/05/2024 9.6 8.5 - 10.1 mg/dL Final   03/15/2024 9.7 8.5 - 10.1 mg/dL Final   10/12/2023 9.1 8.5 - 10.1 mg/dL Final     Albumin   Date Value Ref Range Status   04/05/2024 4.6 3.5 - 5.7 g/dL Final   03/11/2023 4.1 3.8 - 5.0 g/dL Final     ALBUMIN   Date Value Ref Range Status   04/05/2024 4.6 3.5 - 5.7 g/dL Final   03/15/2024 4.4 3.5 - 5.7 g/dL Final   07/18/2023 4.3 3.5 - 5.7 g/dL Final     Total Bilirubin   Date Value Ref Range Status   04/05/2024 0.5 0.2 - 1.0 mg/dL Final     Comment:     Eltrombopag and its metabolites may interfere with this assay causing erroneously high patient results.   03/15/2024 0.5 0.2 - 1.0 mg/dL Final     Comment:     Eltrombopag and its metabolites may interfere with this assay causing erroneously high patient results.   07/18/2023 0.4 0.2 - 1.0 mg/dL Final     Comment:     Eltrombopag and its metabolites may interfere with this assay causing erroneously high patient results.     TOTAL BILIRUBIN   Date Value Ref Range Status   04/05/2024 0.5 0.2 - 1.0 mg/dL Final     Comment:     Eltrombopag and its metabolites may interfere with this assay causing   erroneously high patient results.       Alkaline Phosphatase   Date Value Ref Range Status   04/05/2024 48 35 - 120 U/L Final   04/05/2024 48 35 - 120 U/L Final   03/15/2024 56 35 - 120 U/L Final   07/18/2023 55 35 - 120 U/L Final     AST   Date Value Ref Range Status  "  04/05/2024 14 <41 U/L Final   04/05/2024 14 <41 U/L Final   03/15/2024 15 <41 U/L Final   07/18/2023 14 <41 U/L Final     ALT   Date Value Ref Range Status   04/05/2024 13 <56 U/L Final   04/05/2024 13 <56 U/L Final   03/15/2024 17 <56 U/L Final   07/18/2023 9 <56 U/L Final      No results found for: \"LDH\"  No results found for: \"TSH\"  No results found for: \"S7GSFSY\"   No results found for: \"FREET4\"      RECENT IMAGING:  No results found.          Assessment/Plan  Ms. Monte is a 43-year-old female with stage Ia melanoma and 2 concerning skin lesions here for continued monitoring, follow-up and surveillance.    1. Encounter for follow-up surveillance of melanoma  2. Melanoma of back (HCC)  She is doing well with no clinical evidence of melanoma recurrence.  Labs reviewed from April and okay.  Originally she was was to see us earlier but appointment needed to be rescheduled.  She needs to go follow-up with dermatology and we will see if we can help get her an appointment.  In the meantime she has 2 new spots that are concerning, with the left upper thigh being more concerning given that it is itching.  We discussed that we will biopsy this lesion.  I will defer the lesion on her cheek to dermatology, as there is no definitive concerning features at this time and can be assessed when she is able to see them.      - CBC and differential; Future  - Comprehensive metabolic panel; Future  - LD,Blood; Future  - Tissue Exam; Future  - Tissue Exam  - Ambulatory Referral to Dermatology; Future    3. Atypical mole  Given history of melanoma and the lesion  on her left upper thigh that is rounded and itching I do recommend biopsy to evaluate.      - Tissue Exam; Future  - Tissue Exam  - Ambulatory Referral to Dermatology; Future        Biopsy    Date/Time: 7/31/2024 2:20 PM    Performed by: Cassie Rick MD  Authorized by: Cassie Rick MD  Universal Protocol:  Consent: Verbal consent obtained. Written consent not " "obtained.  Risks and benefits: risks, benefits and alternatives were discussed  Consent given by: patient  Time out: Immediately prior to procedure a \"time out\" was called to verify the correct patient, procedure, equipment, support staff and site/side marked as required.  Patient understanding: patient states understanding of the procedure being performed  Patient consent: the patient's understanding of the procedure matches consent given  Procedure consent: procedure consent matches procedure scheduled  Patient identity confirmed: verbally with patient    Procedure Details - Lesion Biopsy:     Body area:  Lower extremity    Lower extremity location:  L upper leg    Biopsy method: punch biopsy      Biopsy tissue type: skin    Malignancy: malignancy unknown       After obtaining informed consent, the patient was draped and prepped in the [facility].      Betadine was used for cleansing.  The area was anesthetized with 0.25% bupivocaine w/out epinephrine. A 4 mm punch biopsy was performed without difficulty. The biopsy site bleeding was stopped with pressure.    Bacitracin was applied to the biopsy site and a dry sterile dressing was placed.  Wound care instructions were provided.  The patient tolerated the procedure well with no complications. Patient was lightheaded following biopsy, but improved and felt better to go home.              Return in about 6 months (around 1/31/2025) for Office Visit, labs.     Cassie Rick MD, PhD  "

## 2024-07-31 NOTE — PROGRESS NOTES
[facility] [place]  [address]  [place] [address]  [phone]     Date of Visit: 7/31/2024  Name: Shira Monte   YOB: 1980        Subjective    VISIT DIAGNOSIS:  Diagnoses and all orders for this visit:    Encounter for follow-up surveillance of melanoma    Melanoma of back (HCC)  -     CBC and differential; Future  -     Comprehensive metabolic panel; Future  -     LD,Blood; Future  -     Tissue Exam; Future  -     Tissue Exam  -     Ambulatory Referral to Dermatology; Future  -     Biopsy    Atypical mole  -     Tissue Exam; Future  -     Tissue Exam  -     Ambulatory Referral to Dermatology; Future  -     Biopsy        Oncology History   Melanoma of back (HCC)   8/11/2022 -  Cancer Staged    Staging form: Melanoma of the Skin, AJCC 8th Edition  - Clinical stage from 8/11/2022: Stage IA (cT1a, cN0, cM0) - Signed by Cassie Rick MD on 9/12/2022 8/11/2022 Biopsy    Right upper back, shave biopsy  0.4mm  No ulceration   No mitoses      9/12/2022 Initial Diagnosis    Melanoma of back (HCC)        Cancer Staging   Melanoma of back (HCC)  Staging form: Melanoma of the Skin, AJCC 8th Edition  - Clinical stage from 8/11/2022: Stage IA (cT1a, cN0, cM0) - Signed by Cassie Rick MD on 9/12/2022          HISTORY OF PRESENT ILLNESS: Shira Monte is a 43 y.o. female  who has stage Ia melanoma here for continued monitoring, follow-up and surveillance.    She is doing well.  Feels good.  Did undergo hysterectomy and has been doing well since then.  She does have 2 lesions that she is concerned about.  One lesion is on the right cheek of her face and states that it is not itchy, painful or bleeding.  The other lesion is on her left upper thigh and has become raised and itchy over the past few weeks.  Previously saw dermatology but has not been able to get into see them recently.    Denies any lymphadenopathy.      REVIEW OF SYSTEMS:  Review of  Systems   Constitutional:  Negative for appetite change, fatigue, fever and unexpected weight change.   HENT:   Negative for lump/mass, trouble swallowing and voice change.    Eyes:  Negative for icterus.   Respiratory:  Negative for cough, shortness of breath and wheezing.    Cardiovascular:  Negative for leg swelling.   Gastrointestinal:  Negative for abdominal pain, constipation, diarrhea, nausea and vomiting.   Genitourinary:  Negative for difficulty urinating and hematuria.    Musculoskeletal:  Negative for arthralgias, gait problem and myalgias.   Skin:  Negative for itching and rash.        One lesion on her face - right cheek, and one on her left upper thigh.   Neurological:  Negative for extremity weakness, gait problem, headaches, light-headedness and numbness.   Hematological:  Negative for adenopathy.        MEDICATIONS:    Current Outpatient Medications:     albuterol (PROVENTIL HFA,VENTOLIN HFA) 90 mcg/act inhaler, Inhale 2 puffs every 6 (six) hours as needed for wheezing, Disp: , Rfl:     budesonide-formoterol (SYMBICORT) 80-4.5 MCG/ACT inhaler, Inhale 2 puffs 2 (two) times a day Rinse mouth after use., Disp: , Rfl:     cholecalciferol (VITAMIN D3) 1,000 units tablet, Take 1,000 Units by mouth daily, Disp: , Rfl:     cyclobenzaprine (FLEXERIL) 5 mg tablet, Take 5 mg by mouth 3 (three) times a day as needed for muscle spasms, Disp: , Rfl:     ferrous sulfate 325 (65 Fe) mg tablet, Take 325 mg by mouth daily with breakfast, Disp: , Rfl:     FLUoxetine (PROzac) 10 MG tablet, Take 10 mg by mouth daily, Disp: , Rfl:     folic acid (KP Folic Acid) 1 mg tablet, Take 1 tablet (1 mg total) by mouth daily, Disp: 90 tablet, Rfl: 2    gabapentin (NEURONTIN) 300 mg capsule, Take 300 mg by mouth daily at bedtime, Disp: , Rfl:     methotrexate 2.5 MG tablet, Take 8 tablets on the same day every week; 4 in the morning, 4 in the evening., Disp: 64 tablet, Rfl: 3    omeprazole (PriLOSEC) 20 mg delayed release capsule,  Take 20 mg by mouth if needed, Disp: , Rfl:     ondansetron (ZOFRAN) 4 mg tablet, Take 4 mg by mouth every 8 (eight) hours as needed for nausea or vomiting, Disp: , Rfl:     magnesium gluconate (MAGONATE) 500 mg tablet, Take 500 mg by mouth 2 (two) times a day (Patient not taking: Reported on 7/31/2024), Disp: , Rfl:     meloxicam (Mobic) 15 mg tablet, Take 1 tablet (15 mg total) by mouth daily (Patient not taking: Reported on 7/31/2024), Disp: 30 tablet, Rfl: 2    ondansetron (ZOFRAN-ODT) 4 mg disintegrating tablet, , Disp: , Rfl:     pantoprazole (PROTONIX) 40 mg tablet, Take 40 mg by mouth daily (Patient not taking: Reported on 7/31/2024), Disp: , Rfl:      ALLERGIES:  Allergies   Allergen Reactions    Azithromycin GI Intolerance and Nausea Only     nausea and severe side pain        ACTIVE PROBLEMS:  Patient Active Problem List   Diagnosis    Melanoma of back (HCC)    Undifferentiated connective tissue disease (HCC)    Inflammatory arthritis    Long-term use of immunosuppressant medication    Radial styloid tenosynovitis (de quervain)          PAST MEDICAL HISTORY:   Past Medical History:   Diagnosis Date    Abnormal Pap smear of cervix     Abnormal uterine bleeding (AUB)     Anemia     Arthritis     Asthma     Cancer (HCC)     melanoma    Chronic pain disorder     back    COVID-19 08/2021    mild s/s-fully vaccinated    GERD (gastroesophageal reflux disease)     History of transfusion     Melanoma (HCC) 8/24/22    Pneumonia     as a child    Shortness of breath     allergies trigger/on exersion    Varicella     Wears contact lenses     Wears glasses         PAST SURGICAL HISTORY:  Past Surgical History:   Procedure Laterality Date    APPENDECTOMY  2011    BREAST BIOPSY      CHOLECYSTECTOMY  2011    COLONOSCOPY      EGD      ENDOMETRIAL ABLATION N/A 2021    HERNIA REPAIR  2011    umbilical hernia    INTESTINAL MALROTATION REPAIR N/A 11/2015    MYOMECTOMY      IN CYSTOURETHROSCOPY N/A 10/19/2023    Procedure:  CYSTOSCOPY;  Surgeon: Montse Smart MD;  Location: [facility];  Service: Gynecology    NC LAPS TOTAL HYSTERECT 250 GM/< W/RMVL TUBE/OVARY N/A 10/19/2023    Procedure: HYSTERECTOMY LAPAROSCOPIC TOTAL (LTH) WITH BILATERAL SALPINGECTOMY ;  Surgeon: Montse Smart MD;  Location: [facility];  Service: Gynecology    SKIN BIOPSY  8/11/22    SKIN LESION EXCISION N/A 10/07/2022    Procedure: WIDE EXCISION OF UPPER BACK MELANOMA;  Surgeon: Joy Gallagher MD;  Location:  MAIN OR;  Service: Surgical Oncology    SPINAL FUSION N/A 11/2005    with [place] shira-        SOCIAL HISTORY:  Social History     Socioeconomic History    Marital status: /Civil Union     Spouse name: Not on file    Number of children: Not on file    Years of education: Not on file    Highest education level: Not on file   Occupational History    Not on file   Tobacco Use    Smoking status: Never    Smokeless tobacco: Never   Vaping Use    Vaping status: Never Used   Substance and Sexual Activity    Alcohol use: Not Currently     Alcohol/week: 2.0 standard drinks of alcohol     Types: 2 Glasses of wine per week     Comment: Not regularly    Drug use: Not Currently     Frequency: 1.0 times per week     Types: Marijuana     Comment: Medical Marijuana for pain    Sexual activity: Yes     Partners: Male     Birth control/protection: None   Other Topics Concern    Not on file   Social History Narrative    Not on file     Social Determinants of Health     Financial Resource Strain: Not on file   Food Insecurity: Not on file   Transportation Needs: Not on file   Physical Activity: Not on file   Stress: Not on file   Social Connections: Unknown (6/18/2024)    Received from [employer]    Social [employer]     How often do you feel lonely or isolated from those around you? (Adult - for ages 18 years and over): Not on file   Intimate Partner Violence: Not on file   Housing Stability: Not on file        FAMILY HISTORY:  Family History   Problem  "Relation Age of Onset    Hypertension Mother     Diverticulosis Mother     Irritable bowel syndrome Mother     Heart attack Mother     Osteoarthritis Mother     Osteoporosis Mother     Diabetes Father     Hypertension Father     Asthma Father     Heart attack Father     Heart disease Father     Colon cancer Maternal Grandmother     Cancer Maternal Grandmother     Cancer Maternal Grandfather         Lung cancer    Diabetes Paternal Grandfather     Cancer Maternal Aunt         Skin - melanoma    Cancer Maternal Uncle         Skin - melanoma    Cancer Maternal Uncle         Skin - melanoma    Colon cancer Other     Breast cancer Paternal Aunt 40 - 49           Objective    PHYSICAL EXAMINATION:   Blood pressure 122/68, pulse 100, temperature 98.3 °F (36.8 °C), temperature source Temporal, resp. rate 16, height 5' 6\" (1.676 m), weight 75.3 kg (166 lb), last menstrual period 10/08/2023, SpO2 97%.     Pain Score: 0-No pain     ECOG Performance Status      Flowsheet Row Most Recent Value   ECOG Performance Status 0 - Fully active, able to carry on all pre-disease performance without restriction               Physical Exam  Constitutional:       General: She is not in acute distress.     Appearance: Normal appearance. She is not ill-appearing or toxic-appearing.   HENT:      Head: Normocephalic and atraumatic.      Right Ear: External ear normal.      Left Ear: External ear normal.      Nose: Nose normal.      Mouth/Throat:      Mouth: Mucous membranes are moist.      Pharynx: Oropharynx is clear.   Eyes:      General: No scleral icterus.        Right eye: No discharge.         Left eye: No discharge.      Conjunctiva/sclera: Conjunctivae normal.   Cardiovascular:      Rate and Rhythm: Normal rate and regular rhythm.      Pulses: Normal pulses.      Heart sounds: No murmur heard.     No friction rub. No gallop.   Pulmonary:      Effort: Pulmonary effort is normal. No respiratory distress.      Breath sounds: Normal breath " sounds. No wheezing or rales.   Abdominal:      General: Bowel sounds are normal. There is no distension.      Palpations: There is no mass.      Tenderness: There is no abdominal tenderness. There is no rebound.   Musculoskeletal:         General: No swelling or tenderness.      Cervical back: Normal range of motion. No rigidity.      Right lower leg: No edema.      Left lower leg: No edema.   Lymphadenopathy:      Head:      Right side of head: No submandibular, preauricular or posterior auricular adenopathy.      Left side of head: No submandibular, preauricular or posterior auricular adenopathy.      Cervical: No cervical adenopathy.      Right cervical: No superficial or posterior cervical adenopathy.     Left cervical: No superficial or posterior cervical adenopathy.      Upper Body:      Right upper body: No supraclavicular or axillary adenopathy.      Left upper body: No supraclavicular or axillary adenopathy.      Lower Body: No right inguinal adenopathy. No left inguinal adenopathy.   Skin:     General: Skin is warm.      Coloration: Skin is not jaundiced.      Findings: Lesion present. No rash.      Comments: Well healed surgical scar.  No evidence of recurrence at primary site.  Small 3 mm raised area of skin, possible light pigmentation on right cheek, medially without concerning features.  5 mm lesion on left mid upper thigh, no pigment but with erythema and slightly raised.   Neurological:      General: No focal deficit present.      Mental Status: She is alert and oriented to person, place, and time.      Cranial Nerves: No cranial nerve deficit.      Motor: No weakness.      Gait: Gait normal.   Psychiatric:         Mood and Affect: Mood normal.         Behavior: Behavior normal.         Thought Content: Thought content normal.         Judgment: Judgment normal.         I reviewed lab data in the chart.    WBC   Date Value Ref Range Status   10/19/2023 4.42 4.31 - 10.16 Thousand/uL Final     White  Blood Cell Count   Date Value Ref Range Status   04/05/2024 6.0 4.0 - 10.0 thou/cmm Final     Hemoglobin   Date Value Ref Range Status   04/05/2024 13.9 11.5 - 14.5 g/dL Final   10/19/2023 12.6 11.5 - 15.4 g/dL Final     Platelet Count   Date Value Ref Range Status   04/05/2024 233 140 - 350 thou/cmm Final     Platelets   Date Value Ref Range Status   10/19/2023 211 149 - 390 Thousands/uL Final     MCV   Date Value Ref Range Status   04/05/2024 97 80 - 100 fL Final   10/19/2023 99 (H) 82 - 98 fL Final      Potassium   Date Value Ref Range Status   04/05/2024 4.2 3.5 - 5.2 mmol/L Final   04/05/2024 4.2 3.5 - 5.2 mmol/L Final   03/15/2024 4.2 3.5 - 5.2 mmol/L Final   10/12/2023 3.7 3.5 - 5.2 mmol/L Final     Chloride   Date Value Ref Range Status   04/05/2024 105 100 - 109 mmol/L Final   04/05/2024 105 100 - 109 mmol/L Final   03/15/2024 106 100 - 109 mmol/L Final   10/12/2023 105 100 - 109 mmol/L Final     Carbon Dioxide   Date Value Ref Range Status   04/05/2024 25 21 - 31 mmol/L Final   03/15/2024 27 21 - 31 mmol/L Final   10/12/2023 26 21 - 31 mmol/L Final     CO2   Date Value Ref Range Status   04/05/2024 25 21 - 31 mmol/L Final     BUN   Date Value Ref Range Status   04/05/2024 12 7 - 25 mg/dL Final   04/05/2024 12 7 - 25 mg/dL Final   03/15/2024 12 7 - 25 mg/dL Final   10/12/2023 13 7 - 25 mg/dL Final     Creatinine   Date Value Ref Range Status   04/05/2024 0.69 0.40 - 1.10 mg/dL Final   04/05/2024 0.69 0.40 - 1.10 mg/dL Final   03/15/2024 0.74 0.40 - 1.10 mg/dL Final   10/12/2023 0.76 0.40 - 1.10 mg/dL Final     Glucose   Date Value Ref Range Status   04/05/2024 84 65 - 99 mg/dL Final   03/15/2024 97 65 - 99 mg/dL Final   10/12/2023 99 65 - 99 mg/dL Final     Glucose, Random   Date Value Ref Range Status   04/05/2024 84 65 - 99 mg/dL Final     Calcium   Date Value Ref Range Status   04/05/2024 9.6 8.5 - 10.1 mg/dL Final   04/05/2024 9.6 8.5 - 10.1 mg/dL Final   03/15/2024 9.7 8.5 - 10.1 mg/dL Final  "  10/12/2023 9.1 8.5 - 10.1 mg/dL Final     Albumin   Date Value Ref Range Status   04/05/2024 4.6 3.5 - 5.7 g/dL Final   03/11/2023 4.1 3.8 - 5.0 g/dL Final     ALBUMIN   Date Value Ref Range Status   04/05/2024 4.6 3.5 - 5.7 g/dL Final   03/15/2024 4.4 3.5 - 5.7 g/dL Final   07/18/2023 4.3 3.5 - 5.7 g/dL Final     Total Bilirubin   Date Value Ref Range Status   04/05/2024 0.5 0.2 - 1.0 mg/dL Final     Comment:     Eltrombopag and its metabolites may interfere with this assay causing erroneously high patient results.   03/15/2024 0.5 0.2 - 1.0 mg/dL Final     Comment:     Eltrombopag and its metabolites may interfere with this assay causing erroneously high patient results.   07/18/2023 0.4 0.2 - 1.0 mg/dL Final     Comment:     Eltrombopag and its metabolites may interfere with this assay causing erroneously high patient results.     TOTAL BILIRUBIN   Date Value Ref Range Status   04/05/2024 0.5 0.2 - 1.0 mg/dL Final     Comment:     Eltrombopag and its metabolites may interfere with this assay causing   erroneously high patient results.       Alkaline Phosphatase   Date Value Ref Range Status   04/05/2024 48 35 - 120 U/L Final   04/05/2024 48 35 - 120 U/L Final   03/15/2024 56 35 - 120 U/L Final   07/18/2023 55 35 - 120 U/L Final     AST   Date Value Ref Range Status   04/05/2024 14 <41 U/L Final   04/05/2024 14 <41 U/L Final   03/15/2024 15 <41 U/L Final   07/18/2023 14 <41 U/L Final     ALT   Date Value Ref Range Status   04/05/2024 13 <56 U/L Final   04/05/2024 13 <56 U/L Final   03/15/2024 17 <56 U/L Final   07/18/2023 9 <56 U/L Final      No results found for: \"LDH\"  No results found for: \"TSH\"  No results found for: \"J3EGHIC\"   No results found for: \"FREET4\"      RECENT IMAGING:  No results found.          Assessment/Plan  Ms. Monte is a 43-year-old female with stage Ia melanoma and 2 concerning skin lesions here for continued monitoring, follow-up and surveillance.    1. Encounter for follow-up " "surveillance of melanoma  2. Melanoma of back (HCC)  She is doing well with no clinical evidence of melanoma recurrence.  Labs reviewed from April and okay.  Originally she was was to see us earlier but appointment needed to be rescheduled.  She needs to go follow-up with dermatology and we will see if we can help get her an appointment.  In the meantime she has 2 new spots that are concerning, with the left upper thigh being more concerning given that it is itching.  We discussed that we will biopsy this lesion.  I will defer the lesion on her cheek to dermatology, as there is no definitive concerning features at this time and can be assessed when she is able to see them.      - CBC and differential; Future  - Comprehensive metabolic panel; Future  - LD,Blood; Future  - Tissue Exam; Future  - Tissue Exam  - Ambulatory Referral to Dermatology; Future    3. Atypical mole  Given history of melanoma and the lesion  on her left upper thigh that is rounded and itching I do recommend biopsy to evaluate.      - Tissue Exam; Future  - Tissue Exam  - Ambulatory Referral to Dermatology; Future        Biopsy    Date/Time: 7/31/2024 2:20 PM    Performed by: Cassie Rick MD  Authorized by: Cassie Rick MD  Universal Protocol:  Consent: Verbal consent obtained. Written consent not obtained.  Risks and benefits: risks, benefits and alternatives were discussed  Consent given by: patient  Time out: Immediately prior to procedure a \"time out\" was called to verify the correct patient, procedure, equipment, support staff and site/side marked as required.  Patient understanding: patient states understanding of the procedure being performed  Patient consent: the patient's understanding of the procedure matches consent given  Procedure consent: procedure consent matches procedure scheduled  Patient identity confirmed: verbally with patient    Procedure Details - Lesion Biopsy:     Body area:  Lower extremity    Lower extremity " location:  L upper leg    Biopsy method: punch biopsy      Biopsy tissue type: skin    Malignancy: malignancy unknown       After obtaining informed consent, the patient was draped and prepped in the [facility].      Betadine was used for cleansing.  The area was anesthetized with 0.25% bupivocaine w/out epinephrine. A 4 mm punch biopsy was performed without difficulty. The biopsy site bleeding was stopped with pressure.    Bacitracin was applied to the biopsy site and a dry sterile dressing was placed.  Wound care instructions were provided.  The patient tolerated the procedure well with no complications. Patient was lightheaded following biopsy, but improved and felt better to go home.              Return in about 6 months (around 1/31/2025) for Office Visit, labs.     Cassie Rick MD, PhD

## 2024-08-01 ENCOUNTER — TELEPHONE (OUTPATIENT)
Dept: DERMATOLOGY | Facility: CLINIC | Age: 44
End: 2024-08-01

## 2024-08-01 NOTE — TELEPHONE ENCOUNTER
Called and left voicemail advising patient we have added her for an appt on 08/29 @ 10am at [facility]. Provided callback info if appt date/time does not work.

## 2024-08-02 DIAGNOSIS — R92.8 ABNORMAL ULTRASOUND OF BREAST: ICD-10-CM

## 2024-08-02 DIAGNOSIS — N60.01 BREAST CYST, RIGHT: Primary | ICD-10-CM

## 2024-08-05 ENCOUNTER — TELEPHONE (OUTPATIENT)
Dept: HEMATOLOGY ONCOLOGY | Facility: CLINIC | Age: 44
End: 2024-08-05

## 2024-08-05 PROCEDURE — 88305 TISSUE EXAM BY PATHOLOGIST: CPT | Performed by: PATHOLOGY

## 2024-08-05 NOTE — TELEPHONE ENCOUNTER
----- Message from Cassie Rick MD sent at 8/5/2024  3:10 PM EDT -----  Can you let her know that this is benign - a dermatofibroma  Thanks  m  ----- Message -----  From: Lab, Background User  Sent: 8/5/2024  12:30 PM EDT  To: Cassie Rick MD    Attempted to call patient to discuss results, no answer. Left detailed message with benign results. Left call back number for any questions.

## 2024-09-27 DIAGNOSIS — Z00.6 ENCOUNTER FOR EXAMINATION FOR NORMAL COMPARISON OR CONTROL IN CLINICAL RESEARCH PROGRAM: ICD-10-CM

## 2024-10-05 ENCOUNTER — OFFICE VISIT (OUTPATIENT)
Dept: URGENT CARE | Facility: CLINIC | Age: 44
End: 2024-10-05
Payer: COMMERCIAL

## 2024-10-05 VITALS
DIASTOLIC BLOOD PRESSURE: 78 MMHG | RESPIRATION RATE: 20 BRPM | OXYGEN SATURATION: 99 % | TEMPERATURE: 97.8 F | HEART RATE: 89 BPM | SYSTOLIC BLOOD PRESSURE: 130 MMHG

## 2024-10-05 DIAGNOSIS — R50.9 FEVER, UNSPECIFIED FEVER CAUSE: ICD-10-CM

## 2024-10-05 DIAGNOSIS — J06.9 ACUTE RESPIRATORY DISEASE: Primary | ICD-10-CM

## 2024-10-05 LAB
S PYO AG THROAT QL: NEGATIVE
SARS-COV-2 AG UPPER RESP QL IA: NEGATIVE
VALID CONTROL: NORMAL

## 2024-10-05 PROCEDURE — 99213 OFFICE O/P EST LOW 20 MIN: CPT | Performed by: PHYSICIAN ASSISTANT

## 2024-10-05 PROCEDURE — 87880 STREP A ASSAY W/OPTIC: CPT | Performed by: PHYSICIAN ASSISTANT

## 2024-10-05 PROCEDURE — 87811 SARS-COV-2 COVID19 W/OPTIC: CPT | Performed by: PHYSICIAN ASSISTANT

## 2024-10-05 RX ORDER — BROMPHENIRAMINE MALEATE, PSEUDOEPHEDRINE HYDROCHLORIDE, AND DEXTROMETHORPHAN HYDROBROMIDE 2; 30; 10 MG/5ML; MG/5ML; MG/5ML
10 SYRUP ORAL 3 TIMES DAILY PRN
Qty: 473 ML | Refills: 0 | Status: SHIPPED | OUTPATIENT
Start: 2024-10-05

## 2024-10-05 RX ORDER — ALBUTEROL SULFATE 90 UG/1
2 INHALANT RESPIRATORY (INHALATION) EVERY 6 HOURS PRN
Qty: 8.5 G | Refills: 0 | Status: SHIPPED | OUTPATIENT
Start: 2024-10-05

## 2024-10-05 NOTE — PROGRESS NOTES
[facility]        NAME: Shira Monte is a 43 y.o. female  : 1980    MRN: [id number]  DATE: 2024  TIME: 2:39 PM    Assessment and Plan   Acute respiratory disease [J06.9]  1. Acute respiratory disease  brompheniramine-pseudoephedrine-DM 30-2-10 MG/5ML syrup    albuterol (ProAir HFA) 90 mcg/act inhaler      2. Fever, unspecified fever cause  POCT rapid strepA    Poct Covid 19 Rapid Antigen Test            Patient Instructions     Bromfed DM as prescribed  Vitamin D3 2000 IU daily  Vitamin C 1000mg twice per day  Multivitamin daily  Some studies suggest that Zinc 12.5-15mg every 2 hours while awake x 5 days may shorten the duration cold symptoms by 1-2 days.   Fluids and rest  Nasal saline spray  Tylenol/Ibuprofen for pain/fever  Salt water gargles and chloraseptic spray  Throat Coat Tea  Warm compresses over sinuses  Steam treatment (utilize proper safety precautions when in contact with hot water/steam)  Follow up with PCP in 3-5 days.  Proceed to  ER if symptoms worsen.    If tests have been performed at [facility], our office will contact you with results if changes need to be made to the care plan discussed with you at the visit.  You can review your full results on [facility].    Chief Complaint   No chief complaint on file.        History of Present Illness       URI   This is a new problem. Episode onset: 4 days. Associated symptoms include coughing and a sore throat. Pertinent negatives include no abdominal pain, congestion, diarrhea, ear pain, headaches, nausea, rash, rhinorrhea, sinus pain, sneezing, vomiting or wheezing. Treatments tried: tylenol cold and flu.       Review of Systems   Review of Systems   Constitutional:  Positive for fatigue and fever (Tmax 101). Negative for chills.   HENT:  Positive for sore throat. Negative for congestion, ear discharge, ear pain, postnasal drip, rhinorrhea, sinus pressure, sinus pain, sneezing and trouble swallowing.     Respiratory:  Positive for cough and chest tightness. Negative for shortness of breath and wheezing.    Gastrointestinal:  Negative for abdominal pain, diarrhea, nausea and vomiting.   Musculoskeletal:  Negative for myalgias.   Skin:  Negative for rash.   Neurological:  Negative for light-headedness and headaches.         Current Medications       Current Outpatient Medications:     albuterol (ProAir HFA) 90 mcg/act inhaler, Inhale 2 puffs every 6 (six) hours as needed for shortness of breath, Disp: 8.5 g, Rfl: 0    albuterol (PROVENTIL HFA,VENTOLIN HFA) 90 mcg/act inhaler, Inhale 2 puffs every 6 (six) hours as needed for wheezing, Disp: , Rfl:     brompheniramine-pseudoephedrine-DM 30-2-10 MG/5ML syrup, Take 10 mL by mouth 3 (three) times a day as needed for cough or congestion, Disp: 473 mL, Rfl: 0    budesonide-formoterol (SYMBICORT) 80-4.5 MCG/ACT inhaler, Inhale 2 puffs 2 (two) times a day Rinse mouth after use., Disp: , Rfl:     cholecalciferol (VITAMIN D3) 1,000 units tablet, Take 1,000 Units by mouth daily, Disp: , Rfl:     FLUoxetine (PROzac) 10 MG tablet, Take 10 mg by mouth daily, Disp: , Rfl:     folic acid (KP Folic Acid) 1 mg tablet, Take 1 tablet (1 mg total) by mouth daily, Disp: 90 tablet, Rfl: 2    methotrexate 2.5 MG tablet, Take 8 tablets on the same day every week; 4 in the morning, 4 in the evening., Disp: 64 tablet, Rfl: 3    omeprazole (PriLOSEC) 20 mg delayed release capsule, Take 20 mg by mouth if needed, Disp: , Rfl:     ondansetron (ZOFRAN) 4 mg tablet, Take 4 mg by mouth every 8 (eight) hours as needed for nausea or vomiting, Disp: , Rfl:     cyclobenzaprine (FLEXERIL) 5 mg tablet, Take 5 mg by mouth 3 (three) times a day as needed for muscle spasms (Patient not taking: Reported on 10/5/2024), Disp: , Rfl:     ferrous sulfate 325 (65 Fe) mg tablet, Take 325 mg by mouth daily with breakfast (Patient not taking: Reported on 10/5/2024), Disp: , Rfl:     gabapentin (NEURONTIN) 300 mg  capsule, Take 300 mg by mouth daily at bedtime (Patient not taking: Reported on 10/5/2024), Disp: , Rfl:     magnesium gluconate (MAGONATE) 500 mg tablet, Take 500 mg by mouth 2 (two) times a day (Patient not taking: Reported on 7/31/2024), Disp: , Rfl:     meloxicam (Mobic) 15 mg tablet, Take 1 tablet (15 mg total) by mouth daily (Patient not taking: Reported on 7/31/2024), Disp: 30 tablet, Rfl: 2    ondansetron (ZOFRAN-ODT) 4 mg disintegrating tablet, , Disp: , Rfl:     pantoprazole (PROTONIX) 40 mg tablet, Take 40 mg by mouth daily (Patient not taking: Reported on 7/31/2024), Disp: , Rfl:     Current Allergies     Allergies as of 10/05/2024 - Reviewed 10/05/2024   Allergen Reaction Noted    Azithromycin GI Intolerance and Nausea Only 09/12/2022            The following portions of the patient's history were reviewed and updated as appropriate: allergies, current medications, past family history, past medical history, past social history, past surgical history and problem list.     Past Medical History:   Diagnosis Date    Abnormal Pap smear of cervix     Abnormal uterine bleeding (AUB)     Anemia     Arthritis     Asthma     Cancer (HCC)     melanoma    Chronic pain disorder     back    COVID-19 08/2021    mild s/s-fully vaccinated    GERD (gastroesophageal reflux disease)     History of transfusion     Melanoma (HCC) 8/24/22    Pneumonia     as a child    Shortness of breath     allergies trigger/on exersion    Varicella     Wears contact lenses     Wears glasses        Past Surgical History:   Procedure Laterality Date    APPENDECTOMY  2011    BREAST BIOPSY      CHOLECYSTECTOMY  2011    COLONOSCOPY      EGD      ENDOMETRIAL ABLATION N/A 2021    HERNIA REPAIR  2011    umbilical hernia    INTESTINAL MALROTATION REPAIR N/A 11/2015    MYOMECTOMY      VA CYSTOURETHROSCOPY N/A 10/19/2023    Procedure: CYSTOSCOPY;  Surgeon: Montse Smart MD;  Location: [facility];  Service: Gynecology    VA LAPS TOTAL HYSTERECT  250 GM/< W/RMVL TUBE/OVARY N/A 10/19/2023    Procedure: HYSTERECTOMY LAPAROSCOPIC TOTAL (LTH) WITH BILATERAL SALPINGECTOMY ;  Surgeon: Montse Smart MD;  Location: [facility];  Service: Gynecology    SKIN BIOPSY  8/11/22    SKIN LESION EXCISION N/A 10/07/2022    Procedure: WIDE EXCISION OF UPPER BACK MELANOMA;  Surgeon: Joy Gallagher MD;  Location: [facility];  Service: Surgical Oncology    SPINAL FUSION N/A 11/2005    with [place] shira-       Family History   Problem Relation Age of Onset    Hypertension Mother     Diverticulosis Mother     Irritable bowel syndrome Mother     Heart attack Mother     Osteoarthritis Mother     Osteoporosis Mother     Diabetes Father     Hypertension Father     Asthma Father     Heart attack Father     Heart disease Father     Colon cancer Maternal Grandmother     Cancer Maternal Grandmother     Cancer Maternal Grandfather         Lung cancer    Diabetes Paternal Grandfather     Cancer Maternal Aunt         Skin - melanoma    Cancer Maternal Uncle         Skin - melanoma    Cancer Maternal Uncle         Skin - melanoma    Colon cancer Other     Breast cancer Paternal Aunt 40 - 49         Medications have been verified.        Objective   /78   Pulse 89   Temp 97.8 °F (36.6 °C)   Resp 20   LMP 10/08/2023 Comment: aub ongoing since june  SpO2 99%   Patient's last menstrual period was 10/08/2023.       Physical Exam     Physical Exam  Vitals reviewed.   Constitutional:       General: She is not in acute distress.     Appearance: She is well-developed. She is not diaphoretic.   HENT:      Head: Normocephalic and atraumatic.      Right Ear: Tympanic membrane, ear canal and external ear normal.      Left Ear: Tympanic membrane, ear canal and external ear normal.      Nose: Nose normal.      Mouth/Throat:      Pharynx: No oropharyngeal exudate or posterior oropharyngeal erythema.   Eyes:      General:         Right eye: No discharge.         Left eye: No discharge.    Cardiovascular:      Rate and Rhythm: Normal rate and regular rhythm.      Heart sounds: Normal heart sounds. No murmur heard.     No friction rub. No gallop.   Pulmonary:      Effort: Pulmonary effort is normal. No respiratory distress.      Breath sounds: Normal breath sounds. No wheezing, rhonchi or rales.   Lymphadenopathy:      Cervical: No cervical adenopathy.   Skin:     General: Skin is warm.      Findings: No rash.   Neurological:      Mental Status: She is alert.   Psychiatric:         Behavior: Behavior normal.         Thought Content: Thought content normal.         Judgment: Judgment normal.

## 2024-10-05 NOTE — LETTER
October 5, 2024     Patient: Shira Monte   YOB: 1980   Date of Visit: 10/5/2024       To Whom It May Concern:    Please excuse Shira Monte from work on 10/6/2024.     If you have any questions or concerns, please don't hesitate to call.         Sincerely,        Alyce Wyman PA-C    CC: No Recipients

## 2024-10-05 NOTE — PATIENT INSTRUCTIONS
Bromfed DM as prescribed  Vitamin D3 2000 IU daily  Vitamin C 1000mg twice per day  Multivitamin daily  Some studies suggest that Zinc 12.5-15mg every 2 hours while awake x 5 days may shorten the duration cold symptoms by 1-2 days.   Fluids and rest  Nasal saline spray  Tylenol/Ibuprofen for pain/fever  Salt water gargles and chloraseptic spray  Throat Coat Tea  Warm compresses over sinuses  Steam treatment (utilize proper safety precautions when in contact with hot water/steam)  Follow up with PCP in 3-5 days.  Proceed to  ER if symptoms worsen.    If tests have been performed at [facility], our office will contact you with results if changes need to be made to the care plan discussed with you at the visit.  You can review your full results on [facility].

## 2024-10-15 LAB
ALBUMIN SERPL-MCNC: 4.1 G/DL (ref 3.5–5.7)
ALP SERPL-CCNC: 44 U/L (ref 35–120)
ALT SERPL-CCNC: 15 U/L
AST SERPL-CCNC: 16 U/L
BASOPHILS # BLD AUTO: 0.1 THOU/CMM (ref 0–0.1)
BASOPHILS NFR BLD AUTO: 1 %
BILIRUB DIRECT SERPL-MCNC: 0.1 MG/DL (ref 0–0.2)
BILIRUB SERPL-MCNC: 0.4 MG/DL (ref 0.2–1)
CALCIUM SERPL-MCNC: 9.4 MG/DL (ref 8.5–10.1)
CREAT SERPL-MCNC: 0.59 MG/DL (ref 0.4–1.1)
CYTOLOGY CMNT CVX/VAG CYTO-IMP: NORMAL
DIFFERENTIAL METHOD BLD: ABNORMAL
EOSINOPHIL # BLD AUTO: 0.1 THOU/CMM (ref 0–0.5)
EOSINOPHIL NFR BLD AUTO: 1 %
ERYTHROCYTE [DISTWIDTH] IN BLOOD BY AUTOMATED COUNT: 14.3 % (ref 12–16)
GFR/BSA.PRED SERPLBLD CYS-BASED-ARV: 115 ML/MIN/{1.73_M2}
HCT VFR BLD AUTO: 41.5 % (ref 35–43)
HGB BLD-MCNC: 13.8 G/DL (ref 11.5–14.5)
LYMPHOCYTES # BLD AUTO: 1.5 THOU/CMM (ref 1–3)
LYMPHOCYTES NFR BLD AUTO: 29 %
MCH RBC QN AUTO: 33.8 PG (ref 26–34)
MCHC RBC AUTO-ENTMCNC: 33.2 G/DL (ref 32–37)
MCV RBC AUTO: 102 FL (ref 80–100)
MONOCYTES # BLD AUTO: 0.2 THOU/CMM (ref 0.3–1)
MONOCYTES NFR BLD AUTO: 5 %
NEUTROPHILS # BLD AUTO: 3.3 THOU/CMM (ref 1.8–7.8)
NEUTROPHILS NFR BLD AUTO: 64 %
PLATELET # BLD AUTO: 225 THOU/CMM (ref 140–350)
PMV BLD REES-ECKER: 8.8 FL (ref 7.5–11.3)
PROT SERPL-MCNC: 6.2 G/DL (ref 6.3–8.3)
RBC # BLD AUTO: 4.08 MILL/CMM (ref 3.7–4.7)
WBC # BLD AUTO: 5.1 THOU/CMM (ref 4–10)

## 2024-11-29 ENCOUNTER — TELEMEDICINE (OUTPATIENT)
Dept: RHEUMATOLOGY | Facility: CLINIC | Age: 44
End: 2024-11-29
Payer: COMMERCIAL

## 2024-11-29 DIAGNOSIS — M19.90 INFLAMMATORY ARTHRITIS: Primary | ICD-10-CM

## 2024-11-29 DIAGNOSIS — M35.9 UNDIFFERENTIATED CONNECTIVE TISSUE DISEASE (HCC): ICD-10-CM

## 2024-11-29 PROCEDURE — 99215 OFFICE O/P EST HI 40 MIN: CPT | Performed by: STUDENT IN AN ORGANIZED HEALTH CARE EDUCATION/TRAINING PROGRAM

## 2024-11-29 RX ORDER — FOLIC ACID 1 MG/1
3 TABLET ORAL DAILY
Qty: 270 TABLET | Refills: 2 | Status: SHIPPED | OUTPATIENT
Start: 2024-11-29

## 2024-11-29 RX ORDER — METHOTREXATE 2.5 MG/1
TABLET ORAL
Qty: 64 TABLET | Refills: 3 | Status: SHIPPED | OUTPATIENT
Start: 2024-11-29

## 2024-11-29 NOTE — PATIENT INSTRUCTIONS
Increase folic acid to 3 mg daily    Get blood work (GFR, creatinine, calcium, LFTs, CBC) every 3 months while on methotrexate. You are next due around mid-January.    While on your prescribed rheumatologic medication(s) methotrexate: you must STOP the medication if you fall ill (ex: a viral infection, bacterial infection) and not restart it until your illness is resolved and/or you are finished with any antibiotics/antivirals/antifungals that are prescribed for you, whichever happens last.    While on the medication(s), if you are to get a vaccine, you may have to STOP the medication before and after your vaccination. See below for how long to stop your medication according to how often you take it.    LIVE ATTENUATED VACCINES (ex: MMR, rotavirus, smallpox, chickenpox, yellow fever)    Hold before vaccine Hold after vaccine   Steroids 4 weeks 4 weeks   Methotrexate  Azathioprine  Sulfasalazine  Leflunomide  Mycophenolate  Cyclophosphamide (oral) 4 weeks    Calcineurin inhibitors such as:  Tacrolimus  Cyclosporine  Voclosporin 4 weeks    REBEKAH inhibitors such as:  Tofacitanib  Upadacitinib  Ruxolitinib  Baricitinib 1 week    TNF inhibitors such as:  Adalimumab and biosimilars  Infliximab  Etanercept  Golimumab  Certolizumab 1 dosing interval (ex: for an every 4 week medication, hold for 4 weeks)    IL-17 inhibitors such as:  Ixekizumab  Secukinumab 1 dosing interval (ex: for an every 4 week medication, hold for 4 weeks)    IL-12/23 inhibitors such as:  Ustekinumab 1 dosing interval (ex: for an every 4 week medication, hold for 4 weeks)    IL-23 inhibitors such as:  Guselkumab  Rizankizumab 1 dosing interval (ex: for an every 4 week medication, hold for 4 weeks)    Belimumab 1 dosing interval (ex: for an every 4 week medication, hold for 4 weeks)    IL-6 inhibitors such as:  Tocilizumab  Sarilumab 1 dosing interval (ex: for an every 4 week medication, hold for 4 weeks)    IL-1 inhibitors such  as:  Anakinra  Rilonacept  Canakinumab 1 dosing interval (ex: for an every 4 week medication, hold for 4 weeks)    Abatacept 1 dosing interval (ex: for an every 4 week medication, hold for 4 weeks)    Cyclophosphamide (IV) 1 dosing interval (ex: for an every 4 week medication, hold for 4 weeks)    Rituximab 6 months    IVI-400 mg/kg  1000 mg/kg  2000 mg/kg   8 months  10 months  11 months      COVID-19 VACCINE    Instructions   Abatacept (IV) Time vaccine so it is given 2 weeks before next scheduled abatacept dose, then receive abatacept as scheduled   Abatacept (SQ) Delay abatacept dose for 2 weeks after vaccine   Belimumab Delay belimumab dose for 2 weeks after vaccine   Cyclophosphamide (IV) Time cyclophosphamide administration so that it will occur 1 week after each vaccine dose   Hydroxychloroquine No changes to hydroxychloroquine timing or vaccine timing   IVIG No changes to IVIG timing or vaccine timing   Rituximab Time vaccine so that it is given 2 weeks before the next scheduled rituximab dose, then receive rituximab as scheduled   All others Delay rheumatologic medication dose for 2 weeks after vaccine, if disease activity allows     INFLUENZA VACCINE    Hold before vaccine Hold after vaccine   Methotrexate 1 week 2 weeks if able   Rituximab  n/a 2 weeks   All others CONTINUE, DO NOT HOLD CONTINUE, DO NOT HOLD     ALL OTHER VACCINES    Hold before vaccine Hold after vaccine   Methotrexate CONTINUE, DO NOT HOLD CONTINUE, DO NOT HOLD   Rituximab  n/a Time vaccination for when next dose is due, then give rituximab at least 2 weeks after vaccine   All others CONTINUE, DO NOT HOLD CONTINUE, DO NOT HOLD     These instructions are consistent with the recommendations set forth by the American College of Rheumatology (ACR).    Vinicio AR, Ade E, Harry EA, Corinna CO, Camille L, Marvin LC, Ousmane SR, Alexandre LF, Dee KL, Sachin RJ, Luther LR, Jamie R, Jayson SIMON Jr, Cici CASILLASTL, Wilmar ALARCON,  Eliezer PJ, Leelee I, Patel KB, Ashok G, Pete V, Binu BJ, Davy JA, Allie TP, Chas CANO, Camille SANTOS, Salvatore JS, Emir J, Herman MC, Saida E, Fady P, Juanita KS, Monty A, Anastasia MS, Jer MM, Juliana NUNEZ, Shin N, Jericho N, Panfilo H, Orlando M, Camacho YAO, Lenin J. 2022 American College of Rheumatology Guideline for Vaccinations in Patients With Rheumatic and Musculoskeletal Diseases. Arthritis Care Res ([place]). 2023 Mar;75(3):449-464. doi: 10.1002/acr.75336. Epub 2023 Jan 4. PMID: 54862871; PMCID: PRQ63840059.     Wilmar JR, Ousmane SR, Van DD, Sachin RJ, Luther LR, Vinicio AR, Camille SANTOS, Jakob EM, Arthur R, Олег A, Juliana NUNEZ, Camacho YAO, Chas CANO, Nicole TR. American College of Rheumatology Guidance for COVID-19 Vaccination in Patients With Rheumatic and Musculoskeletal Diseases: Version 5. Arthritis Rheumatol. 2023 Jan;75(1):E1-E16. doi: 10.1002/art.13226. Epub 2022 Nov 8. PMID: 97677617; PMCID: [id number].      PAST SURGICAL HISTORY:  H/O ileostomy reversal 12/2017    History of low anterior resection of rectum Surgeon Dr Moore performed 8/4/17

## 2024-11-29 NOTE — PROGRESS NOTES
Name: Shira Monte      : 1980      MRN: [id number]  Encounter Provider: Amauyr Quintanilla DO  Encounter Date: 2024   Encounter department: [facility] [place]  :  Assessment & Plan  Inflammatory arthritis  Markedly improved on methotrexate     Increase folic acid due to hair loss  Orders:    folic acid (KP Folic Acid) 1 mg tablet; Take 3 tablets (3 mg total) by mouth daily    methotrexate 2.5 MG tablet; Take 8 tablets on the same day every week; 4 in the morning, 4 in the evening.    Undifferentiated connective tissue disease (HCC)  No obvious new CTD signs, and rash improved markedly with methotrexate as wel       Patient's rheumatologic disease(s) threaten long-term function if not appropriately managed.    Patient's rheumatologic medication(s) require intensive monitoring for toxicity. Side effects as above, increasing folic acid      History of Present Illness     Has had a lot of hair thinning since increasing the methotrexate dose.    Joint symptoms stable    Still occasional redness on hands, face, chest. Not as often as before, only happened once since starting the methotrexate     Dry eye, saw a doctor about that and is getting tear duct inj in Dec    Denies:  Fever  Rash other than above  Oral/nasal/genital ulcers  Muscle weakness  Uveitis  Dactylitis  Dysphagia/odynophagia  CP  PERALES  SOB at rest  Hematochezia  Gross hematuria  Foamy urine  Joint issues other than noted above      Permanent History: First saw me 2024 for joint stiffness for many years and worsening, intermittent finger puffiness worse in the mornings, SCOTT 1:320, intermittent rash on face/hands/upper chest. Given symptoms was concerning for possible early inflammatory arthritis and/or early SSc vs amyopathic DM. Started methotrexate.     Other significant PMH melanoma     Objective   LMP 10/08/2023 Comment: aub ongoing since     well-appearing, no acute distress     saw a doctor about that and is getting tear duct inj in Dec    Denies:  Fever  Rash other than above  Oral/nasal/genital ulcers  Muscle weakness  Uveitis  Dactylitis  Dysphagia/odynophagia  CP  PERALES  SOB at rest  Hematochezia  Gross hematuria  Foamy urine  Joint issues other than noted above      Permanent History: First saw me Jan 2024 for joint stiffness for many years and worsening, intermittent finger puffiness worse in the mornings, SCOTT 1:320, intermittent rash on face/hands/upper chest. Given symptoms was concerning for possible early inflammatory arthritis and/or early SSc vs amyopathic DM. Started methotrexate.     Other significant PMH melanoma     Objective   LMP 10/08/2023 Comment: aub ongoing since june    well-appearing, no acute distress

## 2024-11-29 NOTE — ASSESSMENT & PLAN NOTE
Markedly improved on methotrexate     Increase folic acid due to hair loss  Orders:    folic acid (KP Folic Acid) 1 mg tablet; Take 3 tablets (3 mg total) by mouth daily    methotrexate 2.5 MG tablet; Take 8 tablets on the same day every week; 4 in the morning, 4 in the evening.

## 2024-12-04 ENCOUNTER — TELEPHONE (OUTPATIENT)
Dept: PAIN MEDICINE | Facility: CLINIC | Age: 44
End: 2024-12-04

## 2024-12-26 ENCOUNTER — OFFICE VISIT (OUTPATIENT)
Dept: URGENT CARE | Facility: CLINIC | Age: 44
End: 2024-12-26
Payer: COMMERCIAL

## 2024-12-26 ENCOUNTER — APPOINTMENT (OUTPATIENT)
Dept: RADIOLOGY | Facility: CLINIC | Age: 44
End: 2024-12-26
Payer: COMMERCIAL

## 2024-12-26 ENCOUNTER — RESULTS FOLLOW-UP (OUTPATIENT)
Dept: URGENT CARE | Facility: CLINIC | Age: 44
End: 2024-12-26

## 2024-12-26 VITALS
SYSTOLIC BLOOD PRESSURE: 140 MMHG | TEMPERATURE: 98.6 F | DIASTOLIC BLOOD PRESSURE: 86 MMHG | HEART RATE: 84 BPM | RESPIRATION RATE: 18 BRPM | OXYGEN SATURATION: 100 %

## 2024-12-26 DIAGNOSIS — S19.9XXA NECK INJURY, INITIAL ENCOUNTER: ICD-10-CM

## 2024-12-26 DIAGNOSIS — S16.1XXA STRAIN OF NECK MUSCLE, INITIAL ENCOUNTER: Primary | ICD-10-CM

## 2024-12-26 PROCEDURE — 72040 X-RAY EXAM NECK SPINE 2-3 VW: CPT

## 2024-12-26 PROCEDURE — 99213 OFFICE O/P EST LOW 20 MIN: CPT | Performed by: PHYSICIAN ASSISTANT

## 2024-12-26 RX ORDER — METHOCARBAMOL 750 MG/1
750 TABLET, FILM COATED ORAL EVERY 6 HOURS PRN
Qty: 20 TABLET | Refills: 0 | Status: SHIPPED | OUTPATIENT
Start: 2024-12-26

## 2024-12-26 NOTE — PATIENT INSTRUCTIONS
Take Robaxin every 6 hrs as needed to relax neck muscles  Take Tylenol 650 mg and/or Ibuprofen 400 mg for pain  Heating pad followed by ice  If symptoms fail to improve follow up with PCP    If tests have been performed at [facility], our office will contact you with results if changes need to be made to the care plan discussed with you at the visit.  You can [facility]

## 2024-12-26 NOTE — PROGRESS NOTES
[facility]        NAME: Shira Monte is a 44 y.o. female  : 1980    MRN: [id number]  DATE: 2024  TIME: 11:15 AM    Assessment and Plan   Strain of neck muscle, initial encounter [S16.1XXA]  1. Strain of neck muscle, initial encounter  methocarbamol (Robaxin-750) 750 mg tablet      2. Neck injury, initial encounter  XR spine cervical 2 or 3 vw injury        Red flag symptoms for ER discussed with patient    Patient Instructions     Take Robaxin every 6 hrs as needed to relax neck muscles  Take Tylenol and/or Ibuprofen for pain  Heating pad followed by ice  If symptoms fail to improve follow up with PCP    Follow up with PCP in 3-5 days.  Proceed to  ER if symptoms worsen.    If tests have been performed at [facility], our office will contact you with results if changes need to be made to the care plan discussed with you at the visit.  You can review your full results on [facility].    Chief Complaint     Chief Complaint   Patient presents with    Head Injury     Pain posterior scalp slipped and fell on ice 24 at 2000 hrs denies loc no open areas seen denies blood thinner use gait steady c/o Stiff neck          History of Present Illness       Patient presents with a stiff neck after she slipped and fell approximately 38 hours ago striking her head when she slipped on ice.  Denies loss of consciousness.  Denies use of blood thinners.  Having a 4-5/10 headache which she states is annoying.  Taking Motrin as needed for pain.  Her most troublesome symptom is neck pain.  States it is painful for her to move her neck as her muscles are very tight.  Denies muscle weakness or paresthesias, nausea, vomiting, blurred vision, double vision, dizziness, difficulty concentrating.        Review of Systems   Review of Systems   Eyes:  Negative for visual disturbance.   Gastrointestinal:  Negative for nausea and vomiting.   Musculoskeletal:  Positive for neck pain.   Neurological:   Positive for headaches (4-5/10). Negative for syncope, weakness, light-headedness and numbness.         Current Medications       Current Outpatient Medications:     albuterol (ProAir HFA) 90 mcg/act inhaler, Inhale 2 puffs every 6 (six) hours as needed for shortness of breath, Disp: 8.5 g, Rfl: 0    budesonide-formoterol (SYMBICORT) 80-4.5 MCG/ACT inhaler, Inhale 2 puffs 2 (two) times a day Rinse mouth after use., Disp: , Rfl:     cholecalciferol (VITAMIN D3) 1,000 units tablet, Take 1,000 Units by mouth daily, Disp: , Rfl:     FLUoxetine (PROzac) 10 MG tablet, Take 10 mg by mouth daily, Disp: , Rfl:     folic acid (KP Folic Acid) 1 mg tablet, Take 3 tablets (3 mg total) by mouth daily, Disp: 270 tablet, Rfl: 2    methocarbamol (Robaxin-750) 750 mg tablet, Take 1 tablet (750 mg total) by mouth every 6 (six) hours as needed for muscle spasms, Disp: 20 tablet, Rfl: 0    methotrexate 2.5 MG tablet, Take 8 tablets on the same day every week; 4 in the morning, 4 in the evening., Disp: 64 tablet, Rfl: 3    omeprazole (PriLOSEC) 20 mg delayed release capsule, Take 20 mg by mouth if needed, Disp: , Rfl:     albuterol (PROVENTIL HFA,VENTOLIN HFA) 90 mcg/act inhaler, Inhale 2 puffs every 6 (six) hours as needed for wheezing, Disp: , Rfl:     brompheniramine-pseudoephedrine-DM 30-2-10 MG/5ML syrup, Take 10 mL by mouth 3 (three) times a day as needed for cough or congestion, Disp: 473 mL, Rfl: 0    cyclobenzaprine (FLEXERIL) 5 mg tablet, Take 5 mg by mouth 3 (three) times a day as needed for muscle spasms (Patient not taking: Reported on 10/5/2024), Disp: , Rfl:     ferrous sulfate 325 (65 Fe) mg tablet, Take 325 mg by mouth daily with breakfast (Patient not taking: Reported on 10/5/2024), Disp: , Rfl:     gabapentin (NEURONTIN) 300 mg capsule, Take 300 mg by mouth daily at bedtime (Patient not taking: Reported on 10/5/2024), Disp: , Rfl:     magnesium gluconate (MAGONATE) 500 mg tablet, Take 500 mg by mouth 2 (two) times a  day (Patient not taking: Reported on 7/31/2024), Disp: , Rfl:     ondansetron (ZOFRAN) 4 mg tablet, Take 4 mg by mouth every 8 (eight) hours as needed for nausea or vomiting, Disp: , Rfl:     ondansetron (ZOFRAN-ODT) 4 mg disintegrating tablet, , Disp: , Rfl:     pantoprazole (PROTONIX) 40 mg tablet, Take 40 mg by mouth daily (Patient not taking: Reported on 7/31/2024), Disp: , Rfl:     Current Allergies     Allergies as of 12/26/2024 - Reviewed 12/26/2024   Allergen Reaction Noted    Azithromycin GI Intolerance and Nausea Only 09/12/2022            The following portions of the patient's history were reviewed and updated as appropriate: allergies, current medications, past family history, past medical history, past social history, past surgical history and problem list.     Past Medical History:   Diagnosis Date    Abnormal Pap smear of cervix     Abnormal uterine bleeding (AUB)     Anemia     Arthritis     Asthma     Cancer (HCC)     melanoma    Chronic pain disorder     back    COVID-19 08/2021    mild s/s-fully vaccinated    GERD (gastroesophageal reflux disease)     History of transfusion     Melanoma (HCC) 8/24/22    Pneumonia     as a child    Shortness of breath     allergies trigger/on exersion    Varicella     Wears contact lenses     Wears glasses        Past Surgical History:   Procedure Laterality Date    APPENDECTOMY  2011    BREAST BIOPSY      CHOLECYSTECTOMY  2011    COLONOSCOPY      EGD      ENDOMETRIAL ABLATION N/A 2021    HERNIA REPAIR  2011    umbilical hernia    INTESTINAL MALROTATION REPAIR N/A 11/2015    MYOMECTOMY      AZ CYSTOURETHROSCOPY N/A 10/19/2023    Procedure: CYSTOSCOPY;  Surgeon: Montse Smart MD;  [facility]: [facility];  Service: Gynecology    AZ LAPS TOTAL HYSTERECT 250 GM/< W/RMVL TUBE/OVARY N/A 10/19/2023    Procedure: HYSTERECTOMY LAPAROSCOPIC TOTAL (LTH) WITH BILATERAL SALPINGECTOMY ;  Surgeon: Montse Smart MD;  Location: [facility];  Service: Gynecology    SKIN BIOPSY   8/11/22    SKIN LESION EXCISION N/A 10/07/2022    Procedure: WIDE EXCISION OF UPPER BACK MELANOMA;  Surgeon: Joy Gallagher MD;  [facility]: [facility];  Service: Surgical Oncology    SPINAL FUSION N/A 11/2005    with [place] [facility]-       Family History   Problem Relation Age of Onset    Hypertension Mother     Diverticulosis Mother     Irritable bowel syndrome Mother     Heart attack Mother     Osteoarthritis Mother     Osteoporosis Mother     Diabetes Father     Hypertension Father     Asthma Father     Heart attack Father     Heart disease Father     Colon cancer Maternal Grandmother     Cancer Maternal Grandmother     Cancer Maternal Grandfather         Lung cancer    Diabetes Paternal Grandfather     Cancer Maternal Aunt         Skin - melanoma    Cancer Maternal Uncle         Skin - melanoma    Cancer Maternal Uncle         Skin - melanoma    Colon cancer Other     Breast cancer Paternal Aunt 40 - 49         Medications have been verified.        Objective   /86   Pulse 84   Temp 98.6 °F (37 °C)   Resp 18   LMP 10/08/2023 Comment: aub ongoing since june  SpO2 100%   Patient's last menstrual period was 10/08/2023.       Physical Exam     Physical Exam  Vitals and nursing note reviewed.   Constitutional:       Appearance: Normal appearance.   HENT:      Head: Normocephalic and atraumatic.      Comments: Scalp tenderness, no crepitus, no open wounds or bruising     Mouth/Throat:      Mouth: Mucous membranes are moist.      Pharynx: Oropharynx is clear.   Cardiovascular:      Rate and Rhythm: Normal rate.   Pulmonary:      Effort: Pulmonary effort is normal.   Musculoskeletal:      Comments: Left elbow with soft tissue tenderness and ecchymosis over the proximal ulna.  No pain on palpation of the olecranon process medial or lateral epicondyle.  No pain with supination or pronation.  Full range of motion at elbow.    Cervical spine without tenderness to palpation.  There is tightness and spasm of  bilateral trapezius.  Upper and lower extremities muscle strength 5/5 bilaterally throughout.  Upper and lower reflexes +2 bilaterally throughout.   Skin:     General: Skin is warm.   Neurological:      General: No focal deficit present.      Mental Status: She is alert and oriented to person, place, and time.      Cranial Nerves: No cranial nerve deficit.      Motor: No weakness.      Coordination: Coordination normal.      Gait: Gait normal.      Deep Tendon Reflexes: Reflexes normal.     Xray independently reviewed by me contemporaneously as degenerative changes best seen at C5-6, C6-7; straightening of cervical lordosis. Awaiting radiology interpretation.

## 2024-12-26 NOTE — LETTER
December 26, 2024     Patient: Shira Monte   YOB: 1980   Date of Visit: 12/26/2024       To Whom It May Concern:    It is my medical opinion that Shira Monte may return to work on 12/27/24 .    If you have any questions or concerns, please don't hesitate to call.         Sincerely,        Asuncion Angel PA-C    CC: No Recipients

## 2025-01-23 ENCOUNTER — TELEPHONE (OUTPATIENT)
Dept: PHYSICAL THERAPY | Facility: OTHER | Age: 45
End: 2025-01-23

## 2025-01-23 NOTE — TELEPHONE ENCOUNTER
Patient filled out an online form today 01/23/25 @8:56am due to Mid-lower back pain.    Called patient @9:09am.    V/M left for patient to call back. Phone number and hours of business provided.    Will assist patient when she calls back.    No ref.    Hx of scoliosis  Back surgery in 2005. Spinal fusion and shira placement in the entire spine.

## 2025-03-10 ENCOUNTER — TELEPHONE (OUTPATIENT)
Dept: HEMATOLOGY ONCOLOGY | Facility: CLINIC | Age: 45
End: 2025-03-10

## 2025-03-10 NOTE — TELEPHONE ENCOUNTER
Pt canceled through the text message service for today's appt w/ provider. Pt rs for 4/28 2 200 pm per pt request. She verbally confirmed the new appt

## 2025-03-25 ENCOUNTER — TELEPHONE (OUTPATIENT)
Dept: PHYSICAL THERAPY | Facility: OTHER | Age: 45
End: 2025-03-25

## 2025-03-25 NOTE — TELEPHONE ENCOUNTER
Called the patient per the [facility] online appointment request that was submitted.     Voice message left for patient to call back. Phone number and hours of business provided.     Will explain our program when a call back is received.

## 2025-03-26 ENCOUNTER — NURSE TRIAGE (OUTPATIENT)
Dept: PHYSICAL THERAPY | Facility: OTHER | Age: 45
End: 2025-03-26

## 2025-03-26 DIAGNOSIS — G89.29 CHRONIC BILATERAL LOW BACK PAIN, UNSPECIFIED WHETHER SCIATICA PRESENT: Primary | ICD-10-CM

## 2025-03-26 DIAGNOSIS — M54.50 CHRONIC BILATERAL LOW BACK PAIN, UNSPECIFIED WHETHER SCIATICA PRESENT: Primary | ICD-10-CM

## 2025-03-26 NOTE — TELEPHONE ENCOUNTER
"Patient called into [facility] today 03/26 and left v/m @ 1:56pm.    Returned patient's call @2:35pm.    No ref. Filled out an online form.    Additional Information   Negative: Is this related to a work injury?   Negative: Is this related to an MVA?   Negative: Are you currently recieving homecare services?    Background - Initial Assessment  Clinical complaint: Mid-Lower Back Pain ongoing for \"years\", worsened the past 1-2 months. Pain radiates down into the hips and occasionally down the legs. Patient states she also have pain \"occasionally\" in both shoulders. No numbness or tingling sensation. Hx of scoliosis \"my whole life\". Hx of back sx in 2005- spinal fusion and shira placement in the entire spine. Patient states pain is constant \"everyday\", worse with movements especially, when bending. Was established with PM (out of network). Would like to establish care with [facility] Seen by  in the past for this pain. Not seeing a spine specialist at the moment. Patient described back pain as sharp and stabbing.  Date of onset: Ongoing pain for years, worsened the past 1-2 months.  Frequency of pain: constant  Quality of pain:  sharp, and stabbing    Protocols used: [facility] Protocol    " Patient refused subQ heparin

## 2025-03-26 NOTE — TELEPHONE ENCOUNTER
Additional Information   Negative: Has the patient had unexplained weight loss?   Negative: Does the patient have a fever?   Negative: Is the patient experiencing urine retention?   Negative: Is the patient experiencing acute drop foot or paralysis?   Negative: Has the patient experienced major trauma? (fall from height, high speed collision, direct blow to spine) and is also experiencing nausea, light-headedness, or loss of consciousness?   Negative: Is the patient experiencing blood in sputum?   Affirmative: Is this a chronic condition?    Protocols used: [facility] Protocol    Patient states he pain is chronic for many years and has gotten worse over the last 4-6 months. States she is looking to transfer her care to [facility].    [facility] was reviewed in detail and what we can provide for their back pain.  Patient is agreeable to being triaged and would like to proceed with Physical Therapy.    Referral was placed for Physical Therapy at the [place] site. Patients information was sent to the  to make evaluation appointment. Patient made aware that the PT office  will be calling to schedule the appointment.  Patient was provided with the phone number to the PT office.    No further questions and/or concerns were voiced by the patient at this time. Patient states understanding of the referral that was placed.

## 2025-04-04 ENCOUNTER — OFFICE VISIT (OUTPATIENT)
Dept: OBGYN CLINIC | Facility: CLINIC | Age: 45
End: 2025-04-04
Payer: COMMERCIAL

## 2025-04-04 VITALS
SYSTOLIC BLOOD PRESSURE: 126 MMHG | HEIGHT: 66 IN | WEIGHT: 158.2 LBS | BODY MASS INDEX: 25.43 KG/M2 | DIASTOLIC BLOOD PRESSURE: 66 MMHG

## 2025-04-04 DIAGNOSIS — Z12.31 ENCOUNTER FOR SCREENING MAMMOGRAM FOR MALIGNANT NEOPLASM OF BREAST: ICD-10-CM

## 2025-04-04 DIAGNOSIS — Z01.419 WELL WOMAN EXAM WITH ROUTINE GYNECOLOGICAL EXAM: Primary | ICD-10-CM

## 2025-04-04 PROCEDURE — 99396 PREV VISIT EST AGE 40-64: CPT | Performed by: OBSTETRICS & GYNECOLOGY

## 2025-04-04 NOTE — PROGRESS NOTES
Assessment   44 y.o.  presenting for annual exam.     Plan   Diagnoses and all orders for this visit:    Well woman exam with routine gynecological exam  - Pap not indicated  - Mammo ordered  - Return in 1yr for yearly    Encounter for screening mammogram for malignant neoplasm of breast  -     Mammo screening bilateral w 3d and cad; Future    __________________________________________________________________      Subjective     Shira Monte is a 44 y.o.  with a hx TLH, BS (, CHUCKY, AUB) who is sexually active presenting for annual exam. She is without complaint.    GYN  Complaints: denies  Denies dysmenorrhea, dyspareunia, genital discharge, genital ulcers, pelvic pain, and vulvar/vaginal symptoms  Periods are absent post-hyst  Dysmenorrhea:none.   Cyclic symptoms include none  Sexually active: Yes - single partner - male  Hx STI: denies   Hx Abnormal pap: reports many years prior  Last pap: pre-hyst    OB   ( x2)      Complaints: denies  Denies urinary frequency, hematuria, urinary incontinence, and dysuria    BREAST  Complaints: denies  Denies: breast lump, breast tenderness, changed mole, dryness, nipple discharge, pruritus, rash, skin color change, and skin lesion(s)  Last mammogram: n/a  Personal hx: cyst, stable. Hx biopsy  Family hx: denies fhx of uterine, ovarian cancers  Paternal aunt with breast ca (40s)  Maternal grandmother with colon ca   Mom with osteoporosis (66yo)  Melanoma in multiple family members  Patient does do regular self-exams    GENERAL  PMH reviewed/updated and is as below.   Patient does follow with a PCP.  Sandy Gomez ([place])  Denies domestic violence.  Exercise:   Diet: keto    SCREENING  Cervical Ca: pap not indicated; no cervix  Breast Ca: mammo ordered  Colon Ca: starting age 47      Past Medical History:   Diagnosis Date    Abnormal Pap smear of cervix     Abnormal uterine bleeding (AUB)     Anemia     Arthritis     Asthma     Cancer  (HCC)     melanoma    Chronic pain disorder     back    COVID-19 08/2021    mild s/s-fully vaccinated    GERD (gastroesophageal reflux disease)     History of transfusion     Inflammatory bowel disease     Melanoma (HCC) 8/24/22    Osteoarthritis     Pneumonia     as a child    Shortness of breath     allergies trigger/on exersion    Skin cancer 08/24/22    Varicella     Wears contact lenses     Wears glasses        Past Surgical History:   Procedure Laterality Date    APPENDECTOMY  2011    BACK SURGERY  2005    BREAST BIOPSY      CHOLECYSTECTOMY  2011    COLONOSCOPY      EGD      ENDOMETRIAL ABLATION N/A 2021    HERNIA REPAIR  2011    umbilical hernia    INTESTINAL MALROTATION REPAIR N/A 11/2015    MYOMECTOMY      NC CYSTOURETHROSCOPY N/A 10/19/2023    Procedure: CYSTOSCOPY;  Surgeon: oMntse Smart MD;  [facility];  Service: Gynecology    NC LAPS TOTAL HYSTERECT 250 GM/< W/RMVL TUBE/OVARY N/A 10/19/2023    Procedure: HYSTERECTOMY LAPAROSCOPIC TOTAL (LTH) WITH BILATERAL SALPINGECTOMY ;  Surgeon: Montse Smart MD;  [facility]: [facility];  Service: Gynecology    SKIN BIOPSY  8/11/22    SKIN LESION EXCISION N/A 10/07/2022    Procedure: WIDE EXCISION OF UPPER BACK MELANOMA;  Surgeon: Joy Gallagher MD;  [facility];  Service: Surgical Oncology    SPINAL FUSION N/A 11/2005    with [place] shira-    STOMACH SURGERY  2015    UPPER GASTROINTESTINAL ENDOSCOPY      Not sure of dates         Current Outpatient Medications:     albuterol (ProAir HFA) 90 mcg/act inhaler, Inhale 2 puffs every 6 (six) hours as needed for shortness of breath, Disp: 8.5 g, Rfl: 0    albuterol (PROVENTIL HFA,VENTOLIN HFA) 90 mcg/act inhaler, Inhale 2 puffs every 6 (six) hours as needed for wheezing, Disp: , Rfl:     budesonide-formoterol (SYMBICORT) 80-4.5 MCG/ACT inhaler, Inhale 2 puffs 2 (two) times a day Rinse mouth after use., Disp: , Rfl:     cholecalciferol (VITAMIN D3) 1,000 units tablet, Take 1,000 Units by mouth  "daily, Disp: , Rfl:     FLUoxetine (PROzac) 10 MG tablet, Take 10 mg by mouth daily, Disp: , Rfl:     folic acid (KP Folic Acid) 1 mg tablet, Take 3 tablets (3 mg total) by mouth daily, Disp: 270 tablet, Rfl: 2    methotrexate 2.5 MG tablet, Take 8 tablets on the same day every week; 4 in the morning, 4 in the evening., Disp: 64 tablet, Rfl: 3    omeprazole (PriLOSEC) 20 mg delayed release capsule, Take 20 mg by mouth if needed, Disp: , Rfl:     ondansetron (ZOFRAN) 4 mg tablet, Take 4 mg by mouth every 8 (eight) hours as needed for nausea or vomiting, Disp: , Rfl:       Allergies   Allergen Reactions    Azithromycin GI Intolerance and Nausea Only     nausea and severe side pain       Social History     Tobacco Use    Smoking status: Never    Smokeless tobacco: Never   Vaping Use    Vaping status: Never Used   Substance Use Topics    Alcohol use: Never     Alcohol/week: 2.0 standard drinks of alcohol     Types: 2 Glasses of wine per week     Comment: Not regularly    Drug use: Not Currently     Frequency: 1.0 times per week     Types: Marijuana     Comment: Medical Marijuana for pain             Objective  /66   Ht 5' 6\" (1.676 m)   Wt 71.8 kg (158 lb 3.2 oz)   LMP 10/08/2023 Comment: aub ongoing since june  BMI 25.53 kg/m²      Physical Exam:  Physical Exam  Exam conducted with a chaperone present.   Constitutional:       General: She is not in acute distress.     Appearance: Normal appearance. She is well-developed. She is not ill-appearing, toxic-appearing or diaphoretic.   HENT:      Head: Normocephalic and atraumatic.   Eyes:      General: No scleral icterus.        Right eye: No discharge.         Left eye: No discharge.      Conjunctiva/sclera: Conjunctivae normal.   Cardiovascular:      Rate and Rhythm: Normal rate.   Pulmonary:      Effort: Pulmonary effort is normal. No accessory muscle usage or respiratory distress.   Chest:   Breasts:     Breasts are symmetrical.      Right: No inverted nipple, " mass, nipple discharge, skin change or tenderness.      Left: No inverted nipple, mass, nipple discharge, skin change or tenderness.   Abdominal:      General: There is no distension.      Palpations: Abdomen is soft. There is no mass.      Tenderness: There is no abdominal tenderness. There is no guarding or rebound.   Genitourinary:     General: Normal vulva.      Exam position: Lithotomy position.      Labia:         Right: No rash, tenderness or lesion.         Left: No rash, tenderness or lesion.       Urethra: No prolapse, urethral swelling or urethral lesion.      Vagina: No signs of injury. No vaginal discharge, erythema, tenderness, bleeding or lesions.      Cervix: No cervical motion tenderness (surgically absent cervix. intact, normal appearing vaginal cuff).      Uterus: Absent.       Adnexa:         Right: No mass, tenderness or fullness.          Left: No mass, tenderness or fullness.        Rectum: No external hemorrhoid. Normal anal tone.   Lymphadenopathy:      Upper Body:      Right upper body: No axillary or pectoral adenopathy.      Left upper body: No axillary or pectoral adenopathy.   Skin:     General: Skin is warm and dry.      Findings: No erythema or rash.   Neurological:      Mental Status: She is alert.   Psychiatric:         Mood and Affect: Mood normal.         Behavior: Behavior normal.         Thought Content: Thought content normal.         Judgment: Judgment normal.

## 2025-04-07 ENCOUNTER — EVALUATION (OUTPATIENT)
Dept: PHYSICAL THERAPY | Facility: CLINIC | Age: 45
End: 2025-04-07
Payer: COMMERCIAL

## 2025-04-07 DIAGNOSIS — M54.50 CHRONIC BILATERAL LOW BACK PAIN, UNSPECIFIED WHETHER SCIATICA PRESENT: Primary | ICD-10-CM

## 2025-04-07 DIAGNOSIS — G89.29 CHRONIC BILATERAL LOW BACK PAIN, UNSPECIFIED WHETHER SCIATICA PRESENT: Primary | ICD-10-CM

## 2025-04-07 PROCEDURE — 97162 PT EVAL MOD COMPLEX 30 MIN: CPT | Performed by: PHYSICAL THERAPIST

## 2025-04-07 PROCEDURE — 97110 THERAPEUTIC EXERCISES: CPT | Performed by: PHYSICAL THERAPIST

## 2025-04-07 NOTE — LETTER
2025    Tirso Ayon, PT    Patient: Shira Monte   YOB: 1980   Date of Visit: 2025     Encounter Diagnosis     ICD-10-CM    1. Chronic bilateral low back pain, unspecified whether sciatica present  M54.50     G89.29           Dear Dr. Tirso Ayon, PT:    Thank you for your recent referral of Shira Monte. Please review the attached evaluation summary from Shira's recent visit.     Please verify that you agree with the plan of care by signing the attached order.     If you have any questions or concerns, please do not hesitate to call.     I sincerely appreciate the opportunity to share in the care of one of your patients and hope to have another opportunity to work with you in the near future.       Sincerely,    Tirso Ayon, PT      Referring Provider:      I certify that I have read the below Plan of Care and certify the need for these services furnished under this plan of treatment while under my care.                    Tirso Ayon PT  Via In Basket          PT Evaluation     Today's date: 2025  Patient name: Shira Monte  : 1980  MRN: [id number]  Referring provider: Tirso Ayon PT  Dx:   Encounter Diagnosis     ICD-10-CM    1. Chronic bilateral low back pain, unspecified whether sciatica present  M54.50     G89.29           Start Time: 1500  Stop Time: 1544  Total time in clinic (min): 44 minutes    Assessment  Impairments: abnormal or restricted ROM, activity intolerance, impaired balance, impaired physical strength, lacks appropriate home exercise program, pain with function, poor body mechanics and activity limitations  Symptom irritability: moderate    Assessment details: Patient is a 43 y/o female with chief c/o LBP. Patient presents with chronic R sided low back pain which has progressively worsened over the past few years. She presents with noted tenderness over the R SIJ with positive provocation tests to the R side. She has moderate  "limitations to lumbar spine mobility which is contributed to her history of low back surgeries. She presents with moderate limitations to B/L LE and core/trunk strength per the evaluation today. We spent time reviewing proper lifting mechanics and body mechanics for home and work duties. She had greater limitations to the R LE during strengthening interventions today.     Goals  STGs:  \"Patient will be independent with hep by 2-3 visits.   Decrease low back pain by 25% for improved tolerance with adls and home duties by 3-4 weeks.   Patient will demonstrate adequate body mechanics during work duties and adls allowing for decreased stress to the lumbar spine by 3-4 weeks.     LTGs:  \"Improve FOTO score from 45 to 57 indicating improved tolerance with activities involving the low back by discharge.   Patient will demonstrate rom and strength to the lumbar spine wfl for improved tolerance with adls and home duties by discharge.   Patient will demonstrate B/L Les strength of 4/5 or better for improved tolerance with adls and work duties by discharge.       Plan  Patient would benefit from: skilled physical therapy  Planned modality interventions: cryotherapy and thermotherapy: hydrocollator packs    Planned therapy interventions: abdominal trunk stabilization, ADL retraining, body mechanics training, flexibility, functional ROM exercises, home exercise program, therapeutic exercise, therapeutic activities, stretching, strengthening, postural training, patient education, joint mobilization and manual therapy    Frequency: 2x week  Duration in weeks: 8  Plan of Care beginning date: 4/7/2025  Plan of Care expiration date: 6/2/2025  Treatment plan discussed with: patient  Plan details: Patient informed that from this point forward, to ensure adherence to the aforementioned plan of care, all or some of the treatment may be performed and carried out by a Physical Therapy Assistant (PTA) with supervision from a licensed " Physical Therapist (PT) in accordance with [facility] Physical Therapy Practice Act.  Patient will continue to benefit from skilled physical therapy to address the functional deficits that were identified during the evaluation today. We will continue to progress the therapy program to address these functional deficits and achieve the established goals.              Subjective Evaluation    History of Present Illness  Mechanism of injury: Patient presents to out patient physical therapy with chief c/o LBP. Patient reports a history of scoliosis which was managed with fusion surgery along with placement of rods. She notes that her back will become sore when she is sitting or standing for longer periods of times. She has difficulties with bending and twisting which causes her some increased discomfort in the low back. She reports some occasional numbness and tingling into both hands and also intermittently into the B/L Les when she has increased pain in her low back. She also reports occasional weaknesses into the legs to where she feels as though she is dragging her legs. She is limited to approximately 45 minutes of sitting and 30 minutes of sitting prior to her back pain increasing.           Recurrent probem    Quality of life: good    Patient Goals  Patient goals for therapy: decreased pain and increased motion  Patient goal: Patient wishes to have better control of her pain in her low back and LEs.  Pain  Current pain ratin  At best pain rating: 3  At worst pain ratin  Location: Lumbar  Quality: discomfort, dull ache, throbbing, radiating and sharp  Relieving factors: change in position, heat, rest and medications  Aggravating factors: standing, walking and sitting (repetitive bending, twisting)    Social Support    Employment status: working        Objective     Active Range of Motion     Lumbar   Flexion:  Restriction level: moderate  Extension:  Restriction level: moderate  Left lateral  "flexion:  Restriction level: moderate  Right lateral flexion:  Restriction level: moderate  Left rotation:  Restriction level: moderate  Right rotation:  Restriction level: moderate    Strength/Myotome Testing     Left Hip   Planes of Motion   Flexion: 3+  Extension: 3+  Abduction: 3+  Adduction: 3+  External rotation: 3+  Internal rotation: 3+    Right Hip   Planes of Motion   Flexion: 3+  Extension: 3+  Abduction: 3+  Adduction: 3+  External rotation: 3+  Internal rotation: 3+    Left Knee   Flexion: 4-  Extension: 3+    Right Knee   Flexion: 4-  Extension: 3+    Left Ankle/Foot   Dorsiflexion: 4  Plantar flexion: 4  Inversion: 3+  Eversion: 3+    Right Ankle/Foot   Dorsiflexion: 4  Plantar flexion: 4  Inversion: 3+  Eversion: 3+    Tests     Lumbar   Positive SIJ compression.     Left Hip   Positive FADIR.   Negative HAWA.     Right Hip   Positive HAWA and FADIR.              Precautions: Hx of cancer, Hx of scoliosis with shira placement, hx of lumbar spine fusion sx.       Date 4/7 IE       FOTO 45 SC       Manuals                                        Neuro Re-Ed        Jovan 5\" 10x ea.                                                        Ther Ex        Body mechanics training 10 min       Standing hip abduction 15x ea.        Standing hip extension  15x ea.                                                Ther Activity                        Gait Training                        Modalities                                               "

## 2025-04-07 NOTE — PROGRESS NOTES
"PT Evaluation     Today's date: 2025  Patient name: Shira Monte  : 1980  MRN: [id number]  Referring provider: Tirso Ayon, LEVY  Dx:   Encounter Diagnosis     ICD-10-CM    1. Chronic bilateral low back pain, unspecified whether sciatica present  M54.50     G89.29           Start Time: 1500  Stop Time: 1544  Total time in clinic (min): 44 minutes    Assessment  Impairments: abnormal or restricted ROM, activity intolerance, impaired balance, impaired physical strength, lacks appropriate home exercise program, pain with function, poor body mechanics and activity limitations  Symptom irritability: moderate    Assessment details: Patient is a 43 y/o female with chief c/o LBP. Patient presents with chronic R sided low back pain which has progressively worsened over the past few years. She presents with noted tenderness over the R SIJ with positive provocation tests to the R side. She has moderate limitations to lumbar spine mobility which is contributed to her history of low back surgeries. She presents with moderate limitations to B/L LE and core/trunk strength per the evaluation today. We spent time reviewing proper lifting mechanics and body mechanics for home and work duties. She had greater limitations to the R LE during strengthening interventions today.     Goals  STGs:  \"Patient will be independent with hep by 2-3 visits.   Decrease low back pain by 25% for improved tolerance with adls and home duties by 3-4 weeks.   Patient will demonstrate adequate body mechanics during work duties and adls allowing for decreased stress to the lumbar spine by 3-4 weeks.     LTGs:  \"Improve FOTO score from 45 to 57 indicating improved tolerance with activities involving the low back by discharge.   Patient will demonstrate rom and strength to the lumbar spine wfl for improved tolerance with adls and home duties by discharge.   Patient will demonstrate B/L Les strength of 4/5 or better for improved tolerance with " adls and work duties by discharge.       Plan  Patient would benefit from: skilled physical therapy  Planned modality interventions: cryotherapy and thermotherapy: hydrocollator packs    Planned therapy interventions: abdominal trunk stabilization, ADL retraining, body mechanics training, flexibility, functional ROM exercises, home exercise program, therapeutic exercise, therapeutic activities, stretching, strengthening, postural training, patient education, joint mobilization and manual therapy    Frequency: 2x week  Duration in weeks: 4  Plan of Care beginning date: 4/7/2025  Plan of Care expiration date: 5/7/2025  Treatment plan discussed with: patient  Plan details: Patient informed that from this point forward, to ensure adherence to the aforementioned plan of care, all or some of the treatment may be performed and carried out by a Physical Therapy Assistant (PTA) with supervision from a licensed Physical Therapist (PT) in accordance with [facility] Physical Therapy Practice Act.  Patient will continue to benefit from skilled physical therapy to address the functional deficits that were identified during the evaluation today. We will continue to progress the therapy program to address these functional deficits and achieve the established goals.              Subjective Evaluation    History of Present Illness  Mechanism of injury: Patient presents to out patient physical therapy with chief c/o LBP. Patient reports a history of scoliosis which was managed with fusion surgery along with placement of rods. She notes that her back will become sore when she is sitting or standing for longer periods of times. She has difficulties with bending and twisting which causes her some increased discomfort in the low back. She reports some occasional numbness and tingling into both hands and also intermittently into the B/L Les when she has increased pain in her low back. She also reports occasional weaknesses into the  legs to where she feels as though she is dragging her legs. She is limited to approximately 45 minutes of sitting and 30 minutes of sitting prior to her back pain increasing.           Recurrent probem    Quality of life: good    Patient Goals  Patient goals for therapy: decreased pain and increased motion  Patient goal: Patient wishes to have better control of her pain in her low back and LEs.  Pain  Current pain ratin  At best pain rating: 3  At worst pain ratin  Location: Lumbar  Quality: discomfort, dull ache, throbbing, radiating and sharp  Relieving factors: change in position, heat, rest and medications  Aggravating factors: standing, walking and sitting (repetitive bending, twisting)    Social Support    Employment status: working        Objective     Active Range of Motion     Lumbar   Flexion:  Restriction level: moderate  Extension:  Restriction level: moderate  Left lateral flexion:  Restriction level: moderate  Right lateral flexion:  Restriction level: moderate  Left rotation:  Restriction level: moderate  Right rotation:  Restriction level: moderate    Strength/Myotome Testing     Left Hip   Planes of Motion   Flexion: 3+  Extension: 3+  Abduction: 3+  Adduction: 3+  External rotation: 3+  Internal rotation: 3+    Right Hip   Planes of Motion   Flexion: 3+  Extension: 3+  Abduction: 3+  Adduction: 3+  External rotation: 3+  Internal rotation: 3+    Left Knee   Flexion: 4-  Extension: 3+    Right Knee   Flexion: 4-  Extension: 3+    Left Ankle/Foot   Dorsiflexion: 4  Plantar flexion: 4  Inversion: 3+  Eversion: 3+    Right Ankle/Foot   Dorsiflexion: 4  Plantar flexion: 4  Inversion: 3+  Eversion: 3+    Tests     Lumbar   Positive SIJ compression.     Left Hip   Positive FADIR.   Negative HAWA.     Right Hip   Positive HAWA and FADIR.              Precautions: Hx of cancer, Hx of scoliosis with shira placement, hx of lumbar spine fusion sx.       Date  IE       FOTO 45 SC       Manuals       "                                  Neuro Re-Ed        [facility].                                                        Ther Ex        Body mechanics training 10 min       Standing hip abduction 15x ea.        Standing hip extension  15x ea.                                                Ther Activity                        Gait Training                        Modalities                               "

## 2025-04-09 ENCOUNTER — APPOINTMENT (OUTPATIENT)
Dept: PHYSICAL THERAPY | Facility: CLINIC | Age: 45
End: 2025-04-09
Payer: COMMERCIAL

## 2025-04-14 ENCOUNTER — APPOINTMENT (OUTPATIENT)
Dept: PHYSICAL THERAPY | Facility: CLINIC | Age: 45
End: 2025-04-14
Payer: COMMERCIAL

## 2025-04-17 NOTE — PROGRESS NOTES
Patient has canceled two of her three follow up appointments from the IE and NS to the third visit. Episode will be discharged at this time secondary to lack of compliance to the established POC.

## 2025-04-24 ENCOUNTER — TELEPHONE (OUTPATIENT)
Dept: HEMATOLOGY ONCOLOGY | Facility: CLINIC | Age: 45
End: 2025-04-24

## 2025-04-24 DIAGNOSIS — M19.90 INFLAMMATORY ARTHRITIS: ICD-10-CM

## 2025-04-24 NOTE — TELEPHONE ENCOUNTER
Left message on patient's phone indicating to have labs drawn prior to appointment.  Indicated that the scripts are in the system, the tests are non-fasting and that patient can go to any [facility] facility to have the labs drawn.  Provided callback number [phone].

## 2025-04-28 RX ORDER — METHOTREXATE 2.5 MG/1
TABLET ORAL
Qty: 32 TABLET | Refills: 0 | Status: SHIPPED | OUTPATIENT
Start: 2025-04-28

## 2025-05-22 ENCOUNTER — OFFICE VISIT (OUTPATIENT)
Dept: RHEUMATOLOGY | Facility: CLINIC | Age: 45
End: 2025-05-22

## 2025-05-22 VITALS
SYSTOLIC BLOOD PRESSURE: 130 MMHG | HEART RATE: 74 BPM | BODY MASS INDEX: 26.84 KG/M2 | HEIGHT: 66 IN | WEIGHT: 167 LBS | TEMPERATURE: 98 F | OXYGEN SATURATION: 99 % | DIASTOLIC BLOOD PRESSURE: 78 MMHG

## 2025-05-22 DIAGNOSIS — Z79.60 LONG-TERM USE OF IMMUNOSUPPRESSANT MEDICATION: ICD-10-CM

## 2025-05-22 DIAGNOSIS — M19.90 INFLAMMATORY ARTHRITIS: ICD-10-CM

## 2025-05-22 DIAGNOSIS — M35.9 UNDIFFERENTIATED CONNECTIVE TISSUE DISEASE (HCC): Primary | ICD-10-CM

## 2025-05-22 RX ORDER — HYDROXYCHLOROQUINE SULFATE 200 MG/1
TABLET, FILM COATED ORAL
Qty: 135 TABLET | Refills: 3 | Status: SHIPPED | OUTPATIENT
Start: 2025-05-22 | End: 2026-05-22

## 2025-05-22 RX ORDER — PANTOPRAZOLE SODIUM 40 MG/1
TABLET, DELAYED RELEASE ORAL
COMMUNITY
Start: 2025-04-09

## 2025-05-22 NOTE — PATIENT INSTRUCTIONS
Please call your eye doctor and let them know that you are starting hydroxychloroquine and need baseline screening for retinal toxicity within the next 6 months    Continue methotrexate    Get blood work (creatinine, calcium, LFTs, CBC) every 3 months while on methotrexate. You are overdue for this.    While on your prescribed rheumatologic medication(s) methotrexate: you must STOP the medication if you fall ill (ex: a viral infection, bacterial infection) and not restart it until your illness is resolved and/or you are finished with any antibiotics/antivirals/antifungals that are prescribed for you, whichever happens last.    While on the medication(s), if you are to get a vaccine, you may have to STOP the medication before and after your vaccination. See below for how long to stop your medication according to how often you take it.    LIVE ATTENUATED VACCINES (ex: MMR, rotavirus, smallpox, chickenpox, yellow fever)    Hold before vaccine Hold after vaccine   Steroids 4 weeks 4 weeks   Methotrexate 4 weeks      COVID-19 VACCINE    Instructions   Abatacept (IV) Time vaccine so it is given 2 weeks before next scheduled abatacept dose, then receive abatacept as scheduled   Abatacept (SQ) Delay abatacept dose for 2 weeks after vaccine   Belimumab Delay belimumab dose for 2 weeks after vaccine   Cyclophosphamide (IV) Time cyclophosphamide administration so that it will occur 1 week after each vaccine dose   Hydroxychloroquine No changes to hydroxychloroquine timing or vaccine timing   IVIG No changes to IVIG timing or vaccine timing   Rituximab Time vaccine so that it is given 2 weeks before the next scheduled rituximab dose, then receive rituximab as scheduled   All others Delay rheumatologic medication dose for 2 weeks after vaccine, if disease activity allows     INFLUENZA VACCINE    Hold before vaccine Hold after vaccine   Methotrexate 1 week 2 weeks if able   Rituximab  n/a 2 weeks   All others CONTINUE, DO NOT  HOLD CONTINUE, DO NOT HOLD     ALL OTHER VACCINES    Hold before vaccine Hold after vaccine   Methotrexate CONTINUE, DO NOT HOLD CONTINUE, DO NOT HOLD   Rituximab  n/a Time vaccination for when next dose is due, then give rituximab at least 2 weeks after vaccine   All others CONTINUE, DO NOT HOLD CONTINUE, DO NOT HOLD     These instructions are consistent with the 2022 recommendations set forth by the [employer] College of Rheumat[employer] (ACR).

## 2025-05-22 NOTE — ASSESSMENT & PLAN NOTE
Redness and puffiness of fingers, chest rash not just in sun-exposed area, lateral thigh rash do sound like they may be connective tissue disease-related; possibly ADM? Discussed hydroxychloroquine risks:benefits, she will call her Ophtho who she just saw and let them know she needs baseline retinal testing. Also advised her to take photos of her rashes and when her fingers are very puffy and red  Orders:    hydroxychloroquine (PLAQUENIL) 200 mg tablet; Take one pill on day one, two pills on day two, and continue alternating back and forth like this

## 2025-05-22 NOTE — PROGRESS NOTES
Name: Shira Monte      : 1980      MRN: [id number]  Encounter Provider: Amaury Quintanilla DO  Encounter Date: 2025   Encounter department: [facility]  :  Assessment & Plan  Undifferentiated connective tissue disease (HCC)  Redness and puffiness of fingers, chest rash not just in sun-exposed area, lateral thigh rash do sound like they may be connective tissue disease-related; possibly ADM? Discussed hydroxychloroquine risks:benefits, she will call her Ophtho who she just saw and let them know she needs baseline retinal testing. Also advised her to take photos of her rashes and when her fingers are very puffy and red  Orders:    hydroxychloroquine (PLAQUENIL) 200 mg tablet; Take one pill on day one, two pills on day two, and continue alternating back and forth like this    Inflammatory arthritis  Under good control on current methotrexate dose       Long-term use of immunosuppressant medication  Very overdue for labs, she will get them tomorrow  Orders:    Creatinine, serum; Standing    Calcium; Standing    Hepatic function panel; Standing    CBC and differential; Standing      Patient's rheumatologic disease(s) threaten long-term function if not appropriately managed.    Patient's rheumatologic medication(s) require intensive monitoring for toxicity.    History of Present Illness     Still feels like methotrexate is helping, but still getting redness and swelling of the hands. Joints however are much better. Continues with chest rash (not just in sun-exposed areas), sometimes does get lateral thigh rash. No back rash that she is aware of    Hair loss improved with higher FA dose    Redness of the hands is from the MCPs and distal. All the fingers will swell    Permanent History: First saw me 2024 for joint stiffness for many years and worsening, intermittent finger puffiness worse in the mornings, SCOTT 1:320, intermittent rash on face/hands/upper chest. Given  "symptoms was concerning for possible early inflammatory arthritis and/or early SSc vs amyopathic DM. Started methotrexate with significant improvement in her inflammatory arthritis     Other significant PMH melanoma     Objective   /78 (BP Location: Right arm, Patient Position: Sitting, Cuff Size: Adult)   Pulse 74   Temp 98 °F (36.7 °C) (Temporal)   Ht 5' 6\" (1.676 m)   Wt 75.8 kg (167 lb)   LMP 10/08/2023 Comment: aub ongoing since june  SpO2 99%   BMI 26.95 kg/m²      General: Well appearing, in no distress.   Eyes: Sclera non-icteric. EOMI  Extremities: Warm, well perfused, no edema.   Neuro: Alert and oriented. No gross focal neurological deficits.   Skin: No discrete rashes; some erythema of upper chest  MSK exam: no tenderness to palpation or synovitis or significant synovial hypertrophy any peripheral joint or spine  "

## 2025-05-22 NOTE — ASSESSMENT & PLAN NOTE
Very overdue for labs, she will get them tomorrow  Orders:    Creatinine, serum; Standing    Calcium; Standing    Hepatic function panel; Standing    CBC and differential; Standing

## 2025-05-24 ENCOUNTER — APPOINTMENT (OUTPATIENT)
Dept: LAB | Facility: CLINIC | Age: 45
End: 2025-05-24
Payer: COMMERCIAL

## 2025-05-24 DIAGNOSIS — Z79.60 LONG-TERM USE OF IMMUNOSUPPRESSANT MEDICATION: ICD-10-CM

## 2025-05-24 PROCEDURE — 80076 HEPATIC FUNCTION PANEL: CPT

## 2025-05-24 PROCEDURE — 82565 ASSAY OF CREATININE: CPT

## 2025-05-24 PROCEDURE — 85025 COMPLETE CBC W/AUTO DIFF WBC: CPT

## 2025-05-24 PROCEDURE — 36415 COLL VENOUS BLD VENIPUNCTURE: CPT

## 2025-05-25 LAB
ALBUMIN SERPL BCG-MCNC: 4.1 G/DL (ref 3.5–5)
ALP SERPL-CCNC: 47 U/L (ref 34–104)
ALT SERPL W P-5'-P-CCNC: 16 U/L (ref 7–52)
AST SERPL W P-5'-P-CCNC: 13 U/L (ref 13–39)
BASOPHILS # BLD AUTO: 0.02 THOUSANDS/ÂΜL (ref 0–0.1)
BASOPHILS NFR BLD AUTO: 0 % (ref 0–1)
BILIRUB DIRECT SERPL-MCNC: 0.11 MG/DL (ref 0–0.2)
BILIRUB SERPL-MCNC: 0.46 MG/DL (ref 0.2–1)
CALCIUM SERPL-MCNC: 9 MG/DL (ref 8.4–10.2)
CREAT SERPL-MCNC: 0.5 MG/DL (ref 0.6–1.3)
EOSINOPHIL # BLD AUTO: 0.09 THOUSAND/ÂΜL (ref 0–0.61)
EOSINOPHIL NFR BLD AUTO: 2 % (ref 0–6)
ERYTHROCYTE [DISTWIDTH] IN BLOOD BY AUTOMATED COUNT: 13.9 % (ref 11.6–15.1)
GFR SERPL CREATININE-BSD FRML MDRD: 119 ML/MIN/1.73SQ M
HCT VFR BLD AUTO: 40.6 % (ref 34.8–46.1)
HGB BLD-MCNC: 13.2 G/DL (ref 11.5–15.4)
IMM GRANULOCYTES # BLD AUTO: 0.01 THOUSAND/UL (ref 0–0.2)
IMM GRANULOCYTES NFR BLD AUTO: 0 % (ref 0–2)
LYMPHOCYTES # BLD AUTO: 1.28 THOUSANDS/ÂΜL (ref 0.6–4.47)
LYMPHOCYTES NFR BLD AUTO: 23 % (ref 14–44)
MCH RBC QN AUTO: 34 PG (ref 26.8–34.3)
MCHC RBC AUTO-ENTMCNC: 32.5 G/DL (ref 31.4–37.4)
MCV RBC AUTO: 105 FL (ref 82–98)
MONOCYTES # BLD AUTO: 0.42 THOUSAND/ÂΜL (ref 0.17–1.22)
MONOCYTES NFR BLD AUTO: 7 % (ref 4–12)
NEUTROPHILS # BLD AUTO: 3.87 THOUSANDS/ÂΜL (ref 1.85–7.62)
NEUTS SEG NFR BLD AUTO: 68 % (ref 43–75)
NRBC BLD AUTO-RTO: 0 /100 WBCS
PLATELET # BLD AUTO: 219 THOUSANDS/UL (ref 149–390)
PMV BLD AUTO: 10.6 FL (ref 8.9–12.7)
PROT SERPL-MCNC: 6.6 G/DL (ref 6.4–8.4)
RBC # BLD AUTO: 3.88 MILLION/UL (ref 3.81–5.12)
WBC # BLD AUTO: 5.69 THOUSAND/UL (ref 4.31–10.16)

## 2025-05-29 ENCOUNTER — OFFICE VISIT (OUTPATIENT)
Dept: URGENT CARE | Facility: CLINIC | Age: 45
End: 2025-05-29
Payer: COMMERCIAL

## 2025-05-29 VITALS
TEMPERATURE: 98.2 F | HEIGHT: 66 IN | BODY MASS INDEX: 26.52 KG/M2 | RESPIRATION RATE: 18 BRPM | SYSTOLIC BLOOD PRESSURE: 126 MMHG | OXYGEN SATURATION: 100 % | HEART RATE: 77 BPM | WEIGHT: 165 LBS | DIASTOLIC BLOOD PRESSURE: 71 MMHG

## 2025-05-29 DIAGNOSIS — J22 INFECTION OF LOWER RESPIRATORY TRACT: Primary | ICD-10-CM

## 2025-05-29 LAB — S PYO AG THROAT QL: NEGATIVE

## 2025-05-29 PROCEDURE — 99213 OFFICE O/P EST LOW 20 MIN: CPT

## 2025-05-29 PROCEDURE — 87880 STREP A ASSAY W/OPTIC: CPT

## 2025-05-29 RX ORDER — FLUOXETINE 20 MG/1
TABLET, FILM COATED ORAL
COMMUNITY
Start: 2025-05-03

## 2025-05-29 RX ORDER — DOXYCYCLINE HYCLATE 100 MG
100 TABLET ORAL 2 TIMES DAILY
Qty: 10 TABLET | Refills: 0 | Status: SHIPPED | OUTPATIENT
Start: 2025-05-29 | End: 2025-06-03

## 2025-05-29 RX ORDER — PREDNISONE 20 MG/1
40 TABLET ORAL DAILY
Qty: 10 TABLET | Refills: 0 | Status: SHIPPED | OUTPATIENT
Start: 2025-05-29 | End: 2025-06-03

## 2025-05-29 NOTE — PROGRESS NOTES
[facility]        NAME: Shira Monte is a 44 y.o. female  : 1980    MRN: [id number]  DATE: May 29, 2025  TIME: 11:28 AM    Assessment and Plan   Infection of lower respiratory tract [J22]  1. Infection of lower respiratory tract  POCT rapid ANTIGEN strepA    doxycycline hyclate (VIBRA-TABS) 100 mg tablet    predniSONE 20 mg tablet        Pt is immunocompromised and has asthma.   Breath sounds with rhonchi in left lower lung field. Frequent cough. + appetite and activity disturbance.   Will place on prednisone and doxycycline and advised on both. Pt will contact rheum to inquire if she should take the methotrexate and plaquenil while taking the ordered medications from today.   If worsening symptoms follow up with er recommended.     Patient Instructions       Follow up with PCP in 3-5 days.  Proceed to  ER if symptoms worsen.    If tests are performed, our office will contact you with results only if changes need to made to the care plan discussed with you at the visit. You can review your full results on [facility].    Chief Complaint     Chief Complaint   Patient presents with    Fever     Needs a note for work    Dizziness    Sore Throat    Generalized Body Aches     Started on          History of Present Illness       44-year-old female with significant past medical history presents to this clinic with complaint of fever, dizziness, sore throat, and generalized bodyaches.  Symptoms started on , 2025.  Patient has missed work and is requesting a work note for her missed time.    Fever  Associated symptoms include a fever, myalgias and a sore throat.   Dizziness  Associated symptoms include a fever, myalgias and a sore throat.   Sore Throat     Generalized Body Aches  Associated symptoms include a sore throat and a fever.       Review of Systems   Review of Systems   Constitutional:  Positive for fever.   HENT:  Positive for sore throat.    Musculoskeletal:  Positive  "for myalgias.   Neurological:  Positive for dizziness.         Current Medications     Current Medications[1]    Current Allergies     Allergies as of 05/29/2025 - Reviewed 05/29/2025   Allergen Reaction Noted    Azithromycin GI Intolerance and Nausea Only 09/12/2022            The following portions of the patient's history were reviewed and updated as appropriate: allergies, current medications, past family history, past medical history, past social history, past surgical history and problem list.     Past Medical History[2]    Past Surgical History[3]    Family History[4]      Medications have been verified.        Objective   /71   Pulse 77   Temp 98.2 °F (36.8 °C) Comment: had Motrin earlier  Resp 18   Ht 5' 6\" (1.676 m)   Wt 74.8 kg (165 lb)   LMP 10/08/2023 Comment: aub ongoing since june  SpO2 100%   BMI 26.63 kg/m²        Physical Exam     Physical Exam  Vitals and nursing note reviewed.   Constitutional:       General: She is not in acute distress.     Appearance: Normal appearance. She is normal weight. She is ill-appearing.   HENT:      Head: Normocephalic and atraumatic.      Right Ear: Tympanic membrane, ear canal and external ear normal.      Left Ear: Tympanic membrane, ear canal and external ear normal.      Nose: Nose normal.      Mouth/Throat:      Mouth: Mucous membranes are moist.      Pharynx: Posterior oropharyngeal erythema present.     Eyes:      Extraocular Movements: Extraocular movements intact.      Conjunctiva/sclera: Conjunctivae normal.      Pupils: Pupils are equal, round, and reactive to light.      Comments: glasses     Cardiovascular:      Rate and Rhythm: Normal rate and regular rhythm.      Pulses: Normal pulses.      Heart sounds: Normal heart sounds.   Pulmonary:      Effort: Pulmonary effort is normal.      Breath sounds: Examination of the left-lower field reveals rhonchi. Rhonchi present.      Comments: Frequent loose cough    Musculoskeletal:         General: " Normal range of motion.      Cervical back: Normal range of motion and neck supple.     Skin:     General: Skin is warm and dry.      Capillary Refill: Capillary refill takes less than 2 seconds.     Neurological:      General: No focal deficit present.      Mental Status: She is alert and oriented to person, place, and time.                        [1]   Current Outpatient Medications:     albuterol (ProAir HFA) 90 mcg/act inhaler, Inhale 2 puffs every 6 (six) hours as needed for shortness of breath, Disp: 8.5 g, Rfl: 0    budesonide-formoterol (SYMBICORT) 80-4.5 MCG/ACT inhaler, Inhale 2 puffs in the morning and 2 puffs in the evening. Rinse mouth after use., Disp: , Rfl:     cholecalciferol (VITAMIN D3) 1,000 units tablet, Take 1,000 Units by mouth in the morning., Disp: , Rfl:     doxycycline hyclate (VIBRA-TABS) 100 mg tablet, Take 1 tablet (100 mg total) by mouth 2 (two) times a day for 5 days, Disp: 10 tablet, Rfl: 0    FLUoxetine 20 MG tablet, , Disp: , Rfl:     folic acid (KP Folic Acid) 1 mg tablet, Take 3 tablets (3 mg total) by mouth daily, Disp: 270 tablet, Rfl: 2    hydroxychloroquine (PLAQUENIL) 200 mg tablet, Take one pill on day one, two pills on day two, and continue alternating back and forth like this, Disp: 135 tablet, Rfl: 3    methotrexate 2.5 MG tablet, TAKE EIGHT (8) TABLETS BY MOUTH ON THE SAME DAY EVERY WEEK; (FOUR) 4 TABLETS  IN THE MORNING, (FOUR) 4 TABLETS  IN THE EVENING., Disp: 32 tablet, Rfl: 0    omeprazole (PriLOSEC) 20 mg delayed release capsule, Take 20 mg by mouth if needed, Disp: , Rfl:     ondansetron (ZOFRAN) 4 mg tablet, Take 4 mg by mouth every 8 (eight) hours as needed for nausea or vomiting, Disp: , Rfl:     predniSONE 20 mg tablet, Take 2 tablets (40 mg total) by mouth daily for 5 days, Disp: 10 tablet, Rfl: 0    albuterol (PROVENTIL HFA,VENTOLIN HFA) 90 mcg/act inhaler, Inhale 2 puffs every 6 (six) hours as needed for wheezing (Patient not taking: Reported on 5/29/2025),  Disp: , Rfl:     FLUoxetine (PROzac) 10 MG tablet, Take 10 mg by mouth in the morning. (Patient not taking: Reported on 5/29/2025), Disp: , Rfl:     pantoprazole (PROTONIX) 40 mg tablet, , Disp: , Rfl:   [2]   Past Medical History:  Diagnosis Date    Abnormal Pap smear of cervix     Abnormal uterine bleeding (AUB)     Anemia     Arthritis     Asthma     Cancer (HCC)     melanoma    Chronic pain disorder     back    COVID-19 08/2021    mild s/s-fully vaccinated    GERD (gastroesophageal reflux disease)     History of transfusion     Inflammatory bowel disease     Melanoma (HCC) 8/24/22    Osteoarthritis     Pneumonia     as a child    Shortness of breath     allergies trigger/on exersion    Skin cancer 08/24/22    Varicella     Wears contact lenses     Wears glasses    [3]   Past Surgical History:  Procedure Laterality Date    APPENDECTOMY  2011    BACK SURGERY  2005    BREAST BIOPSY      CHOLECYSTECTOMY  2011    COLONOSCOPY      EGD      ENDOMETRIAL ABLATION N/A 2021    HERNIA REPAIR  2011    umbilical hernia    INTESTINAL MALROTATION REPAIR N/A 11/2015    MYOMECTOMY      [place] CYSTOURETHROSCOPY N/A 10/19/2023    Procedure: CYSTOSCOPY;  Surgeon: Montse Smart MD;  Location: [facility];  Service: Gynecology    NY LAPS TOTAL HYSTERECT 250 GM/< W/RMVL TUBE/OVARY N/A 10/19/2023    Procedure: HYSTERECTOMY LAPAROSCOPIC TOTAL (LTH) WITH BILATERAL SALPINGECTOMY ;  Surgeon: Montse Smart MD;  Location: [facility];  Service: Gynecology    SKIN BIOPSY  8/11/22    SKIN LESION EXCISION N/A 10/07/2022    Procedure: WIDE EXCISION OF UPPER BACK MELANOMA;  Surgeon: Joy Gallagher MD;  Location: [facility];  Service: Surgical Oncology    SPINAL FUSION N/A 11/2005    with [place] shira-    STOMACH SURGERY  2015    UPPER GASTROINTESTINAL ENDOSCOPY      Not sure of dates   [4]   Family History  Problem Relation Name Age of Onset    Hypertension Mother Lisa     Diverticulosis Mother Lisa     Irritable bowel syndrome  Mother Lisa     Heart attack Mother Lisa     Osteoarthritis Mother Lisa     Osteoporosis Mother Lisa     Arthritis Mother Lisa     Diabetes Father Scottie     Hypertension Father Scottie     Asthma Father Scottie     Heart attack Father Scottie     Heart disease Father Scottie     Heart failure Father Scottie     Colon cancer Maternal Grandmother Grandmother     Cancer Maternal Grandmother Grandmother     Cancer Maternal Grandfather Lee         Lung cancer    Diabetes Paternal Grandfather Ravinder     Cancer Maternal Aunt Criss         Skin - melanoma    Cancer Maternal Uncle Guanaco         Skin - melanoma    Cancer Maternal Uncle Joshua         Skin - melanoma    Colon cancer Other Maternal great grandma     Breast cancer Paternal Aunt  40 - 49    Breast cancer Paternal Aunt Romana     Bone cancer Maternal Uncle Juan José     Cancer Maternal Aunt Criss         Skin - melanoma    Cancer Maternal Uncle Guanaco         Skin - melanoma    Cancer Maternal Uncle Joshua         Skin - melanoma    Breast cancer Paternal Aunt Romana     Bone cancer Maternal Uncle Juan José     Cancer Maternal Uncle Guanaco         Skin - melanoma    Cancer Maternal Uncle Joshua         Skin - melanoma    Cancer Maternal Aunt Criss         Skin - melanoma

## 2025-05-29 NOTE — LETTER
May 29, 2025     Patient: Shira Monte  YOB: 1980  Date of Visit: 5/29/2025      To Whom it May Concern:    Shira Monte is under my professional care. Shira was seen in my office on 5/29/2025. Shira may return to work on 5/31/2025.    If you have any questions or concerns, please don't hesitate to call.         Sincerely,          ALEXANDRA Tovar        CC: No Recipients

## 2025-05-29 NOTE — PATIENT INSTRUCTIONS
You may take over the counter Tylenol (Acetaminophen) and/or Motrin (Ibuprofen) as needed, as directed on packaging.     Take the medications prescribed to you today for the full course. Even if you are feeling better, DO NOT stop taking the medication until completion.    If not better in 3-5 days it is advised you have yourself rechecked by your family doctor.     If you are getting worse, it is advised you go to the emergency room to be rechecked.

## 2025-07-07 ENCOUNTER — TELEPHONE (OUTPATIENT)
Dept: RHEUMATOLOGY | Facility: CLINIC | Age: 45
End: 2025-07-07

## 2025-07-28 ENCOUNTER — OFFICE VISIT (OUTPATIENT)
Dept: HEMATOLOGY ONCOLOGY | Facility: CLINIC | Age: 45
End: 2025-07-28
Payer: COMMERCIAL

## 2025-07-28 VITALS
SYSTOLIC BLOOD PRESSURE: 114 MMHG | RESPIRATION RATE: 18 BRPM | WEIGHT: 162 LBS | DIASTOLIC BLOOD PRESSURE: 68 MMHG | HEART RATE: 79 BPM | BODY MASS INDEX: 26.03 KG/M2 | OXYGEN SATURATION: 99 % | HEIGHT: 66 IN | TEMPERATURE: 98.1 F

## 2025-07-28 DIAGNOSIS — Z08 ENCOUNTER FOR FOLLOW-UP SURVEILLANCE OF MELANOMA: Primary | ICD-10-CM

## 2025-07-28 DIAGNOSIS — C43.59 MELANOMA OF BACK (HCC): ICD-10-CM

## 2025-07-28 DIAGNOSIS — D75.89 MACROCYTOSIS: ICD-10-CM

## 2025-07-28 DIAGNOSIS — Z85.820 ENCOUNTER FOR FOLLOW-UP SURVEILLANCE OF MELANOMA: Primary | ICD-10-CM

## 2025-07-28 PROCEDURE — 99213 OFFICE O/P EST LOW 20 MIN: CPT | Performed by: INTERNAL MEDICINE

## 2025-07-30 ENCOUNTER — HOSPITAL ENCOUNTER (OUTPATIENT)
Dept: MAMMOGRAPHY | Facility: HOSPITAL | Age: 45
Discharge: HOME | End: 2025-07-30
Payer: COMMERCIAL

## 2025-07-30 ENCOUNTER — TELEPHONE (OUTPATIENT)
Age: 45
End: 2025-07-30

## 2025-07-30 VITALS — BODY MASS INDEX: 26.03 KG/M2 | HEIGHT: 66 IN | WEIGHT: 162 LBS

## 2025-07-30 DIAGNOSIS — Z12.31 ENCOUNTER FOR SCREENING MAMMOGRAM FOR MALIGNANT NEOPLASM OF BREAST: ICD-10-CM

## 2025-07-30 PROCEDURE — 77067 SCR MAMMO BI INCL CAD: CPT

## 2025-07-30 PROCEDURE — 77063 BREAST TOMOSYNTHESIS BI: CPT

## 2025-08-01 ENCOUNTER — HOSPITAL ENCOUNTER (OUTPATIENT)
Dept: ULTRASOUND IMAGING | Facility: HOSPITAL | Age: 45
Discharge: HOME | End: 2025-08-01
Attending: STUDENT IN AN ORGANIZED HEALTH CARE EDUCATION/TRAINING PROGRAM
Payer: COMMERCIAL

## 2025-08-01 DIAGNOSIS — G62.9 NEUROPATHY: ICD-10-CM

## 2025-08-01 PROCEDURE — 76882 US LMTD JT/FCL EVL NVASC XTR: CPT

## 2025-08-06 ENCOUNTER — OFFICE VISIT (OUTPATIENT)
Dept: OBGYN CLINIC | Facility: CLINIC | Age: 45
End: 2025-08-06
Payer: COMMERCIAL

## 2025-08-06 VITALS
HEIGHT: 66 IN | BODY MASS INDEX: 25.71 KG/M2 | OXYGEN SATURATION: 99 % | WEIGHT: 160 LBS | HEART RATE: 72 BPM | RESPIRATION RATE: 16 BRPM | TEMPERATURE: 98.2 F

## 2025-08-06 DIAGNOSIS — G56.23 CUBITAL TUNNEL SYNDROME OF BOTH UPPER EXTREMITIES: Primary | ICD-10-CM

## 2025-08-06 PROCEDURE — 99204 OFFICE O/P NEW MOD 45 MIN: CPT | Performed by: STUDENT IN AN ORGANIZED HEALTH CARE EDUCATION/TRAINING PROGRAM

## 2025-08-07 ENCOUNTER — CONSULT (OUTPATIENT)
Dept: DERMATOLOGY | Facility: CLINIC | Age: 45
End: 2025-08-07
Attending: INTERNAL MEDICINE

## 2025-08-07 VITALS — TEMPERATURE: 97.5 F | BODY MASS INDEX: 27.7 KG/M2 | WEIGHT: 171.6 LBS

## 2025-08-07 DIAGNOSIS — D22.5 MULTIPLE BENIGN MELANOCYTIC NEVI OF UPPER AND LOWER EXTREMITIES AND TRUNK: ICD-10-CM

## 2025-08-07 DIAGNOSIS — D22.61 MULTIPLE BENIGN MELANOCYTIC NEVI OF UPPER AND LOWER EXTREMITIES AND TRUNK: ICD-10-CM

## 2025-08-07 DIAGNOSIS — C43.59 MELANOMA OF BACK (HCC): ICD-10-CM

## 2025-08-07 DIAGNOSIS — D22.72 MULTIPLE BENIGN MELANOCYTIC NEVI OF UPPER AND LOWER EXTREMITIES AND TRUNK: ICD-10-CM

## 2025-08-07 DIAGNOSIS — D18.01 CHERRY ANGIOMA: ICD-10-CM

## 2025-08-07 DIAGNOSIS — L81.4 LENTIGO: ICD-10-CM

## 2025-08-07 DIAGNOSIS — D22.71 MULTIPLE BENIGN MELANOCYTIC NEVI OF UPPER AND LOWER EXTREMITIES AND TRUNK: ICD-10-CM

## 2025-08-07 DIAGNOSIS — L82.1 SEBORRHEIC KERATOSES: Primary | ICD-10-CM

## 2025-08-07 DIAGNOSIS — D22.62 MULTIPLE BENIGN MELANOCYTIC NEVI OF UPPER AND LOWER EXTREMITIES AND TRUNK: ICD-10-CM

## 2025-08-11 ENCOUNTER — OFFICE VISIT (OUTPATIENT)
Dept: OBGYN CLINIC | Facility: CLINIC | Age: 45
End: 2025-08-11
Payer: COMMERCIAL

## 2025-08-11 PROBLEM — G56.03 CARPAL TUNNEL SYNDROME, BILATERAL: Status: ACTIVE | Noted: 2025-08-11

## (undated) DEVICE — PLUMEPEN PRO 10FT

## (undated) DEVICE — SUT MONOCRYL 4-0 PS-2 27 IN Y426H

## (undated) DEVICE — SUT SILK 2-0 SH 30 IN K833H

## (undated) DEVICE — PREMIUM DRY TRAY LF: Brand: MEDLINE INDUSTRIES, INC.

## (undated) DEVICE — INTENDED FOR TISSUE SEPARATION, AND OTHER PROCEDURES THAT REQUIRE A SHARP SURGICAL BLADE TO PUNCTURE OR CUT.: Brand: BARD-PARKER SAFETY BLADES SIZE 15, STERILE

## (undated) DEVICE — TRAY FOLEY 16FR URIMETER SURESTEP

## (undated) DEVICE — SINGLE PORT MANIFOLD: Brand: NEPTUNE 2

## (undated) DEVICE — TROCAR: Brand: KII® SLEEVE

## (undated) DEVICE — ANTI-FOG SOLUTION WITH FOAM PAD: Brand: DEVON

## (undated) DEVICE — ADHESIVE SKIN HIGH VISCOSITY EXOFIN 1ML

## (undated) DEVICE — CHLORHEXIDINE 4PCT 4 OZ

## (undated) DEVICE — PENCIL ELECTROSURG E-Z CLEAN -0035H

## (undated) DEVICE — SUT STRATAFIX SPIRAL 2-0 CT-1 30 CM SXPP1B410

## (undated) DEVICE — NEEDLE 22 G X 1 1/2 SAFETY

## (undated) DEVICE — SUT VICRYL 3-0 18 IN J904T

## (undated) DEVICE — 40583 XL ADVANCED TRENDELENBURG POSITIONING KIT: Brand: 40583 XL ADVANCED TRENDELENBURG POSITIONING KIT

## (undated) DEVICE — ANTIBACTERIAL UNDYED BRAIDED (POLYGLACTIN 910), SYNTHETIC ABSORBABLE SUTURE: Brand: COATED VICRYL

## (undated) DEVICE — BETHLEHEM UNIVERSAL MINOR GEN: Brand: CARDINAL HEALTH

## (undated) DEVICE — OCCLUDER COLPO-PNEUMO

## (undated) DEVICE — ENSEAL X1 TISSUE SEALER, CURVED JAW, 37 CM SHAFT LENGTH: Brand: ENSEAL

## (undated) DEVICE — 3000CC GUARDIAN II: Brand: GUARDIAN

## (undated) DEVICE — UTERINE MANIPULATOR RUMI 6.7 X 8 CM

## (undated) DEVICE — 1840 FOAM BLOCK NEEDLE COUNTER: Brand: DEVON

## (undated) DEVICE — GLOVE INDICATOR PI UNDERGLOVE SZ 7 BLUE

## (undated) DEVICE — SYRINGE 50ML LL

## (undated) DEVICE — TUBING SUCTION 5MM X 12 FT

## (undated) DEVICE — PACK PBDS LAP CHOLE RF

## (undated) DEVICE — 2, DISPOSABLE SUCTION/IRRIGATOR WITHOUT DISPOSABLE TIP: Brand: STRYKEFLOW

## (undated) DEVICE — UNDER BUTTOCKS DRAPE W/ESTIMATED FLUID LOSS POUCH: Brand: CONVERTORS

## (undated) DEVICE — CHLORAPREP HI-LITE 26ML ORANGE

## (undated) DEVICE — TROCAR: Brand: KII FIOS FIRST ENTRY

## (undated) DEVICE — MEDI-VAC YANK SUCT HNDL W/TPRD BULBOUS TIP: Brand: CARDINAL HEALTH

## (undated) DEVICE — TROCAR: Brand: KII SLEEVE

## (undated) DEVICE — VIOLET BRAIDED (POLYGLACTIN 910), SYNTHETIC ABSORBABLE SUTURE: Brand: COATED VICRYL

## (undated) DEVICE — NEEDLE 25G X 1 1/2

## (undated) DEVICE — SUT VICRYL 0 UR-6 27 IN J603H

## (undated) DEVICE — SUT STRATAFIX SMTR PDS PLUS 2-0 SH 18IN SXPP1A409

## (undated) DEVICE — ELECTRODE BLADE MOD E-Z CLEAN 2.5IN 6.4CM -0012M

## (undated) DEVICE — PVC URETHRAL CATHETER: Brand: DOVER

## (undated) DEVICE — TUBING SMOKE EVAC W/FILTRATION DEVICE PLUMEPORT ACTIV

## (undated) DEVICE — GLOVE SRG BIOGEL 6.5

## (undated) DEVICE — BETHLEHEM UNIVERSAL MINOR VAG: Brand: CARDINAL HEALTH

## (undated) DEVICE — 5 MM CURVED DISSECTORS WITH MONOPOLAR CAUTERY: Brand: ENDOPATH

## (undated) DEVICE — INTENDED FOR TISSUE SEPARATION, AND OTHER PROCEDURES THAT REQUIRE A SHARP SURGICAL BLADE TO PUNCTURE OR CUT.: Brand: BARD-PARKER ® CARBON RIB-BACK BLADES

## (undated) DEVICE — 3M™ TEGADERM™ TRANSPARENT FILM DRESSING FRAME STYLE, 1624W, 2-3/8 IN X 2-3/4 IN (6 CM X 7 CM), 100/CT 4CT/CASE: Brand: 3M™ TEGADERM™

## (undated) DEVICE — SKIN MARKER DUAL TIP WITH RULER CAP, FLEXIBLE RULER AND LABELS: Brand: DEVON

## (undated) DEVICE — THIS PRODUCT IS SINGLE USE AND INTENDED TO BE USED FOR BLUNT DISSECTION OF TISSUE.: Brand: ASPEN® ENDOSCOPIC KITTNER, SINGLE TIP

## (undated) DEVICE — GLOVE PI ULTRA TOUCH SZ.7.0

## (undated) DEVICE — SCD SEQUENTIAL COMPRESSION COMFORT SLEEVE MEDIUM KNEE LENGTH: Brand: KENDALL SCD

## (undated) DEVICE — PAD GROUNDING ADULT

## (undated) DEVICE — LEGGINGS: Brand: CONVERTORS

## (undated) DEVICE — 3M™ STERI-STRIP™ REINFORCED ADHESIVE SKIN CLOSURES, R1547, 1/2 IN X 4 IN (12 MM X 100 MM), 6 STRIPS/ENVELOPE: Brand: 3M™ STERI-STRIP™

## (undated) DEVICE — NEEDLE 18 G X 1 1/2 SAFETY